# Patient Record
Sex: FEMALE | Race: WHITE | NOT HISPANIC OR LATINO | Employment: OTHER | URBAN - METROPOLITAN AREA
[De-identification: names, ages, dates, MRNs, and addresses within clinical notes are randomized per-mention and may not be internally consistent; named-entity substitution may affect disease eponyms.]

---

## 2017-02-17 RX ORDER — RIVAROXABAN 20 MG/1
TABLET, FILM COATED ORAL
Qty: 30 TABLET | Refills: 5 | Status: SHIPPED | OUTPATIENT
Start: 2017-02-17 | End: 2017-08-28 | Stop reason: SDUPTHER

## 2017-07-12 ENCOUNTER — TELEPHONE (OUTPATIENT)
Dept: CARDIOLOGY | Facility: CLINIC | Age: 57
End: 2017-07-12

## 2017-07-12 NOTE — TELEPHONE ENCOUNTER
"Pt has scheduled f/u appt end of July with Dr. Davidson. She calls to report for the last 2-3 weeks her SBP has been elevated 150-160. HR 80's. Denies symptoms of afib, chest pain, shortness of air or edema. She is currently taking amlodipine 10 mg daily, sotalol 80 mg BID and Xarelto. She reports increase in stress. She states she has been on lisinopril in the past, (unknown dose) and thinks she just \"ran out\". She uses [facility] pharmacy. Please advise. Sara MATTA  "

## 2017-07-28 RX ORDER — SOTALOL HYDROCHLORIDE 80 MG/1
TABLET ORAL
Qty: 60 TABLET | Refills: 3 | Status: SHIPPED | OUTPATIENT
Start: 2017-07-28 | End: 2017-11-28 | Stop reason: SDUPTHER

## 2017-07-31 ENCOUNTER — OFFICE VISIT (OUTPATIENT)
Dept: CARDIOLOGY | Facility: CLINIC | Age: 57
End: 2017-07-31

## 2017-07-31 VITALS
BODY MASS INDEX: 21.67 KG/M2 | WEIGHT: 151.4 LBS | DIASTOLIC BLOOD PRESSURE: 68 MMHG | HEART RATE: 76 BPM | HEIGHT: 70 IN | SYSTOLIC BLOOD PRESSURE: 124 MMHG

## 2017-07-31 DIAGNOSIS — I48.0 PAROXYSMAL ATRIAL FIBRILLATION (HCC): Primary | ICD-10-CM

## 2017-07-31 DIAGNOSIS — I10 ESSENTIAL HYPERTENSION: ICD-10-CM

## 2017-07-31 DIAGNOSIS — I34.0 NON-RHEUMATIC MITRAL REGURGITATION: ICD-10-CM

## 2017-07-31 PROCEDURE — 93000 ELECTROCARDIOGRAM COMPLETE: CPT | Performed by: INTERNAL MEDICINE

## 2017-07-31 PROCEDURE — 99214 OFFICE O/P EST MOD 30 MIN: CPT | Performed by: INTERNAL MEDICINE

## 2017-07-31 NOTE — PROGRESS NOTES
[facility] AT [address], [address]  [place], [address] [phone]  [email or web address]           OUTPATIENT CLINIC FOLLOW UP NOTE    Patient Care Team:  Patient Care Team:  No Known Provider as PCP - General Aston Packer DO as Consulting Physician (Cardiology)    Subjective:   Reason for consultation:   Chief Complaint   Patient presents with   • Hypertension       HPI:    Cristela Stratton is a 57 y.o. female.  History of Present Illness  The patient has a history of mitral valve prolapse, mild mitral regurgitation, paroxysmal atrial fibrillation on sotalol for rhythm control, hypertension.  She presents for follow-up.    The patient denies palpitations to suggest recurrence of her atrial fibrillation while on sotalol.  She denies chest pain, dyspnea with exertion, orthopnea, PND to suggest symptoms from her mitral regurgitation.  She has noted elevated blood pressures in the 150s lately.  She is under significant stress.  She is assuming care of her 3 grandchildren.  Her blood pressure is normal here today.  She denies unusual headaches or vision changes to suggest a hypertensive urgency.    She does have numbness and tingling of her left lower extremity with mild associated swelling.  She also has lower back pain and case no shooting pains down both legs    PFSH:  Patient Active Problem List   Diagnosis   • Tobacco abuse   • Hypertension   • Bronchitis   • Atrial fibrillation   • Non-rheumatic mitral regurgitation         Current Outpatient Prescriptions:   •  acetaminophen (TYLENOL) 500 MG tablet, Take 500 mg by mouth Every 6 (Six) Hours As Needed for mild pain (1-3)., Disp: , Rfl:   •  amLODIPine (NORVASC) 10 MG tablet, Take 1 tablet by mouth Daily., Disp: 30 tablet, Rfl: 11  •  sotalol (BETAPACE) 80 MG tablet, TAKE ONE TABLET BY MOUTH TWICE DAILY, Disp: 60 tablet, Rfl: 3  •  XARELTO 20 MG tablet, TAKE ONE TABLET BY MOUTH ONCE DAILY WITH  DINNER, Disp:  "30 tablet, Rfl: 5    No Known Allergies     reports that she has been smoking Cigarettes.  She has a 22.00 pack-year smoking history. She does not have any smokeless tobacco history on file.    Family History   Problem Relation Age of Onset   • Cancer Mother    • Cancer Maternal Aunt    • Alcohol abuse Maternal Grandfather    • Heart disease Maternal Grandfather        Review of Systems:  Positive for left lower extremity numbness and tingling, lower back pain  Negative for exertional chest pain, dyspnea with exertion, orthopnea, PND, palpitations, lightheadedness, syncope.   Review of Systems  All other systems reviewed and are negative.    Objective:   Blood pressure 124/68, pulse 76, height 70\" (177.8 cm), weight 151 lb 6.4 oz (68.7 kg).  Physical Exam  CONSTITUTIONAL: No acute distress, normal affect  RESPIRATORY: Normal effort. Clear to auscultation bilaterally without wheezing or rales  CARDIOVASCULAR: Jugular venous pressure within normal limits. Carotids with normal upstrokes without bruits.  Regular rate and rhythm with normal S1 and S2. Without murmur, gallop or rub.  PERIPHERAL VASCULAR: Normal radial pulses bilaterally. There is mild left lower extremity edema bilaterally. Normal DP and PT pulses on left    Labs:  No results found for: ALT, AST  Lab Results   Component Value Date    CREATININE 0.8 10/21/2016       Diagnostic Data:      ECG 12 Lead  Date/Time: 7/31/2017 1:59 PM  Performed by: CAROLINE LOPEZ  Authorized by: CAROLINE LOPEZ   Rhythm: sinus rhythm  Comments: Borderline LVH, QRS 94 ms,  ms          TTE 9/2016  · All left ventricular wall segments contract normally.  · Left ventricular function is normal.  · Mild tricuspid valve regurgitation is present.  · Mild pulmonic valve regurgitation is present.  · Mild mitral valve regurgitation is present    Assessment and Plan:   Cristela was seen today for hypertension.    Diagnoses and all orders for this visit:    Paroxysmal atrial " fibrillation  -In normal sinus rhythm today,  ms, continue sotalol and Xarelto  -Follow up with Dr. Packer in 6 months    Non-rheumatic mitral regurgitation  -Asymptomatic, no repeat echo at this time    Essential hypertension  -Blood pressure control today.  Continue current medications.  Recent elevation of blood pressure likely related to ongoing stress    Left lower extremity numbness and tingling  -Normal distal pulses, mild pretibial swelling, recent ankle sprain as well as toe injury, negative recent x-ray; recommend clinical monitoring for now; possible symptoms of sciatica; recommend follow-up with primary care doctor    - Dietary and exercise counseling was provided during this visit  - Return in about 1 year (around 7/31/2018).    Adarsh Davidson MD, MSc, [facility]  Interventional Cardiology  [facility] at [facility]

## 2017-08-29 RX ORDER — RIVAROXABAN 20 MG/1
TABLET, FILM COATED ORAL
Qty: 30 TABLET | Refills: 5 | Status: SHIPPED | OUTPATIENT
Start: 2017-08-29 | End: 2018-02-27 | Stop reason: SDUPTHER

## 2017-11-28 DIAGNOSIS — I10 ESSENTIAL HYPERTENSION: ICD-10-CM

## 2017-11-28 RX ORDER — AMLODIPINE BESYLATE 10 MG/1
TABLET ORAL
Qty: 90 TABLET | Refills: 3 | Status: SHIPPED | OUTPATIENT
Start: 2017-11-28 | End: 2018-11-09 | Stop reason: SDUPTHER

## 2017-11-28 RX ORDER — SOTALOL HYDROCHLORIDE 80 MG/1
TABLET ORAL
Qty: 60 TABLET | Refills: 6 | Status: SHIPPED | OUTPATIENT
Start: 2017-11-28 | End: 2018-07-08 | Stop reason: SDUPTHER

## 2018-02-27 RX ORDER — RIVAROXABAN 20 MG/1
TABLET, FILM COATED ORAL
Qty: 30 TABLET | Refills: 5 | Status: SHIPPED | OUTPATIENT
Start: 2018-02-27 | End: 2018-09-19 | Stop reason: SDUPTHER

## 2018-03-01 ENCOUNTER — OFFICE VISIT (OUTPATIENT)
Dept: CARDIOLOGY | Facility: CLINIC | Age: 58
End: 2018-03-01

## 2018-03-01 VITALS
HEIGHT: 70 IN | SYSTOLIC BLOOD PRESSURE: 140 MMHG | BODY MASS INDEX: 21.56 KG/M2 | WEIGHT: 150.6 LBS | DIASTOLIC BLOOD PRESSURE: 80 MMHG | HEART RATE: 77 BPM

## 2018-03-01 DIAGNOSIS — I48.0 PAROXYSMAL ATRIAL FIBRILLATION (HCC): ICD-10-CM

## 2018-03-01 DIAGNOSIS — I10 ESSENTIAL HYPERTENSION: Primary | ICD-10-CM

## 2018-03-01 DIAGNOSIS — Z72.0 TOBACCO ABUSE: ICD-10-CM

## 2018-03-01 PROCEDURE — 93000 ELECTROCARDIOGRAM COMPLETE: CPT | Performed by: PHYSICIAN ASSISTANT

## 2018-03-01 PROCEDURE — 99214 OFFICE O/P EST MOD 30 MIN: CPT | Performed by: PHYSICIAN ASSISTANT

## 2018-03-01 PROCEDURE — 99406 BEHAV CHNG SMOKING 3-10 MIN: CPT | Performed by: PHYSICIAN ASSISTANT

## 2018-03-01 NOTE — PROGRESS NOTES
[facility] at [facility] - Office Note  Cristela Stratton         [address] [place] [address]  1960   [phone] (home) [phone] (work)     LOCATION:  [facility].  Visit Type: Follow Up.    PCP:  Aston Packer,     03/01/18   Cristela Stratton is a 57 y.o.  female  unemployed.      Chief Complaint:   FU on AFib, HTN.    Problem list/past medical history:  1.  Paroxysmal atrial fibrillation   A.  CHADS2 Vasc = 2.   B.  Currently on Sotalol and Xarelto  2.  Essential hypertension  3.  Ongoing Tobacco Abuse  4.  Valvular heart disease   A.  non-rheumatic   B.  TTE 9/2016  · All left ventricular wall segments contract normally.  · Left ventricular function is normal.  · Mild tricuspid valve regurgitation is present.  · Mild pulmonic valve regurgitation is present.  · Mild mitral valve regurgitation is present       No Known Allergies      Current Outpatient Prescriptions:   •  acetaminophen (TYLENOL) 500 MG tablet, Take 500 mg by mouth Every 6 (Six) Hours As Needed for mild pain (1-3)., Disp: , Rfl:   •  amLODIPine (NORVASC) 10 MG tablet, TAKE ONE TABLET BY MOUTH ONCE DAILY, Disp: 90 tablet, Rfl: 3  •  sotalol (BETAPACE) 80 MG tablet, TAKE ONE TABLET BY MOUTH TWICE DAILY, Disp: 60 tablet, Rfl: 6  •  XARELTO 20 MG tablet, TAKE ONE TABLET BY MOUTH ONCE DAILY WITH  DINNER, Disp: 30 tablet, Rfl: 5  •  albuterol (PROVENTIL HFA;VENTOLIN HFA) 108 (90 Base) MCG/ACT inhaler, Inhale 2 puffs Every 4 (Four) Hours As Needed for Wheezing., Disp: 3.7 g, Rfl: 0    HPI  Cristela is here today for a follow up on paroxysmal atrial fibrillation, controlled HTN, and  VHDz.  She is doing well from a cardiac standpoint.  She generally follows with Dr. Davidson for her valvular heart disease.  She denies any chest pain or exertional angina, denies exertional dyspnea or weakness.  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  She also works third shift so her daily schedules are off.  She states when she is chasing the kids around a lot she gets stressed and gets a pain in the back of her neck.  This only occurs when she is around the kids and does not occur with any other activity or exertion.  She is doing well on her current medications, is compliant, and has no issues of bleeding.  She does continue to smoke.          The following portions of the patient's history were reviewed in the chart and updated as appropriate: allergies, current medications, past family history, past medical history, past social history, past surgical history and problem list.    Review of Systems   Constitution: Positive for malaise/fatigue. Negative for weakness.   Cardiovascular: Negative for chest pain, dyspnea on exertion and leg swelling.   Hematologic/Lymphatic: Negative for bleeding problem. Does not bruise/bleed easily.   Musculoskeletal: Positive for muscle cramps, neck pain and stiffness.   Neurological: Negative for dizziness and light-headedness.   All other systems reviewed and are negative.            height is 177.8 cm (70\") and weight is 68.3 kg (150 lb 9.6 oz). Her blood pressure is 140/80 and her pulse is 77.   Physical Exam   Constitutional: Vital signs are normal. She appears well-developed and well-nourished.   Cardiovascular: Normal rate, regular rhythm, S1 normal, S2 normal, normal heart sounds, intact distal pulses and normal pulses.    Pulmonary/Chest: Effort normal and breath sounds normal. She has no wheezes. She has no rhonchi. She has no rales.   Abdominal: Soft. Normal appearance and bowel sounds are normal. There is no hepatosplenomegaly.   Neurological: She is alert.           ECG 12 Lead  Date/Time: 3/1/2018 12:06 PM  Performed by: RUPINDER ZAVALA  Authorized by: RUPINDER ZAVALA   Comparison: compared with previous ECG from 7/31/2017  Similar to previous ECG  Rhythm: sinus rhythm  Rate: normal  QRS " axis: normal  Clinical impression: non-specific ECG             Assessment/ Plan     Essential hypertension:  Controlled.  Continue to monitor.    Tobacco abuse:  Cessation education provided at least 5 minutes in duration.  Cessation aides such as nicotine patches, nicotine gum and even affordable   Chantix at this time.  She is not interested currently.    Paroxysmal atrial fibrillation: EKG performed and reviewed.  Maintaining normal sinus rhythm.  We'll continue medications which include sotalol and Xarelto.    Keep appt with Dr. Davidson in August 2018.    Adriana Georges PA-C  3/1/2018 12:07 PM      EMR Dragon/Transcription disclaimer:   Much of this encounter note is an electronic transcription/translation of spoken language to printed text. The electronic translation of spoken language may permit erroneous, or at times, nonsensical words or phrases to be inadvertently transcribed; Although I have reviewed the note for such errors, some may still exist.

## 2018-07-09 RX ORDER — SOTALOL HYDROCHLORIDE 80 MG/1
TABLET ORAL
Qty: 60 TABLET | Refills: 6 | Status: SHIPPED | OUTPATIENT
Start: 2018-07-09 | End: 2019-02-10 | Stop reason: SDUPTHER

## 2018-08-02 NOTE — PROGRESS NOTES
[facility] AT [address], [address]  [place], [address] [phone]  [email or web address]           OUTPATIENT CLINIC FOLLOW UP NOTE    Patient Care Team:  Patient Care Team:  Provider, No Known as PCP - General    Subjective:   Reason for consultation:   Chief Complaint   Patient presents with   • Atrial Fibrillation   • Hypertension       HPI:    Cristela Stratton is a 58 y.o. female.  History of Present Illness  The patient has a history of mitral valve prolapse, mild mitral regurgitation, paroxysmal atrial fibrillation on sotalol for rhythm control, hypertension.  She presents for follow-up.    In the last couple months the patient has had palpitations while sleeping that awake her.  The last seconds to minutes.  No associated chest pain or shortness of breath.  They're moderately severe.  During her waking hours she does not feel the symptoms.  She works night shift.  She is taking care of 3 young children at home for her grandkids as well as an ill .    PFSH:  Patient Active Problem List   Diagnosis   • Tobacco abuse   • Essential hypertension   • Bronchitis   • Atrial fibrillation (CMS/HCC)   • Non-rheumatic mitral regurgitation         Current Outpatient Prescriptions:   •  acetaminophen (TYLENOL) 500 MG tablet, Take 500 mg by mouth Every 6 (Six) Hours As Needed for mild pain (1-3)., Disp: , Rfl:   •  amLODIPine (NORVASC) 10 MG tablet, TAKE ONE TABLET BY MOUTH ONCE DAILY, Disp: 90 tablet, Rfl: 3  •  sotalol (BETAPACE) 80 MG tablet, TAKE ONE TABLET BY MOUTH TWICE DAILY, Disp: 60 tablet, Rfl: 6  •  XARELTO 20 MG tablet, TAKE ONE TABLET BY MOUTH ONCE DAILY WITH  DINNER, Disp: 30 tablet, Rfl: 5    No Known Allergies     reports that she has been smoking Cigarettes.  She has a 22.00 pack-year smoking history. She has never used smokeless tobacco.    Family History   Problem Relation Age of Onset   • Cancer Mother    • Cancer Maternal Aunt    • Alcohol  "abuse Maternal Grandfather    • Heart disease Maternal Grandfather        Review of Systems:  Positive for left lower extremity numbness and tingling, lower back pain  Negative for exertional chest pain, dyspnea with exertion, orthopnea, PND, palpitations, lightheadedness, syncope.   Review of Systems  All other systems reviewed and are negative.    Objective:   Blood pressure 130/80, pulse 73, height 177.8 cm (70\"), weight 68.5 kg (151 lb), SpO2 98 %.  Physical Exam  CONSTITUTIONAL: No acute distress, normal affect  RESPIRATORY: Normal effort. Clear to auscultation bilaterally without wheezing or rales  CARDIOVASCULAR: Carotids with normal upstrokes without bruits.  Regular rate and rhythm with normal S1 and S2. Without murmur, gallop or rub.  PERIPHERAL VASCULAR: Normal radial pulses bilaterally.  No significant swelling Normal DP and PT pulses on left    Labs:  No results found for: ALT, AST  Lab Results   Component Value Date    CREATININE 0.8 10/21/2016       Diagnostic Data:    Procedures  TTE 9/2016  · All left ventricular wall segments contract normally.  · Left ventricular function is normal.  · Mild tricuspid valve regurgitation is present.  · Mild pulmonic valve regurgitation is present.  · Mild mitral valve regurgitation is present    Assessment and Plan:   Cristela was seen today for hypertension.    Diagnoses and all orders for this visit:    Palpitations  -Likely stress-induced given her regular work schedule, and caring for multiple other dependence and plan  -ZIO Patch to assess for recurrent atrial fibrillation or other arrhythmias    Paroxysmal atrial fibrillation  -In normal sinus rhythm today, continue sotalol and Xarelto  -Follow up with Dr. Packer in 6 months    Non-rheumatic mitral regurgitation  -Asymptomatic, no repeat echo at this time    Essential hypertension  -Blood pressure controlled     Tobacco dependence  -Cessation advised    - Return in about 1 year (around 8/6/2019).        "

## 2018-08-06 ENCOUNTER — OFFICE VISIT (OUTPATIENT)
Dept: CARDIOLOGY | Facility: CLINIC | Age: 58
End: 2018-08-06

## 2018-08-06 VITALS
HEIGHT: 70 IN | WEIGHT: 151 LBS | OXYGEN SATURATION: 98 % | BODY MASS INDEX: 21.62 KG/M2 | DIASTOLIC BLOOD PRESSURE: 80 MMHG | SYSTOLIC BLOOD PRESSURE: 130 MMHG | HEART RATE: 73 BPM

## 2018-08-06 DIAGNOSIS — I34.0 NON-RHEUMATIC MITRAL REGURGITATION: Primary | ICD-10-CM

## 2018-08-06 DIAGNOSIS — Z72.0 TOBACCO ABUSE: ICD-10-CM

## 2018-08-06 DIAGNOSIS — I48.0 PAROXYSMAL ATRIAL FIBRILLATION (HCC): ICD-10-CM

## 2018-08-06 DIAGNOSIS — R00.2 PALPITATIONS: ICD-10-CM

## 2018-08-06 PROCEDURE — 99214 OFFICE O/P EST MOD 30 MIN: CPT | Performed by: INTERNAL MEDICINE

## 2018-08-07 ENCOUNTER — TELEPHONE (OUTPATIENT)
Dept: CARDIOLOGY | Facility: CLINIC | Age: 58
End: 2018-08-07

## 2018-08-07 NOTE — TELEPHONE ENCOUNTER
The patient called stating that she has a question regarding a medication.  Upon return call her VM is full, and she did not answer.  I will continue to attempt to call her back.

## 2018-09-02 ENCOUNTER — APPOINTMENT (OUTPATIENT)
Dept: GENERAL RADIOLOGY | Facility: HOSPITAL | Age: 58
End: 2018-09-02

## 2018-09-02 ENCOUNTER — HOSPITAL ENCOUNTER (EMERGENCY)
Facility: HOSPITAL | Age: 58
Discharge: HOME | End: 2018-09-02
Attending: EMERGENCY MEDICINE | Admitting: EMERGENCY MEDICINE

## 2018-09-02 VITALS
TEMPERATURE: 98 F | DIASTOLIC BLOOD PRESSURE: 87 MMHG | BODY MASS INDEX: 21.62 KG/M2 | OXYGEN SATURATION: 97 % | WEIGHT: 151 LBS | RESPIRATION RATE: 18 BRPM | SYSTOLIC BLOOD PRESSURE: 132 MMHG | HEART RATE: 63 BPM | HEIGHT: 70 IN

## 2018-09-02 DIAGNOSIS — J45.21 MILD INTERMITTENT REACTIVE AIRWAY DISEASE WITH ACUTE EXACERBATION: Primary | ICD-10-CM

## 2018-09-02 LAB
ALBUMIN SERPL-MCNC: 4.3 G/DL (ref 3.5–5)
ALBUMIN/GLOB SERPL: 1.4 G/DL (ref 1–2)
ALP SERPL-CCNC: 78 U/L (ref 38–126)
ALT SERPL W P-5'-P-CCNC: 16 U/L (ref 13–69)
ANION GAP SERPL CALCULATED.3IONS-SCNC: 13.6 MMOL/L (ref 10–20)
AST SERPL-CCNC: 20 U/L (ref 15–46)
BASOPHILS # BLD AUTO: 0.03 10*3/MM3 (ref 0–0.2)
BASOPHILS NFR BLD AUTO: 0.8 % (ref 0–2.5)
BILIRUB SERPL-MCNC: 0.6 MG/DL (ref 0.2–1.3)
BUN BLD-MCNC: 7 MG/DL (ref 7–20)
BUN/CREAT SERPL: 10 (ref 7.1–23.5)
CALCIUM SPEC-SCNC: 9.1 MG/DL (ref 8.4–10.2)
CHLORIDE SERPL-SCNC: 104 MMOL/L (ref 98–107)
CO2 SERPL-SCNC: 25 MMOL/L (ref 26–30)
CREAT BLD-MCNC: 0.7 MG/DL (ref 0.6–1.3)
DEPRECATED RDW RBC AUTO: 43.1 FL (ref 37–54)
EOSINOPHIL # BLD AUTO: 0.17 10*3/MM3 (ref 0–0.7)
EOSINOPHIL NFR BLD AUTO: 4.6 % (ref 0–7)
ERYTHROCYTE [DISTWIDTH] IN BLOOD BY AUTOMATED COUNT: 12.8 % (ref 11.5–14.5)
GFR SERPL CREATININE-BSD FRML MDRD: 85 ML/MIN/1.73
GLOBULIN UR ELPH-MCNC: 3.1 GM/DL
GLUCOSE BLD-MCNC: 95 MG/DL (ref 74–98)
HCT VFR BLD AUTO: 39.6 % (ref 37–47)
HGB BLD-MCNC: 13.3 G/DL (ref 12–16)
HOLD SPECIMEN: NORMAL
HOLD SPECIMEN: NORMAL
IMM GRANULOCYTES # BLD: 0.01 10*3/MM3 (ref 0–0.06)
IMM GRANULOCYTES NFR BLD: 0.3 % (ref 0–0.6)
LYMPHOCYTES # BLD AUTO: 1.37 10*3/MM3 (ref 0.6–3.4)
LYMPHOCYTES NFR BLD AUTO: 37.2 % (ref 10–50)
MCH RBC QN AUTO: 30.6 PG (ref 27–31)
MCHC RBC AUTO-ENTMCNC: 33.6 G/DL (ref 30–37)
MCV RBC AUTO: 91.2 FL (ref 81–99)
MONOCYTES # BLD AUTO: 0.47 10*3/MM3 (ref 0–0.9)
MONOCYTES NFR BLD AUTO: 12.8 % (ref 0–12)
NEUTROPHILS # BLD AUTO: 1.63 10*3/MM3 (ref 2–6.9)
NEUTROPHILS NFR BLD AUTO: 44.3 % (ref 37–80)
NRBC BLD MANUAL-RTO: 0 /100 WBC (ref 0–0)
NT-PROBNP SERPL-MCNC: 128 PG/ML (ref 0–125)
PLATELET # BLD AUTO: 183 10*3/MM3 (ref 130–400)
PMV BLD AUTO: 9.4 FL (ref 6–12)
POTASSIUM BLD-SCNC: 3.6 MMOL/L (ref 3.5–5.1)
PROT SERPL-MCNC: 7.4 G/DL (ref 6.3–8.2)
RBC # BLD AUTO: 4.34 10*6/MM3 (ref 4.2–5.4)
SODIUM BLD-SCNC: 139 MMOL/L (ref 137–145)
TROPONIN I SERPL-MCNC: <0.012 NG/ML (ref 0–0.03)
WBC NRBC COR # BLD: 3.68 10*3/MM3 (ref 4.8–10.8)
WHOLE BLOOD HOLD SPECIMEN: NORMAL
WHOLE BLOOD HOLD SPECIMEN: NORMAL

## 2018-09-02 PROCEDURE — 84484 ASSAY OF TROPONIN QUANT: CPT | Performed by: EMERGENCY MEDICINE

## 2018-09-02 PROCEDURE — 94640 AIRWAY INHALATION TREATMENT: CPT

## 2018-09-02 PROCEDURE — 99283 EMERGENCY DEPT VISIT LOW MDM: CPT

## 2018-09-02 PROCEDURE — 63710000001 DEXAMETHASONE PER 0.25 MG: Performed by: EMERGENCY MEDICINE

## 2018-09-02 PROCEDURE — 71046 X-RAY EXAM CHEST 2 VIEWS: CPT

## 2018-09-02 PROCEDURE — 94799 UNLISTED PULMONARY SVC/PX: CPT

## 2018-09-02 PROCEDURE — 85025 COMPLETE CBC W/AUTO DIFF WBC: CPT | Performed by: EMERGENCY MEDICINE

## 2018-09-02 PROCEDURE — 93005 ELECTROCARDIOGRAM TRACING: CPT | Performed by: EMERGENCY MEDICINE

## 2018-09-02 PROCEDURE — 80053 COMPREHEN METABOLIC PANEL: CPT | Performed by: EMERGENCY MEDICINE

## 2018-09-02 PROCEDURE — 83880 ASSAY OF NATRIURETIC PEPTIDE: CPT | Performed by: EMERGENCY MEDICINE

## 2018-09-02 RX ORDER — IPRATROPIUM BROMIDE AND ALBUTEROL SULFATE 2.5; .5 MG/3ML; MG/3ML
3 SOLUTION RESPIRATORY (INHALATION) ONCE
Status: COMPLETED | OUTPATIENT
Start: 2018-09-02 | End: 2018-09-02

## 2018-09-02 RX ORDER — ALBUTEROL SULFATE 90 UG/1
2 AEROSOL, METERED RESPIRATORY (INHALATION) ONCE
Status: COMPLETED | OUTPATIENT
Start: 2018-09-02 | End: 2018-09-02

## 2018-09-02 RX ORDER — SODIUM CHLORIDE 0.9 % (FLUSH) 0.9 %
10 SYRINGE (ML) INJECTION AS NEEDED
Status: DISCONTINUED | OUTPATIENT
Start: 2018-09-02 | End: 2018-09-02 | Stop reason: HOSPADM

## 2018-09-02 RX ADMIN — IPRATROPIUM BROMIDE AND ALBUTEROL SULFATE 3 ML: .5; 3 SOLUTION RESPIRATORY (INHALATION) at 03:06

## 2018-09-02 RX ADMIN — DEXAMETHASONE 6 MG: 2 TABLET ORAL at 03:43

## 2018-09-02 RX ADMIN — ALBUTEROL SULFATE 2 PUFF: 90 AEROSOL, METERED RESPIRATORY (INHALATION) at 03:43

## 2018-09-02 NOTE — ED PROVIDER NOTES
Subjective   History of Present Illness   58F w/ hx of afib, HTN, 1ppd tobacco abuse p/w 2d of dry cough and sensation that she cannot get a deep breath.  She does have some orthopnea but this is not new.  Denies fevers, chills, leg swelling, chest pain, or other specific symptoms.    Review of Systems   Respiratory: Positive for cough and shortness of breath.    All other systems reviewed and are negative.      Past Medical History:   Diagnosis Date   • Atrial fibrillation (CMS/HCC)     CHADS-VASC = 2   • Hypertension        No Known Allergies    History reviewed. No pertinent surgical history.    Family History   Problem Relation Age of Onset   • Cancer Mother    • Cancer Maternal Aunt    • Alcohol abuse Maternal Grandfather    • Heart disease Maternal Grandfather        Social History     Social History   • Marital status:      Social History Main Topics   • Smoking status: Current Every Day Smoker     Packs/day: 1.00     Years: 22.00     Types: Cigarettes   • Smokeless tobacco: Never Used   • Alcohol use No   • Drug use: No   • Sexual activity: No     Other Topics Concern   • Not on file           Objective   Physical Exam   Constitutional: She is oriented to person, place, and time. She appears well-developed and well-nourished. No distress.   HENT:   Head: Normocephalic.   Mouth/Throat: Oropharynx is clear and moist.   Eyes: No scleral icterus.   Neck: Neck supple. No tracheal deviation present.   Cardiovascular: Normal rate, regular rhythm, normal heart sounds and intact distal pulses.  Exam reveals no gallop and no friction rub.    No murmur heard.  Pulmonary/Chest: Effort normal. No stridor. No respiratory distress. She has wheezes (expiratory wheezing bilaterally). She has rales (Faint, fine crackles R base). She exhibits no tenderness.   Abdominal: Soft. She exhibits no distension and no mass. There is no tenderness. There is no rebound and no guarding.   Musculoskeletal: She exhibits no edema or  deformity.   Neurological: She is alert and oriented to person, place, and time.   Skin: Skin is warm and dry. She is not diaphoretic. No erythema. No pallor.   Psychiatric: She has a normal mood and affect. Her behavior is normal.   Nursing note and vitals reviewed.      Procedures           ED Course  ED Course as of Sep 02 0339   Sun Sep 02, 2018   0311 NSR w/ nl intervals, no alana/std. Overall, normal ekg  [AI]      ED Course User Index  [AI] Hakan Munroe MD                  [facility]  58-year-old female here with cough and shortness of breath.  Afebrile, vital signs stable.  She does have faint end expiratory wheezing bilaterally as well as some faint crackles in the right base.  Suspect likely reactive airway disease in the setting of her tobacco abuse and possible URI.  Less likely pneumonia, heart failure, other.  No history suggestive of PE and she is artery anticoagulated making this very unlikely.  We'll send labs, chest x-ray, give a DuoNeb and reassess.    3:39 AM lab work, chest x-ray, and EKG are all reassuring.  Patient states she feels much better after the DuoNeb.  We'll give a dose of Decadron, inhaler to take home, strict return to care precautions.    Final diagnoses:   Mild intermittent reactive airway disease with acute exacerbation            Hakan Munroe MD  09/02/18 [id number]

## 2018-09-07 ENCOUNTER — HOSPITAL ENCOUNTER (INPATIENT)
Facility: HOSPITAL | Age: 58
LOS: 2 days | Discharge: HOME | End: 2018-09-09
Attending: EMERGENCY MEDICINE | Admitting: HOSPITALIST

## 2018-09-07 ENCOUNTER — APPOINTMENT (OUTPATIENT)
Dept: GENERAL RADIOLOGY | Facility: HOSPITAL | Age: 58
End: 2018-09-07

## 2018-09-07 DIAGNOSIS — J18.9 PNEUMONIA OF LEFT LOWER LOBE DUE TO INFECTIOUS ORGANISM: Primary | ICD-10-CM

## 2018-09-07 DIAGNOSIS — I48.91 ATRIAL FIBRILLATION WITH RAPID VENTRICULAR RESPONSE (HCC): ICD-10-CM

## 2018-09-07 LAB
ALBUMIN SERPL-MCNC: 4.2 G/DL (ref 3.5–5)
ALBUMIN/GLOB SERPL: 1.3 G/DL (ref 1–2)
ALP SERPL-CCNC: 106 U/L (ref 38–126)
ALT SERPL W P-5'-P-CCNC: 14 U/L (ref 13–69)
ANION GAP SERPL CALCULATED.3IONS-SCNC: 13.4 MMOL/L (ref 10–20)
ARTERIAL PATENCY WRIST A: ABNORMAL
AST SERPL-CCNC: 20 U/L (ref 15–46)
ATMOSPHERIC PRESS: 738 MMHG
BASE EXCESS BLDA CALC-SCNC: 2.8 MMOL/L (ref 0–2)
BASOPHILS # BLD AUTO: 0.03 10*3/MM3 (ref 0–0.2)
BASOPHILS NFR BLD AUTO: 0.3 % (ref 0–2.5)
BDY SITE: ABNORMAL
BILIRUB SERPL-MCNC: 0.7 MG/DL (ref 0.2–1.3)
BUN BLD-MCNC: 6 MG/DL (ref 7–20)
BUN/CREAT SERPL: 8.6 (ref 7.1–23.5)
CALCIUM SPEC-SCNC: 9.2 MG/DL (ref 8.4–10.2)
CHLORIDE SERPL-SCNC: 107 MMOL/L (ref 98–107)
CO2 SERPL-SCNC: 24 MMOL/L (ref 26–30)
COHGB MFR BLD: 0.7 % (ref 0–2)
CREAT BLD-MCNC: 0.7 MG/DL (ref 0.6–1.3)
D-DIMER, QUANTITATIVE (MAD,POW, STR): 358 NG/ML (FEU) (ref 0–500)
DEPRECATED RDW RBC AUTO: 42.5 FL (ref 37–54)
EOSINOPHIL # BLD AUTO: 0.13 10*3/MM3 (ref 0–0.7)
EOSINOPHIL NFR BLD AUTO: 1.3 % (ref 0–7)
ERYTHROCYTE [DISTWIDTH] IN BLOOD BY AUTOMATED COUNT: 12.9 % (ref 11.5–14.5)
GFR SERPL CREATININE-BSD FRML MDRD: 85 ML/MIN/1.73
GLOBULIN UR ELPH-MCNC: 3.3 GM/DL
GLUCOSE BLD-MCNC: 112 MG/DL (ref 74–98)
HCO3 BLDA-SCNC: 24.8 MMOL/L (ref 22–28)
HCT VFR BLD AUTO: 43.9 % (ref 37–47)
HCT VFR BLD CALC: 43 %
HGB BLD-MCNC: 14.7 G/DL (ref 12–16)
HGB BLDA-MCNC: 14 G/DL (ref 12–18)
HOLD SPECIMEN: NORMAL
HOLD SPECIMEN: NORMAL
HOROWITZ INDEX BLD+IHG-RTO: 21 %
IMM GRANULOCYTES # BLD: 0.02 10*3/MM3 (ref 0–0.06)
IMM GRANULOCYTES NFR BLD: 0.2 % (ref 0–0.6)
LYMPHOCYTES # BLD AUTO: 2.29 10*3/MM3 (ref 0.6–3.4)
LYMPHOCYTES NFR BLD AUTO: 23.7 % (ref 10–50)
Lab: ABNORMAL
MCH RBC QN AUTO: 29.9 PG (ref 27–31)
MCHC RBC AUTO-ENTMCNC: 33.5 G/DL (ref 30–37)
MCV RBC AUTO: 89.2 FL (ref 81–99)
METHGB BLD QL: 0.7 % (ref 0–1.5)
MODALITY: ABNORMAL
MONOCYTES # BLD AUTO: 0.93 10*3/MM3 (ref 0–0.9)
MONOCYTES NFR BLD AUTO: 9.6 % (ref 0–12)
NEUTROPHILS # BLD AUTO: 6.27 10*3/MM3 (ref 2–6.9)
NEUTROPHILS NFR BLD AUTO: 64.9 % (ref 37–80)
NRBC BLD MANUAL-RTO: 0 /100 WBC (ref 0–0)
NT-PROBNP SERPL-MCNC: 2110 PG/ML (ref 0–125)
OXYHGB MFR BLDV: 94.8 % (ref 94–99)
PCO2 BLDA: 30.1 MM HG (ref 35–45)
PCO2 TEMP ADJ BLD: ABNORMAL MM HG (ref 35–45)
PH BLDA: 7.53 PH UNITS (ref 7.3–7.5)
PH, TEMP CORRECTED: ABNORMAL PH UNITS
PLATELET # BLD AUTO: 278 10*3/MM3 (ref 130–400)
PMV BLD AUTO: 9.5 FL (ref 6–12)
PO2 BLDA: 71.1 MM HG (ref 75–100)
PO2 TEMP ADJ BLD: ABNORMAL MM HG (ref 83–108)
POTASSIUM BLD-SCNC: 3.4 MMOL/L (ref 3.5–5.1)
PROCALCITONIN SERPL-MCNC: <0.05 NG/ML
PROT SERPL-MCNC: 7.5 G/DL (ref 6.3–8.2)
RBC # BLD AUTO: 4.92 10*6/MM3 (ref 4.2–5.4)
SAO2 % BLDCOA: 96.1 % (ref 94–100)
SODIUM BLD-SCNC: 141 MMOL/L (ref 137–145)
TROPONIN I SERPL-MCNC: <0.012 NG/ML (ref 0–0.03)
VENTILATOR MODE: ABNORMAL
WBC NRBC COR # BLD: 9.67 10*3/MM3 (ref 4.8–10.8)
WHOLE BLOOD HOLD SPECIMEN: NORMAL
WHOLE BLOOD HOLD SPECIMEN: NORMAL

## 2018-09-07 PROCEDURE — 25010000002 LEVOFLOXACIN PER 250 MG: Performed by: EMERGENCY MEDICINE

## 2018-09-07 PROCEDURE — 99222 1ST HOSP IP/OBS MODERATE 55: CPT | Performed by: INTERNAL MEDICINE

## 2018-09-07 PROCEDURE — 84145 PROCALCITONIN (PCT): CPT | Performed by: INTERNAL MEDICINE

## 2018-09-07 PROCEDURE — 83880 ASSAY OF NATRIURETIC PEPTIDE: CPT | Performed by: EMERGENCY MEDICINE

## 2018-09-07 PROCEDURE — 87040 BLOOD CULTURE FOR BACTERIA: CPT | Performed by: EMERGENCY MEDICINE

## 2018-09-07 PROCEDURE — 25010000002 METHYLPREDNISOLONE PER 125 MG: Performed by: EMERGENCY MEDICINE

## 2018-09-07 PROCEDURE — 99284 EMERGENCY DEPT VISIT MOD MDM: CPT

## 2018-09-07 PROCEDURE — 85379 FIBRIN DEGRADATION QUANT: CPT | Performed by: EMERGENCY MEDICINE

## 2018-09-07 PROCEDURE — G0378 HOSPITAL OBSERVATION PER HR: HCPCS

## 2018-09-07 PROCEDURE — 36600 WITHDRAWAL OF ARTERIAL BLOOD: CPT

## 2018-09-07 PROCEDURE — 80053 COMPREHEN METABOLIC PANEL: CPT | Performed by: EMERGENCY MEDICINE

## 2018-09-07 PROCEDURE — 25010000002 METHYLPREDNISOLONE PER 40 MG: Performed by: INTERNAL MEDICINE

## 2018-09-07 PROCEDURE — 94640 AIRWAY INHALATION TREATMENT: CPT

## 2018-09-07 PROCEDURE — 82805 BLOOD GASES W/O2 SATURATION: CPT

## 2018-09-07 PROCEDURE — 85025 COMPLETE CBC W/AUTO DIFF WBC: CPT | Performed by: EMERGENCY MEDICINE

## 2018-09-07 PROCEDURE — 94762 N-INVAS EAR/PLS OXIMTRY CONT: CPT

## 2018-09-07 PROCEDURE — 94799 UNLISTED PULMONARY SVC/PX: CPT

## 2018-09-07 PROCEDURE — 25010000002 CEFTRIAXONE: Performed by: INTERNAL MEDICINE

## 2018-09-07 PROCEDURE — 82375 ASSAY CARBOXYHB QUANT: CPT

## 2018-09-07 PROCEDURE — 25010000002 AZITHROMYCIN: Performed by: INTERNAL MEDICINE

## 2018-09-07 PROCEDURE — 93005 ELECTROCARDIOGRAM TRACING: CPT | Performed by: EMERGENCY MEDICINE

## 2018-09-07 PROCEDURE — 83050 HGB METHEMOGLOBIN QUAN: CPT

## 2018-09-07 PROCEDURE — 84484 ASSAY OF TROPONIN QUANT: CPT | Performed by: EMERGENCY MEDICINE

## 2018-09-07 PROCEDURE — 71045 X-RAY EXAM CHEST 1 VIEW: CPT

## 2018-09-07 RX ORDER — ONDANSETRON 4 MG/1
4 TABLET, ORALLY DISINTEGRATING ORAL EVERY 6 HOURS PRN
Status: DISCONTINUED | OUTPATIENT
Start: 2018-09-07 | End: 2018-09-09 | Stop reason: HOSPADM

## 2018-09-07 RX ORDER — POTASSIUM CHLORIDE 750 MG/1
40 CAPSULE, EXTENDED RELEASE ORAL ONCE
Status: COMPLETED | OUTPATIENT
Start: 2018-09-07 | End: 2018-09-07

## 2018-09-07 RX ORDER — NICOTINE 21 MG/24HR
1 PATCH, TRANSDERMAL 24 HOURS TRANSDERMAL EVERY 24 HOURS
Status: DISCONTINUED | OUTPATIENT
Start: 2018-09-07 | End: 2018-09-09 | Stop reason: HOSPADM

## 2018-09-07 RX ORDER — SOTALOL HYDROCHLORIDE 80 MG/1
80 TABLET ORAL 2 TIMES DAILY
Status: DISCONTINUED | OUTPATIENT
Start: 2018-09-07 | End: 2018-09-09 | Stop reason: HOSPADM

## 2018-09-07 RX ORDER — CEFDINIR 300 MG/1
300 CAPSULE ORAL 2 TIMES DAILY
COMMUNITY
End: 2018-09-11

## 2018-09-07 RX ORDER — LORAZEPAM 0.5 MG/1
1 TABLET ORAL EVERY 6 HOURS PRN
Status: DISCONTINUED | OUTPATIENT
Start: 2018-09-07 | End: 2018-09-09 | Stop reason: HOSPADM

## 2018-09-07 RX ORDER — METHYLPREDNISOLONE SODIUM SUCCINATE 125 MG/2ML
125 INJECTION, POWDER, LYOPHILIZED, FOR SOLUTION INTRAMUSCULAR; INTRAVENOUS ONCE
Status: COMPLETED | OUTPATIENT
Start: 2018-09-07 | End: 2018-09-07

## 2018-09-07 RX ORDER — DILTIAZEM HYDROCHLORIDE 5 MG/ML
20 INJECTION INTRAVENOUS ONCE
Status: COMPLETED | OUTPATIENT
Start: 2018-09-07 | End: 2018-09-07

## 2018-09-07 RX ORDER — SODIUM CHLORIDE 0.9 % (FLUSH) 0.9 %
10 SYRINGE (ML) INJECTION AS NEEDED
Status: DISCONTINUED | OUTPATIENT
Start: 2018-09-07 | End: 2018-09-09 | Stop reason: HOSPADM

## 2018-09-07 RX ORDER — BUDESONIDE 0.25 MG/2ML
0.25 INHALANT ORAL
Status: DISCONTINUED | OUTPATIENT
Start: 2018-09-07 | End: 2018-09-09 | Stop reason: HOSPADM

## 2018-09-07 RX ORDER — ONDANSETRON 4 MG/1
4 TABLET, FILM COATED ORAL EVERY 6 HOURS PRN
Status: DISCONTINUED | OUTPATIENT
Start: 2018-09-07 | End: 2018-09-09 | Stop reason: HOSPADM

## 2018-09-07 RX ORDER — IPRATROPIUM BROMIDE AND ALBUTEROL SULFATE 2.5; .5 MG/3ML; MG/3ML
9 SOLUTION RESPIRATORY (INHALATION) ONCE
Status: COMPLETED | OUTPATIENT
Start: 2018-09-07 | End: 2018-09-07

## 2018-09-07 RX ORDER — ONDANSETRON 2 MG/ML
4 INJECTION INTRAMUSCULAR; INTRAVENOUS EVERY 6 HOURS PRN
Status: DISCONTINUED | OUTPATIENT
Start: 2018-09-07 | End: 2018-09-09 | Stop reason: HOSPADM

## 2018-09-07 RX ORDER — SODIUM CHLORIDE 0.9 % (FLUSH) 0.9 %
1-10 SYRINGE (ML) INJECTION AS NEEDED
Status: DISCONTINUED | OUTPATIENT
Start: 2018-09-07 | End: 2018-09-09 | Stop reason: HOSPADM

## 2018-09-07 RX ORDER — SOTALOL HYDROCHLORIDE 80 MG/1
80 TABLET ORAL ONCE
Status: COMPLETED | OUTPATIENT
Start: 2018-09-07 | End: 2018-09-07

## 2018-09-07 RX ORDER — LEVOFLOXACIN 5 MG/ML
750 INJECTION, SOLUTION INTRAVENOUS ONCE
Status: COMPLETED | OUTPATIENT
Start: 2018-09-07 | End: 2018-09-07

## 2018-09-07 RX ORDER — SODIUM CHLORIDE 9 MG/ML
75 INJECTION, SOLUTION INTRAVENOUS CONTINUOUS
Status: DISCONTINUED | OUTPATIENT
Start: 2018-09-07 | End: 2018-09-09 | Stop reason: HOSPADM

## 2018-09-07 RX ORDER — AMLODIPINE BESYLATE 5 MG/1
10 TABLET ORAL DAILY
Status: DISCONTINUED | OUTPATIENT
Start: 2018-09-07 | End: 2018-09-09 | Stop reason: HOSPADM

## 2018-09-07 RX ORDER — ACETAMINOPHEN 325 MG/1
650 TABLET ORAL EVERY 4 HOURS PRN
Status: DISCONTINUED | OUTPATIENT
Start: 2018-09-07 | End: 2018-09-09 | Stop reason: HOSPADM

## 2018-09-07 RX ORDER — METHYLPREDNISOLONE SODIUM SUCCINATE 40 MG/ML
40 INJECTION, POWDER, LYOPHILIZED, FOR SOLUTION INTRAMUSCULAR; INTRAVENOUS EVERY 8 HOURS
Status: DISCONTINUED | OUTPATIENT
Start: 2018-09-07 | End: 2018-09-08

## 2018-09-07 RX ADMIN — SODIUM CHLORIDE 75 ML/HR: 9 INJECTION, SOLUTION INTRAVENOUS at 20:40

## 2018-09-07 RX ADMIN — DILTIAZEM HYDROCHLORIDE 20 MG: 5 INJECTION INTRAVENOUS at 05:19

## 2018-09-07 RX ADMIN — BUDESONIDE 0.25 MG: 0.25 INHALANT RESPIRATORY (INHALATION) at 19:37

## 2018-09-07 RX ADMIN — SOTALOL HYDROCHLORIDE 80 MG: 80 TABLET ORAL at 10:58

## 2018-09-07 RX ADMIN — AMLODIPINE BESYLATE 10 MG: 5 TABLET ORAL at 21:00

## 2018-09-07 RX ADMIN — LEVOFLOXACIN 750 MG: 5 INJECTION, SOLUTION INTRAVENOUS at 07:39

## 2018-09-07 RX ADMIN — IPRATROPIUM BROMIDE AND ALBUTEROL SULFATE 9 ML: .5; 3 SOLUTION RESPIRATORY (INHALATION) at 05:29

## 2018-09-07 RX ADMIN — DILTIAZEM HYDROCHLORIDE 20 MG: 5 INJECTION INTRAVENOUS at 10:44

## 2018-09-07 RX ADMIN — METHYLPREDNISOLONE SODIUM SUCCINATE 125 MG: 125 INJECTION, POWDER, FOR SOLUTION INTRAMUSCULAR; INTRAVENOUS at 05:17

## 2018-09-07 RX ADMIN — METHYLPREDNISOLONE SODIUM SUCCINATE 40 MG: 40 INJECTION, POWDER, FOR SOLUTION INTRAMUSCULAR; INTRAVENOUS at 21:00

## 2018-09-07 RX ADMIN — NICOTINE 1 PATCH: 21 PATCH TRANSDERMAL at 21:00

## 2018-09-07 RX ADMIN — AZITHROMYCIN 500 MG: 500 INJECTION, POWDER, LYOPHILIZED, FOR SOLUTION INTRAVENOUS at 21:05

## 2018-09-07 RX ADMIN — POTASSIUM CHLORIDE 40 MEQ: 750 CAPSULE, EXTENDED RELEASE ORAL at 07:35

## 2018-09-07 RX ADMIN — RIVAROXABAN 20 MG: 10 TABLET, FILM COATED ORAL at 21:00

## 2018-09-07 RX ADMIN — IPRATROPIUM BROMIDE 0.5 MG: 0.5 SOLUTION RESPIRATORY (INHALATION) at 19:37

## 2018-09-07 RX ADMIN — CEFTRIAXONE 1 G: 1 INJECTION, POWDER, FOR SOLUTION INTRAMUSCULAR; INTRAVENOUS at 20:40

## 2018-09-07 RX ADMIN — SODIUM CHLORIDE 1000 ML: 9 INJECTION, SOLUTION INTRAVENOUS at 05:16

## 2018-09-07 NOTE — ED NOTES
[facility] paging Dr. [place], [facility] hospitalist, per Dr. Frank @ [phone]     Estela Molina  09/07/18 0657

## 2018-09-07 NOTE — ED NOTES
Called [facility] back to check on bed status. Currently still awaiting on a bed.     Bebe Orellanatany  09/07/18 9342

## 2018-09-07 NOTE — ED NOTES
Dinner tray given to patient.  Dr. Santiago accepted patient and will be admitted here.  Waiting on bed assignment.     Milana Bella RN  09/07/18 3319

## 2018-09-07 NOTE — ED NOTES
PT stated that she was getting restless and has been here for almost 14 hours.  Says she would be agreeable to stay here if they had any beds.  HEMAL Alicea RN spoke with Dr. Huizar and will call hospitalist.      Milana Bella, RN  09/07/18 0169

## 2018-09-07 NOTE — ED NOTES
PT given warm blanket and cardiac diet ordered.  Waiting on bed at [facility].     Milana Bella RN  09/07/18 2013

## 2018-09-07 NOTE — H&P
[facility] Medicine Services  HISTORY AND PHYSICAL    Primary Care Physician: Provider, No Known    Subjective     Chief Complaint:    Chief Complaint   Patient presents with   • Shortness of Breath       History of Present Illness:     I have reviewed labs/imaging/records from this hospitalization, including ER staff to establish a comprehensive understanding of this patient's clinical issues, as well as to establish plan of care appropriately.     Patient is a 58 year old female with medical history of paroxysmal atrial fibrillation on anticoagulation therapy, active nicotine abuse, hypertension and suspected COPD, who presented to the ER last night with complaints of shortness of breath and persistent cough. Patient states that she has been having a incessant cough associated with shortness of breath for the last 1 week. Cough has been mostly non-productive. She has had some wheezing but no fevers or chills. She came to the ER on 09/02 and was sent home after a dose of Decadron and was prescribed albuterol inhaler as her symptoms improved with duonebs and all her work up was essentially unremarkable. However, her symptoms persisted, so she went to her primary care doctor on Tuesday where she again received a shot of steroids and was prescribed oral antibiotics- Cefdinir. She took the antibiotics and nebulizer treatments along with multiple doses of inhalers a kathya (albuterol), without any significant relief, so she returned to the ER for evaluation. She has no other complaints of chest pain, palpitations, headache, dizziness, diarrhea, dysuria or abdominal pain. Patient is a current smoker, smokes about 1ppd and has been for many years.     On arrival to the ER last night, she was found to be tachycardic and EKG revealed A. Fib with RVR. Laboratory work up notable for potassium of 3.4, normal troponin and D-dimer. ProBNP elevated 2110. CBC unremarkable. A CXR was obtained which per the ER  physician's evaluation showed a possible left lower lobe infiltrate. She was given multiple doses of Cardizem and her home sotalol dose with improvement in her heart rate. She was also given 125mg of Solumedrol, Levaquin and 40meq of potassium. Due to lack of beds are our facility, she was to be transferred to [facility] where her primary cardiologist is also located. However, patient was on the waiting list for about 13 hours and they did not have a bed and therefore, she is being admitted to the [facility] as we now do have bed available.     Review of Systems   1. Constitutional: Negative for fever. Negative for chills, diaphoresis, fatigue and unexpected weight change.   2. HENT: Negative for congestion and hearing loss.   3. Eyes: Negative for redness and visual disturbance.   4. Respiratory: + shortness of breath. Negative for chest pain . + cough and chest tightness.   5. Cardiovascular: Negative for chest pain and palpitations.   6. Gastrointestinal: Negative for abdominal distention, abdominal pain and blood in stool.   7. Endocrine: Negative for cold intolerance and heat intolerance.   8. Genitourinary: Negative for difficulty urinating, dysuria and frequency.   9. Musculoskeletal: Negative for arthralgias, back pain and myalgias.   10. Skin: Negative for color change, rash and wound.   11. Neurological: Negative for syncope, weakness and headaches.   12. Hematological: Negative for adenopathy. Does not bruise/bleed easily.   13. Psychiatric/Behavioral: Negative for confusion. The patient is not nervous/anxious.     Past Medical History:   Past Medical History:   Diagnosis Date   • Atrial fibrillation (CMS/HCC)     CHADS-VASC = 2   • Hypertension        Past Surgical History:History reviewed. No pertinent surgical history.    Family History: family history includes Alcohol abuse in her maternal grandfather; Cancer in her maternal aunt and mother; Heart disease in her maternal grandfather.    Social  "History:  reports that she has been smoking Cigarettes.  She has a 22.00 pack-year smoking history. She has never used smokeless tobacco. She reports that she does not drink alcohol or use drugs.    Medications:  Prescriptions Prior to Admission   Medication Sig Dispense Refill Last Dose   • albuterol (PROVENTIL) (5 MG/ML) 0.5% nebulizer solution Take 2.5 mg by nebulization Every 6 (Six) Hours As Needed for Wheezing.      • cefdinir (OMNICEF) 300 MG capsule Take 300 mg by mouth 2 (Two) Times a Day.      • acetaminophen (TYLENOL) 500 MG tablet Take 500 mg by mouth Every 6 (Six) Hours As Needed for mild pain (1-3).   Taking   • amLODIPine (NORVASC) 10 MG tablet TAKE ONE TABLET BY MOUTH ONCE DAILY 90 tablet 3 Taking   • sotalol (BETAPACE) 80 MG tablet TAKE ONE TABLET BY MOUTH TWICE DAILY 60 tablet 6 Taking   • XARELTO 20 MG tablet TAKE ONE TABLET BY MOUTH ONCE DAILY WITH  DINNER 30 tablet 5 Taking       Allergies:  No Known Allergies      Objective     Physical Exam:  Vital Signs: /77   Pulse 96   Temp 98 °F (36.7 °C) (Oral)   Resp 16   Ht 177.8 cm (70\")   Wt 68.8 kg (151 lb 9.6 oz)   LMP  (LMP Unknown)   SpO2 95%   BMI 21.75 kg/m²      Physical Exam:     General Appearance:   Alert, cooperative, in no acute distress.     Head:   Normocephalic, without obvious abnormality, atraumatic.     Eyes:       Normal, conjunctivae and sclerae, no icterus, no pallor, corneas clear, PERRLA        Throat:   Oral mucosa dry      Neck:  No adenopathy, supple, trachea midline, no thyromegaly, no carotid bruit, no JVD      Back:   No CVA tenderness on Percussion.     Lungs:   Unlabored breathing, fair air movement bilaterally, minimal expiratory wheezing in posterior lung fields, no rhonchi or crackles       Heart:   Irregular rhythm and normal rate, normal S1 and S2.       Abdomen:   Obese. Normal bowel sounds, no masses, no organomegaly, soft non-tender, non-distended, no guarding, no rebound tenderness      "   Extremities:  Moves all extremities, no edema, no cyanosis, no redness.     Pulses:  Pulses palpable and equal bilaterally but weak.     Skin:  No bleeding, bruising or rash        Neurologic:  Cranial nerves grossly intact, move all extremities         Results Review:  Lab Results (last 7 days)     Procedure Component Value Units Date/Time    Procalcitonin [[id number]]  (Normal) Collected:  09/07/18 0451    Specimen:  Blood Updated:  09/07/18 1854     Procalcitonin <0.05 ng/mL     Narrative:       As a Marker for Sepsis (Non-Neonates):   1. <0.5 ng/mL represents a low risk of severe sepsis and/or septic shock.  2. >2 ng/mL represents a high risk of severe sepsis and/or septic shock.    As a Marker for Lower Respiratory Tract Infections that require antibiotic therapy:    PCT on Admission     Antibiotic Therapy       6-12 Hrs later  > 0.5                Strongly Recommended             >0.25 - <0.5         Recommended  0.1 - 0.25           Discouraged              Remeasure/reassess PCT  <0.1                 Strongly Discouraged     Remeasure/reassess PCT                     PCT values of < 0.5 ng/mL do not exclude an infection, because localized infections (without systemic signs) may be associated with such low concentrations, or a systemic infection in its initial stages (< 6 hours). Furthermore, increased PCT can occur without infection. PCT concentrations between 0.5 and 2.0 ng/mL should be interpreted taking into account the patient's history. It is recommended to retest PCT within 6-24 hours if any concentrations < 2 ng/mL are obtained.    Blood Culture With MIREILLE - Blood, [[id number]] Collected:  09/07/18 0716    Specimen:  Blood from Arm, Left Updated:  09/07/18 0735    Blood Culture With MIREILLE - Blood, [[id number]] Collected:  09/07/18 0724    Specimen:  Blood from Arm, Left Updated:  09/07/18 0734    [place] Draw [[id number]] Collected:  09/07/18 0451    Specimen:  Blood Updated:  09/07/18 0600    Narrative:        The following orders were created for panel order [place] Draw.  Procedure                               Abnormality         Status                     ---------                               -----------         ------                     Light Blue Top[[id number]]                                   Final result               Lavender Top[[id number]]                                     Final result               Gold Top - SST[[id number]]                                   Final result               Green Top (No Gel)[[id number]]                               Final result                 Please view results for these tests on the individual orders.    Lavender Top [[id number]] Collected:  09/07/18 0451    Specimen:  Blood Updated:  09/07/18 0600     Extra Tube hold for add-on     Comment: Auto resulted       Light Blue Top [[id number]] Collected:  09/07/18 0451    Specimen:  Blood Updated:  09/07/18 0600     Extra Tube hold for add-on     Comment: Auto resulted       Gold Top - SST [[id number]] Collected:  09/07/18 0451    Specimen:  Blood Updated:  09/07/18 0600     Extra Tube Hold for add-ons.     Comment: Auto resulted.       Green Top (No Gel) [[id number]] Collected:  09/07/18 0451    Specimen:  Blood Updated:  09/07/18 0600     Extra Tube Hold for add-ons.     Comment: Auto resulted.       D-dimer, Quantitative [[id number]]  (Normal) Collected:  09/07/18 0451    Specimen:  Blood Updated:  09/07/18 0554     D-Dimer, Quantitative 358 ng/mL (FEU)     Blood Gas, Arterial With Co-Ox [[id number]]  (Abnormal) Collected:  09/07/18 0548    Specimen:  Arterial Blood Updated:  09/07/18 0549     Site Right Brachial     Alli's Test N/A     pH, Arterial 7.525 (C) pH units      pCO2, Arterial 30.1 (L) mm Hg      pO2, Arterial 71.1 (L) mm Hg      HCO3, Arterial 24.8 mmol/L      Base Excess, Arterial 2.8 (H) mmol/L      O2 Saturation, Arterial 96.1 %      Hemoglobin, Blood Gas 14.0 g/dL      Hematocrit, Blood Gas 43.0 %       Oxyhemoglobin 94.8 %      Methemoglobin 0.70 %      Carboxyhemoglobin 0.7 %      Barometric Pressure for Blood Gas 738 mmHg      Modality Room Air     FIO2 21 %      Ventilator Mode NA     Collected by salena     pH, Temp Corrected -- pH Units      pCO2, Temperature Corrected -- mm Hg      pO2, Temperature Corrected -- mm Hg     BNP [[id number]]  (Abnormal) Collected:  09/07/18 0451    Specimen:  Blood Updated:  09/07/18 0530     proBNP 2,110.0 (C) pg/mL     Comprehensive Metabolic Panel [[id number]]  (Abnormal) Collected:  09/07/18 0451    Specimen:  Blood Updated:  09/07/18 0527     Glucose 112 (H) mg/dL      BUN 6 (L) mg/dL      Creatinine 0.70 mg/dL      Sodium 141 mmol/L      Potassium 3.4 (L) mmol/L      Chloride 107 mmol/L      CO2 24.0 (L) mmol/L      Calcium 9.2 mg/dL      Total Protein 7.5 g/dL      Albumin 4.20 g/dL      ALT (SGPT) 14 U/L      AST (SGOT) 20 U/L      Alkaline Phosphatase 106 U/L      Total Bilirubin 0.7 mg/dL      eGFR Non African Amer 85 mL/min/1.73      Globulin 3.3 gm/dL      A/G Ratio 1.3 g/dL      BUN/Creatinine Ratio 8.6     Anion Gap 13.4 mmol/L     Troponin [[id number]]  (Normal) Collected:  09/07/18 0451    Specimen:  Blood Updated:  09/07/18 0527     Troponin I <0.012 ng/mL     CBC & Differential [[id number]] Collected:  09/07/18 0451    Specimen:  Blood Updated:  09/07/18 0510    Narrative:       The following orders were created for panel order CBC & Differential.  Procedure                               Abnormality         Status                     ---------                               -----------         ------                     CBC Auto Differential[[id number]]        Abnormal            Final result                 Please view results for these tests on the individual orders.    CBC Auto Differential [[id number]]  (Abnormal) Collected:  09/07/18 0451    Specimen:  Blood Updated:  09/07/18 0510     WBC 9.67 10*3/mm3      RBC 4.92 10*6/mm3      Hemoglobin 14.7 g/dL      Hematocrit  43.9 %      MCV 89.2 fL      MCH 29.9 pg      MCHC 33.5 g/dL      RDW 12.9 %      RDW-SD 42.5 fl      MPV 9.5 fL      Platelets 278 10*3/mm3      Neutrophil % 64.9 %      Lymphocyte % 23.7 %      Monocyte % 9.6 %      Eosinophil % 1.3 %      Basophil % 0.3 %      Immature Grans % 0.2 %      Neutrophils, Absolute 6.27 10*3/mm3      Lymphocytes, Absolute 2.29 10*3/mm3      Monocytes, Absolute 0.93 (H) 10*3/mm3      Eosinophils, Absolute 0.13 10*3/mm3      Basophils, Absolute 0.03 10*3/mm3      Immature Grans, Absolute 0.02 10*3/mm3      nRBC 0.0 /100 WBC         Imaging Results (last 72 hours)     Procedure Component Value Units Date/Time    XR Chest 1 View [[id number]] Collected:  09/07/18 0835     Updated:  09/07/18 0838    Narrative:       PROCEDURE: XR CHEST 1 VW-     HISTORY: sob     COMPARISON: September 2, 2018.     FINDINGS: The heart is normal in size. The mediastinum is unremarkable.  There are chronic interstitial lung changes. No focal opacities were  effusions. There is no pneumothorax.  There are no acute osseous  abnormalities.           Impression:       No acute cardiopulmonary process.     Continued followup is recommended.     This report was finalized on 9/7/2018 8:36 AM by Saroj Corea M.D..        I have personally reviewed and interpreted available lab data, radiology studies and ECG obtained at time of admission.     Assessment / Plan     Assessment/Problem List:   Active Problems:    Pneumonia of left lower lobe due to infectious organism (CMS/[facility])    1. A. Fib with RVR, POA  2. Acute bronchitis vs. Community acquired left lower lobe pneumonia, due to unknown organism, POA  3. Suspect acute COPD exacerbation  4. Active nicotine abuse  5. Hypertension  6. Hypokalemia, replaced    Plan:  Admit to med-surg with telemetry. I have checked a procalcitonin level which is essentially negative, will repeat another one tomorrow morning, if remains negative, consider discontinuation of antibiotics.  I will continue the patient on Solumedrol 40mg q8h along with nebulized steroids. Will avoid albuterol and placed on Atrovent only. Obtain sputum culture if she's able to provide a sample. Smoking cessation encouraged. She is willing to try nicotine patch while hospitalized.   Continue home doses of Sotalol and continue on Xarelto.     Details were discussed with the patient.   Further recommendations will depend on clinical course of the patient during the current hospitalization.    I also discussed the details with the nursing staff.  Rest as ordered.    Anticipated stay is less than 2 midnights.    Sara Santiago MD 09/07/18 7:05 PM    Dictated using Dragon.

## 2018-09-07 NOTE — ED NOTES
MD notified of patient in Afib with RVR with HR of 130's and pt she has not had her Sotalol this am.        Milana Bella RN  09/07/18 1047

## 2018-09-07 NOTE — ED PROVIDER NOTES
Subjective   58-year-old female presenting with shortness of breath.  She states that for the last week she has felt increasingly short of breath.  She has had cough and wheezing.  Initially she was seen here in the ED for her symptoms and was given Decadron by mouth and an inhaler.  This was not helping so she was seen in urgent care a few days later and given Cefdinir.  She states this also isn't helping.  She denies any fevers, chills, nausea, vomiting, chest pain or other complaints.            Review of Systems   Constitutional: Negative.    HENT: Negative.    Eyes: Negative.    Respiratory: Positive for cough, shortness of breath and wheezing.    Cardiovascular: Negative.    Gastrointestinal: Negative.    Genitourinary: Negative.    Musculoskeletal: Negative.    Skin: Negative.    Neurological: Negative.    Psychiatric/Behavioral: Negative.        Past Medical History:   Diagnosis Date   • Atrial fibrillation (CMS/HCC)     CHADS-VASC = 2   • Hypertension        No Known Allergies    History reviewed. No pertinent surgical history.    Family History   Problem Relation Age of Onset   • Cancer Mother    • Cancer Maternal Aunt    • Alcohol abuse Maternal Grandfather    • Heart disease Maternal Grandfather        Social History     Social History   • Marital status:      Social History Main Topics   • Smoking status: Current Every Day Smoker     Packs/day: 1.00     Years: 22.00     Types: Cigarettes   • Smokeless tobacco: Never Used   • Alcohol use No   • Drug use: No   • Sexual activity: No     Other Topics Concern   • Not on file           Objective   Physical Exam   Constitutional: She is oriented to person, place, and time. She appears well-developed and well-nourished. No distress.   HENT:   Head: Normocephalic and atraumatic.   Right Ear: External ear normal.   Left Ear: External ear normal.   Nose: Nose normal.   Mouth/Throat: Oropharynx is clear and moist.   Eyes: Pupils are equal, round, and  reactive to light. Conjunctivae and EOM are normal.   Neck: Normal range of motion. Neck supple.   Cardiovascular: Normal heart sounds and intact distal pulses.    Irregularly irregular, rapid rate   Pulmonary/Chest: Effort normal. No respiratory distress.   Diffuse expiratory wheezing   Abdominal: Soft. Bowel sounds are normal. She exhibits no distension. There is no tenderness. There is no rebound and no guarding.   Musculoskeletal: Normal range of motion. She exhibits no edema, tenderness or deformity.   Neurological: She is alert and oriented to person, place, and time.   Skin: Skin is warm and dry. No rash noted.   Psychiatric: She has a normal mood and affect. Her behavior is normal.   Nursing note and vitals reviewed.      Procedures           ED Course  ED Course as of Sep 07 1921   Fri Sep 07, 2018   1756 After spending all day in our emergency departmentwithout having a bed opens up at [facility] where she was accepted, we do have beds here now and can take care of this patient.  Patient is willing to stay.  I did discuss this with Dr. Santiago who graciously accepted the patient.  [DT]      ED Course User Index  [DT] Jules Huizar MD                  MDM  Number of Diagnoses or Management Options  Atrial fibrillation with rapid ventricular response (CMS/HCC):   Pneumonia of left lower lobe due to infectious organism (CMS/HCC):   Diagnosis management comments: 58-year-old female with shortness of breath.  Well-developed, well-nourished female in no distress with exam as above.  She no way has wheezing throughout and is in atrial fibrillation with a rapid ventricular rate.  She does not know when this started.  We'll check labs to include a d-dimer.  We'll treat symptomatically.  Disposition and workup.    DDX: COPD, pneumonia, bronchitis, viral illness, atrial fibrillation, PE    EKG interpreted by me:  atrial fibrillation, rapid ventricular rate, inferior/lateral ST depression, this is an  abnormal EKG, compared to previous atrial fibrillation has replaced a sinus rhythm    6:50 AM Chest x-ray per my interpretation reveals left lower lobe infiltrate which is new from 6 days ago.  Labwork notable for elevated BNP.  She feels no better after treatment, still wheezing.  She remains in atrial fibrillation.  We have no beds here, patient's cardiologist is in [place] at [facility] [place], operated by [facility].  Given this paged hospitalist at [facility] for transfer.  I have given her Levaquin since she was already on cefdinir. Awaiting call back.    7:22 AM Discussed with Dr Desai, accepted in transfer.       Amount and/or Complexity of Data Reviewed  Clinical lab tests: reviewed  Tests in the radiology section of CPT®: reviewed  Decide to obtain previous medical records or to obtain history from someone other than the patient: yes    Critical Care  Total time providing critical care: 30-74 minutes        Final diagnoses:   Pneumonia of left lower lobe due to infectious organism (CMS/HCC)   Atrial fibrillation with rapid ventricular response (CMS/HCC)            Adarsh Frank MD  09/07/18 0700       Adarsh Frank MD  09/07/18 1580

## 2018-09-07 NOTE — ED NOTES
Called [facility] to check on bed status. No beds available at this time.      Haritha Orellana  09/07/18 4189

## 2018-09-08 LAB
ANION GAP SERPL CALCULATED.3IONS-SCNC: 11.9 MMOL/L (ref 10–20)
BASOPHILS # BLD AUTO: 0 10*3/MM3 (ref 0–0.2)
BASOPHILS NFR BLD AUTO: 0 % (ref 0–2.5)
BUN BLD-MCNC: 10 MG/DL (ref 7–20)
BUN/CREAT SERPL: 16.7 (ref 7.1–23.5)
CALCIUM SPEC-SCNC: 9.2 MG/DL (ref 8.4–10.2)
CHLORIDE SERPL-SCNC: 108 MMOL/L (ref 98–107)
CO2 SERPL-SCNC: 24 MMOL/L (ref 26–30)
CREAT BLD-MCNC: 0.6 MG/DL (ref 0.6–1.3)
DEPRECATED RDW RBC AUTO: 42.5 FL (ref 37–54)
EOSINOPHIL # BLD AUTO: 0 10*3/MM3 (ref 0–0.7)
EOSINOPHIL NFR BLD AUTO: 0 % (ref 0–7)
ERYTHROCYTE [DISTWIDTH] IN BLOOD BY AUTOMATED COUNT: 13 % (ref 11.5–14.5)
GFR SERPL CREATININE-BSD FRML MDRD: 102 ML/MIN/1.73
GLUCOSE BLD-MCNC: 133 MG/DL (ref 74–98)
HCT VFR BLD AUTO: 38.2 % (ref 37–47)
HGB BLD-MCNC: 12.9 G/DL (ref 12–16)
IMM GRANULOCYTES # BLD: 0.04 10*3/MM3 (ref 0–0.06)
IMM GRANULOCYTES NFR BLD: 0.5 % (ref 0–0.6)
LYMPHOCYTES # BLD AUTO: 0.87 10*3/MM3 (ref 0.6–3.4)
LYMPHOCYTES NFR BLD AUTO: 10 % (ref 10–50)
MCH RBC QN AUTO: 30.1 PG (ref 27–31)
MCHC RBC AUTO-ENTMCNC: 33.8 G/DL (ref 30–37)
MCV RBC AUTO: 89.3 FL (ref 81–99)
MONOCYTES # BLD AUTO: 0.21 10*3/MM3 (ref 0–0.9)
MONOCYTES NFR BLD AUTO: 2.4 % (ref 0–12)
NEUTROPHILS # BLD AUTO: 7.54 10*3/MM3 (ref 2–6.9)
NEUTROPHILS NFR BLD AUTO: 87.1 % (ref 37–80)
NRBC BLD MANUAL-RTO: 0 /100 WBC (ref 0–0)
PLATELET # BLD AUTO: 248 10*3/MM3 (ref 130–400)
PMV BLD AUTO: 10 FL (ref 6–12)
POTASSIUM BLD-SCNC: 3.9 MMOL/L (ref 3.5–5.1)
PROCALCITONIN SERPL-MCNC: <0.05 NG/ML
RBC # BLD AUTO: 4.28 10*6/MM3 (ref 4.2–5.4)
SODIUM BLD-SCNC: 140 MMOL/L (ref 137–145)
WBC NRBC COR # BLD: 8.66 10*3/MM3 (ref 4.8–10.8)

## 2018-09-08 PROCEDURE — 99231 SBSQ HOSP IP/OBS SF/LOW 25: CPT | Performed by: HOSPITALIST

## 2018-09-08 PROCEDURE — 25010000002 METHYLPREDNISOLONE PER 40 MG: Performed by: INTERNAL MEDICINE

## 2018-09-08 PROCEDURE — 85025 COMPLETE CBC W/AUTO DIFF WBC: CPT | Performed by: INTERNAL MEDICINE

## 2018-09-08 PROCEDURE — 80048 BASIC METABOLIC PNL TOTAL CA: CPT | Performed by: INTERNAL MEDICINE

## 2018-09-08 PROCEDURE — 94799 UNLISTED PULMONARY SVC/PX: CPT

## 2018-09-08 PROCEDURE — 84145 PROCALCITONIN (PCT): CPT | Performed by: INTERNAL MEDICINE

## 2018-09-08 RX ORDER — METHYLPREDNISOLONE SODIUM SUCCINATE 40 MG/ML
40 INJECTION, POWDER, LYOPHILIZED, FOR SOLUTION INTRAMUSCULAR; INTRAVENOUS EVERY 12 HOURS
Status: DISCONTINUED | OUTPATIENT
Start: 2018-09-08 | End: 2018-09-09 | Stop reason: HOSPADM

## 2018-09-08 RX ADMIN — SOTALOL HYDROCHLORIDE 80 MG: 80 TABLET ORAL at 08:24

## 2018-09-08 RX ADMIN — METHYLPREDNISOLONE SODIUM SUCCINATE 40 MG: 40 INJECTION, POWDER, FOR SOLUTION INTRAMUSCULAR; INTRAVENOUS at 11:34

## 2018-09-08 RX ADMIN — AMLODIPINE BESYLATE 10 MG: 5 TABLET ORAL at 08:24

## 2018-09-08 RX ADMIN — IPRATROPIUM BROMIDE 0.5 MG: 0.5 SOLUTION RESPIRATORY (INHALATION) at 07:26

## 2018-09-08 RX ADMIN — SOTALOL HYDROCHLORIDE 80 MG: 80 TABLET ORAL at 21:21

## 2018-09-08 RX ADMIN — RIVAROXABAN 20 MG: 10 TABLET, FILM COATED ORAL at 17:59

## 2018-09-08 RX ADMIN — BUDESONIDE 0.25 MG: 0.25 INHALANT RESPIRATORY (INHALATION) at 19:23

## 2018-09-08 RX ADMIN — METHYLPREDNISOLONE SODIUM SUCCINATE 40 MG: 40 INJECTION, POWDER, FOR SOLUTION INTRAMUSCULAR; INTRAVENOUS at 03:42

## 2018-09-08 RX ADMIN — NICOTINE 1 PATCH: 21 PATCH TRANSDERMAL at 21:22

## 2018-09-08 RX ADMIN — IPRATROPIUM BROMIDE 0.5 MG: 0.5 SOLUTION RESPIRATORY (INHALATION) at 19:23

## 2018-09-08 RX ADMIN — BUDESONIDE 0.25 MG: 0.25 INHALANT RESPIRATORY (INHALATION) at 07:26

## 2018-09-08 NOTE — PLAN OF CARE
Problem: Patient Care Overview  Goal: Plan of Care Review   09/08/18 1517   Coping/Psychosocial   Plan of Care Reviewed With patient   Plan of Care Review   Progress no change   OTHER   Outcome Summary Patient alert and oriented. Receiving solumedrol and NEB txts. Productive cough. Remains in A-Fib with tachy heart rate. Will continu to monitor.

## 2018-09-08 NOTE — PLAN OF CARE
Problem: Patient Care Overview  Goal: Plan of Care Review  Outcome: Ongoing (interventions implemented as appropriate)   09/07/18 6359   Coping/Psychosocial   Plan of Care Reviewed With patient   Plan of Care Review   Progress no change   OTHER   Outcome Summary NEW ADMISSION     Goal: Individualization and Mutuality  Outcome: Ongoing (interventions implemented as appropriate)    Goal: Discharge Needs Assessment  Outcome: Ongoing (interventions implemented as appropriate)      Problem: Pneumonia (Adult)  Goal: Signs and Symptoms of Listed Potential Problems Will be Absent, Minimized or Managed (Pneumonia)  Outcome: Ongoing (interventions implemented as appropriate)      Problem: Arrhythmia/Dysrhythmia (Symptomatic) (Adult)  Goal: Signs and Symptoms of Listed Potential Problems Will be Absent, Minimized or Managed (Arrhythmia/Dysrhythmia)  Outcome: Ongoing (interventions implemented as appropriate)

## 2018-09-08 NOTE — PAYOR COMM NOTE
"TO:[facility]  FROM:MARIA M EATON, RN PHONE [phone]  INPT CLINICALS/NOTIFICATION    Cristela Huber (58 y.o. Female)     Date of Birth Social Security Number Address Home Phone MRN    1960  [address] [place] [address] [address] [place] [address] [phone] [id number]    Amish Marital Status          Unknown        Admission Date Admission Type Admitting Provider Attending Provider Department, Room/Bed    9/7/18 Emergency Sara Santiago MD Majumder, Mosumi, MD [facility] MED SURG  4, 403/1    Discharge Date Discharge Disposition Discharge Destination                       Attending Provider:  Sara Santiago MD    Allergies:  No Known Allergies    Isolation:  None   Infection:  None   Code Status:  CPR    Ht:  177.8 cm (70\")   Wt:  65.9 kg (145 lb 4 oz)    Admission Cmt:  None   Principal Problem:  None                Active Insurance as of 9/7/2018     Primary Coverage     Payor Plan Insurance Group Employer/Plan Group    [employer] [place] [employer]      Payor Plan Address Payor Plan Phone Number Effective From Effective To    [address] [address] [phone] 9/16/2016     [facility] [address]       Subscriber Name Subscriber Birth Date Member ID       CRISTELA HUBER 1960 [id number]                 Emergency Contacts      (Rel.) Home Phone Work Phone Mobile Phone    Charlie Huber (Son) [phone] -- --               History & Physical      Sara Santiago MD at 9/7/2018  7:05 PM              [facility]  HISTORY AND PHYSICAL    Primary Care Physician: Provider, No Known    Subjective     Chief Complaint:    Chief Complaint   Patient presents with   • Shortness of Breath       History of Present Illness:     I have reviewed labs/imaging/records from this hospitalization, including ER staff to establish a comprehensive understanding of this patient's clinical issues, as well as to establish plan of care " appropriately.     Patient is a 58 year old female with medical history of paroxysmal atrial fibrillation on anticoagulation therapy, active nicotine abuse, hypertension and suspected COPD, who presented to the ER last night with complaints of shortness of breath and persistent cough. Patient states that she has been having a incessant cough associated with shortness of breath for the last 1 week. Cough has been mostly non-productive. She has had some wheezing but no fevers or chills. She came to the ER on 09/02 and was sent home after a dose of Decadron and was prescribed albuterol inhaler as her symptoms improved with duonebs and all her work up was essentially unremarkable. However, her symptoms persisted, so she went to her primary care doctor on Tuesday where she again received a shot of steroids and was prescribed oral antibiotics- Cefdinir. She took the antibiotics and nebulizer treatments along with multiple doses of inhalers a kathya (albuterol), without any significant relief, so she returned to the ER for evaluation. She has no other complaints of chest pain, palpitations, headache, dizziness, diarrhea, dysuria or abdominal pain. Patient is a current smoker, smokes about 1ppd and has been for many years.     On arrival to the ER last night, she was found to be tachycardic and EKG revealed A. Fib with RVR. Laboratory work up notable for potassium of 3.4, normal troponin and D-dimer. ProBNP elevated 2110. CBC unremarkable. A CXR was obtained which per the ER physician's evaluation showed a possible left lower lobe infiltrate. She was given multiple doses of Cardizem and her home sotalol dose with improvement in her heart rate. She was also given 125mg of Solumedrol, Levaquin and 40meq of potassium. Due to lack of beds are our facility, she was to be transferred to [facility] where her primary cardiologist is also located. However, patient was on the waiting list for about 13 hours and they did not have  a bed and therefore, she is being admitted to the [facility] as we now do have bed available.     Review of Systems   1. Constitutional: Negative for fever. Negative for chills, diaphoresis, fatigue and unexpected weight change.   2. HENT: Negative for congestion and hearing loss.   3. Eyes: Negative for redness and visual disturbance.   4. Respiratory: + shortness of breath. Negative for chest pain . + cough and chest tightness.   5. Cardiovascular: Negative for chest pain and palpitations.   6. Gastrointestinal: Negative for abdominal distention, abdominal pain and blood in stool.   7. Endocrine: Negative for cold intolerance and heat intolerance.   8. Genitourinary: Negative for difficulty urinating, dysuria and frequency.   9. Musculoskeletal: Negative for arthralgias, back pain and myalgias.   10. Skin: Negative for color change, rash and wound.   11. Neurological: Negative for syncope, weakness and headaches.   12. Hematological: Negative for adenopathy. Does not bruise/bleed easily.   13. Psychiatric/Behavioral: Negative for confusion. The patient is not nervous/anxious.     Past Medical History:   Past Medical History:   Diagnosis Date   • Atrial fibrillation (CMS/HCC)     CHADS-VASC = 2   • Hypertension        Past Surgical History:History reviewed. No pertinent surgical history.    Family History: family history includes Alcohol abuse in her maternal grandfather; Cancer in her maternal aunt and mother; Heart disease in her maternal grandfather.    Social History:  reports that she has been smoking Cigarettes.  She has a 22.00 pack-year smoking history. She has never used smokeless tobacco. She reports that she does not drink alcohol or use drugs.    Medications:  Prescriptions Prior to Admission   Medication Sig Dispense Refill Last Dose   • albuterol (PROVENTIL) (5 MG/ML) 0.5% nebulizer solution Take 2.5 mg by nebulization Every 6 (Six) Hours As Needed for Wheezing.      • cefdinir (OMNICEF) 300 MG capsule Take  "300 mg by mouth 2 (Two) Times a Day.      • acetaminophen (TYLENOL) 500 MG tablet Take 500 mg by mouth Every 6 (Six) Hours As Needed for mild pain (1-3).   Taking   • amLODIPine (NORVASC) 10 MG tablet TAKE ONE TABLET BY MOUTH ONCE DAILY 90 tablet 3 Taking   • sotalol (BETAPACE) 80 MG tablet TAKE ONE TABLET BY MOUTH TWICE DAILY 60 tablet 6 Taking   • XARELTO 20 MG tablet TAKE ONE TABLET BY MOUTH ONCE DAILY WITH  DINNER 30 tablet 5 Taking       Allergies:  No Known Allergies      Objective     Physical Exam:  Vital Signs: /77   Pulse 96   Temp 98 °F (36.7 °C) (Oral)   Resp 16   Ht 177.8 cm (70\")   Wt 68.8 kg (151 lb 9.6 oz)   LMP  (LMP Unknown)   SpO2 95%   BMI 21.75 kg/m²       Physical Exam:     General Appearance:   Alert, cooperative, in no acute distress.     Head:   Normocephalic, without obvious abnormality, atraumatic.     Eyes:       Normal, conjunctivae and sclerae, no icterus, no pallor, corneas clear, PERRLA        Throat:   Oral mucosa dry      Neck:  No adenopathy, supple, trachea midline, no thyromegaly, no carotid bruit, no JVD      Back:   No CVA tenderness on Percussion.     Lungs:   Unlabored breathing, fair air movement bilaterally, minimal expiratory wheezing in posterior lung fields, no rhonchi or crackles       Heart:   Irregular rhythm and normal rate, normal S1 and S2.       Abdomen:   Obese. Normal bowel sounds, no masses, no organomegaly, soft non-tender, non-distended, no guarding, no rebound tenderness        Extremities:  Moves all extremities, no edema, no cyanosis, no redness.     Pulses:  Pulses palpable and equal bilaterally but weak.     Skin:  No bleeding, bruising or rash        Neurologic:  Cranial nerves grossly intact, move all extremities         Results Review:  Lab Results (last 7 days)     Procedure Component Value Units Date/Time    Procalcitonin [[id number]]  (Normal) Collected:  09/07/18 0451    Specimen:  Blood Updated:  09/07/18 1856     Procalcitonin <0.05 " ng/mL     Narrative:       As a Marker for Sepsis (Non-Neonates):   1. <0.5 ng/mL represents a low risk of severe sepsis and/or septic shock.  2. >2 ng/mL represents a high risk of severe sepsis and/or septic shock.    As a Marker for Lower Respiratory Tract Infections that require antibiotic therapy:    PCT on Admission     Antibiotic Therapy       6-12 Hrs later  > 0.5                Strongly Recommended             >0.25 - <0.5         Recommended  0.1 - 0.25           Discouraged              Remeasure/reassess PCT  <0.1                 Strongly Discouraged     Remeasure/reassess PCT                     PCT values of < 0.5 ng/mL do not exclude an infection, because localized infections (without systemic signs) may be associated with such low concentrations, or a systemic infection in its initial stages (< 6 hours). Furthermore, increased PCT can occur without infection. PCT concentrations between 0.5 and 2.0 ng/mL should be interpreted taking into account the patient's history. It is recommended to retest PCT within 6-24 hours if any concentrations < 2 ng/mL are obtained.    Blood Culture With MIREILLE - Blood, [[id number]] Collected:  09/07/18 0716    Specimen:  Blood from Arm, Left Updated:  09/07/18 0735    Blood Culture With MIREILLE - Blood, [[id number]] Collected:  09/07/18 0724    Specimen:  Blood from Arm, Left Updated:  09/07/18 0734    [place] Draw [[id number]] Collected:  09/07/18 0451    Specimen:  Blood Updated:  09/07/18 0600    Narrative:       The following orders were created for panel order [place] Draw.  Procedure                               Abnormality         Status                     ---------                               -----------         ------                     Light Blue Top[[id number]]                                   Final result               Lavender Top[[id number]]                                     Final result               Gold Top - SST[[id number]]                                    Final result               Green Top (No Gel)[[id number]]                               Final result                 Please view results for these tests on the individual orders.    Lavender Top [[id number]] Collected:  09/07/18 0451    Specimen:  Blood Updated:  09/07/18 0600     Extra Tube hold for add-on     Comment: Auto resulted       Light Blue Top [[id number]] Collected:  09/07/18 0451    Specimen:  Blood Updated:  09/07/18 0600     Extra Tube hold for add-on     Comment: Auto resulted       Gold Top - SST [[id number]] Collected:  09/07/18 0451    Specimen:  Blood Updated:  09/07/18 0600     Extra Tube Hold for add-ons.     Comment: Auto resulted.       Green Top (No Gel) [[id number]] Collected:  09/07/18 0451    Specimen:  Blood Updated:  09/07/18 0600     Extra Tube Hold for add-ons.     Comment: Auto resulted.       D-dimer, Quantitative [[id number]]  (Normal) Collected:  09/07/18 0451    Specimen:  Blood Updated:  09/07/18 0554     D-Dimer, Quantitative 358 ng/mL (FEU)     Blood Gas, Arterial With Co-Ox [[id number]]  (Abnormal) Collected:  09/07/18 0548    Specimen:  Arterial Blood Updated:  09/07/18 0549     Site Right Brachial     Alli's Test N/A     pH, Arterial 7.525 (C) pH units      pCO2, Arterial 30.1 (L) mm Hg      pO2, Arterial 71.1 (L) mm Hg      HCO3, Arterial 24.8 mmol/L      Base Excess, Arterial 2.8 (H) mmol/L      O2 Saturation, Arterial 96.1 %      Hemoglobin, Blood Gas 14.0 g/dL      Hematocrit, Blood Gas 43.0 %      Oxyhemoglobin 94.8 %      Methemoglobin 0.70 %      Carboxyhemoglobin 0.7 %      Barometric Pressure for Blood Gas 738 mmHg      Modality Room Air     FIO2 21 %      Ventilator Mode NA     Collected by salena     pH, Temp Corrected -- pH Units      pCO2, Temperature Corrected -- mm Hg      pO2, Temperature Corrected -- mm Hg     BNP [[id number]]  (Abnormal) Collected:  09/07/18 0451    Specimen:  Blood Updated:  09/07/18 0530     proBNP 2,110.0 (C) pg/mL     Comprehensive Metabolic  Panel [[id number]]  (Abnormal) Collected:  09/07/18 0451    Specimen:  Blood Updated:  09/07/18 0527     Glucose 112 (H) mg/dL      BUN 6 (L) mg/dL      Creatinine 0.70 mg/dL      Sodium 141 mmol/L      Potassium 3.4 (L) mmol/L      Chloride 107 mmol/L      CO2 24.0 (L) mmol/L      Calcium 9.2 mg/dL      Total Protein 7.5 g/dL      Albumin 4.20 g/dL      ALT (SGPT) 14 U/L      AST (SGOT) 20 U/L      Alkaline Phosphatase 106 U/L      Total Bilirubin 0.7 mg/dL      eGFR Non African Amer 85 mL/min/1.73      Globulin 3.3 gm/dL      A/G Ratio 1.3 g/dL      BUN/Creatinine Ratio 8.6     Anion Gap 13.4 mmol/L     Troponin [[id number]]  (Normal) Collected:  09/07/18 0451    Specimen:  Blood Updated:  09/07/18 0527     Troponin I <0.012 ng/mL     CBC & Differential [[id number]] Collected:  09/07/18 0451    Specimen:  Blood Updated:  09/07/18 0510    Narrative:       The following orders were created for panel order CBC & Differential.  Procedure                               Abnormality         Status                     ---------                               -----------         ------                     CBC Auto Differential[[id number]]        Abnormal            Final result                 Please view results for these tests on the individual orders.    CBC Auto Differential [[id number]]  (Abnormal) Collected:  09/07/18 0451    Specimen:  Blood Updated:  09/07/18 0510     WBC 9.67 10*3/mm3      RBC 4.92 10*6/mm3      Hemoglobin 14.7 g/dL      Hematocrit 43.9 %      MCV 89.2 fL      MCH 29.9 pg      MCHC 33.5 g/dL      RDW 12.9 %      RDW-SD 42.5 fl      MPV 9.5 fL      Platelets 278 10*3/mm3      Neutrophil % 64.9 %      Lymphocyte % 23.7 %      Monocyte % 9.6 %      Eosinophil % 1.3 %      Basophil % 0.3 %      Immature Grans % 0.2 %      Neutrophils, Absolute 6.27 10*3/mm3      Lymphocytes, Absolute 2.29 10*3/mm3      Monocytes, Absolute 0.93 (H) 10*3/mm3      Eosinophils, Absolute 0.13 10*3/mm3      Basophils, Absolute  0.03 10*3/mm3      Immature Grans, Absolute 0.02 10*3/mm3      nRBC 0.0 /100 WBC         Imaging Results (last 72 hours)     Procedure Component Value Units Date/Time    XR Chest 1 View [[id number]] Collected:  09/07/18 0835     Updated:  09/07/18 0838    Narrative:       PROCEDURE: XR CHEST 1 VW-     HISTORY: sob     COMPARISON: September 2, 2018.     FINDINGS: The heart is normal in size. The mediastinum is unremarkable.  There are chronic interstitial lung changes. No focal opacities were  effusions. There is no pneumothorax.  There are no acute osseous  abnormalities.           Impression:       No acute cardiopulmonary process.     Continued followup is recommended.     This report was finalized on 9/7/2018 8:36 AM by Saroj Corea M.D..        I have personally reviewed and interpreted available lab data, radiology studies and ECG obtained at time of admission.     Assessment / Plan     Assessment/Problem List:   Active Problems:    Pneumonia of left lower lobe due to infectious organism (CMS/[facility])    1. A. Fib with RVR, POA  2. Acute bronchitis vs. Community acquired left lower lobe pneumonia, due to unknown organism, POA  3. Suspect acute COPD exacerbation  4. Active nicotine abuse  5. Hypertension  6. Hypokalemia, replaced    Plan:  Admit to med-surg with telemetry. I have checked a procalcitonin level which is essentially negative, will repeat another one tomorrow morning, if remains negative, consider discontinuation of antibiotics. I will continue the patient on Solumedrol 40mg q8h along with nebulized steroids. Will avoid albuterol and placed on Atrovent only. Obtain sputum culture if she's able to provide a sample. Smoking cessation encouraged. She is willing to try nicotine patch while hospitalized.   Continue home doses of Sotalol and continue on Xarelto.     Details were discussed with the patient.   Further recommendations will depend on clinical course of the patient during the current  hospitalization.    I also discussed the details with the nursing staff.  Rest as ordered.    Anticipated stay is less than 2 midnights.    Sara Santiago MD 09/07/18 7:05 PM    Dictated using Dragon.    Electronically signed by Sara Santiago MD at 9/7/2018  7:49 PM          Emergency Department Notes      Adarsh Frank MD at 9/7/2018  4:58 AM          Subjective   58-year-old female presenting with shortness of breath.  She states that for the last week she has felt increasingly short of breath.  She has had cough and wheezing.  Initially she was seen here in the ED for her symptoms and was given Decadron by mouth and an inhaler.  This was not helping so she was seen in urgent care a few days later and given Cefdinir.  She states this also isn't helping.  She denies any fevers, chills, nausea, vomiting, chest pain or other complaints.            Review of Systems   Constitutional: Negative.    HENT: Negative.    Eyes: Negative.    Respiratory: Positive for cough, shortness of breath and wheezing.    Cardiovascular: Negative.    Gastrointestinal: Negative.    Genitourinary: Negative.    Musculoskeletal: Negative.    Skin: Negative.    Neurological: Negative.    Psychiatric/Behavioral: Negative.        Past Medical History:   Diagnosis Date   • Atrial fibrillation (CMS/HCC)     CHADS-VASC = 2   • Hypertension        No Known Allergies    History reviewed. No pertinent surgical history.    Family History   Problem Relation Age of Onset   • Cancer Mother    • Cancer Maternal Aunt    • Alcohol abuse Maternal Grandfather    • Heart disease Maternal Grandfather        Social History     Social History   • Marital status:      Social History Main Topics   • Smoking status: Current Every Day Smoker     Packs/day: 1.00     Years: 22.00     Types: Cigarettes   • Smokeless tobacco: Never Used   • Alcohol use No   • Drug use: No   • Sexual activity: No     Other Topics Concern   • Not on file            Objective   Physical Exam   Constitutional: She is oriented to person, place, and time. She appears well-developed and well-nourished. No distress.   HENT:   Head: Normocephalic and atraumatic.   Right Ear: External ear normal.   Left Ear: External ear normal.   Nose: Nose normal.   Mouth/Throat: Oropharynx is clear and moist.   Eyes: Pupils are equal, round, and reactive to light. Conjunctivae and EOM are normal.   Neck: Normal range of motion. Neck supple.   Cardiovascular: Normal heart sounds and intact distal pulses.    Irregularly irregular, rapid rate   Pulmonary/Chest: Effort normal. No respiratory distress.   Diffuse expiratory wheezing   Abdominal: Soft. Bowel sounds are normal. She exhibits no distension. There is no tenderness. There is no rebound and no guarding.   Musculoskeletal: Normal range of motion. She exhibits no edema, tenderness or deformity.   Neurological: She is alert and oriented to person, place, and time.   Skin: Skin is warm and dry. No rash noted.   Psychiatric: She has a normal mood and affect. Her behavior is normal.   Nursing note and vitals reviewed.      Procedures          ED Course  ED Course as of Sep 07 1921   Fri Sep 07, 2018   1756 After spending all day in our emergency departmentwithout having a bed opens up at [facility] where she was accepted, we do have beds here now and can take care of this patient.  Patient is willing to stay.  I did discuss this with Dr. Santiago who graciously accepted the patient.  [DT]      ED Course User Index  [DT] Jules Huizar MD                  MDM  Number of Diagnoses or Management Options  Atrial fibrillation with rapid ventricular response (CMS/HCC):   Pneumonia of left lower lobe due to infectious organism (CMS/HCC):   Diagnosis management comments: 58-year-old female with shortness of breath.  Well-developed, well-nourished female in no distress with exam as above.  She no way has wheezing throughout and is in atrial  fibrillation with a rapid ventricular rate.  She does not know when this started.  We'll check labs to include a d-dimer.  We'll treat symptomatically.  Disposition and workup.    DDX: COPD, pneumonia, bronchitis, viral illness, atrial fibrillation, PE    EKG interpreted by me:  atrial fibrillation, rapid ventricular rate, inferior/lateral ST depression, this is an abnormal EKG, compared to previous atrial fibrillation has replaced a sinus rhythm    6:50 AM Chest x-ray per my interpretation reveals left lower lobe infiltrate which is new from 6 days ago.  Labwork notable for elevated BNP.  She feels no better after treatment, still wheezing.  She remains in atrial fibrillation.  We have no beds here, patient's cardiologist is in [place] at [facility].  Given this paged hospitalist at [facility] for transfer.  I have given her Levaquin since she was already on cefdinir. Awaiting call back.    7:22 AM Discussed with Dr Desai, accepted in transfer.       Amount and/or Complexity of Data Reviewed  Clinical lab tests: reviewed  Tests in the radiology section of CPT®:  reviewed  Decide to obtain previous medical records or to obtain history from someone other than the patient: yes    Critical Care  Total time providing critical care: 30-74 minutes        Final diagnoses:   Pneumonia of left lower lobe due to infectious organism (CMS/HCC)   Atrial fibrillation with rapid ventricular response (CMS/HCC)            Adarsh Frank MD  09/07/18 0723       Adarsh Frank MD  09/07/18 1921      Electronically signed by Adarsh Frank MD at 9/7/2018  7:21 PM     Estela Molina at 9/7/2018  6:56 AM        [facility] paging Dr. Hudson, [facility] hospitalist, per Dr. Frank @ [phone]     Estela Molina  09/07/18 0657      Electronically signed by Estela Molina at 9/7/2018  6:57 AM     Haritha Orellana at 9/7/2018  7:20 AM        [facility] called back, call transferred to Dr. Frank.      Haritha Orellana  09/07/18  0721      Electronically signed by Haritha Orellana at 9/7/2018  7:21 AM     Milana Bella RN at 9/7/2018  8:55 AM        PT given warm blanket and cardiac diet ordered.  Waiting on bed at [facility].     Milana Bella RN  09/07/18 0855      Electronically signed by Milana Bella RN at 9/7/2018  8:55 AM     Ilene Espitia at 9/7/2018 10:33 AM        [facility] called back to inform us that patient is still on waiting list for bed.      Ilene Espitia  09/07/18 1324      Electronically signed by Ilene Espitia at 9/7/2018  1:24 PM     Milana Bella RN at 9/7/2018 10:39 AM        MD notified of patient in Afib with RVR with HR of 130's and pt she has not had her Sotalol this am.        Milana Bella RN  09/07/18 1041      Electronically signed by Milana Bella RN at 9/7/2018 10:41 AM     Milana Bella RN at 9/7/2018 11:28 AM        PT watching TV with daughter.  Lunch      Milana Bella RN  09/07/18 1139      Electronically signed by Milana Bella RN at 9/7/2018 11:39 AM     Haritha Orellana at 9/7/2018  1:45 PM        Called [facility] back to check on bed status. Currently still awaiting on a bed.     Haritha Orellana  09/07/18 1715      Electronically signed by Haritha Orellana at 9/7/2018  5:15 PM     Milana Bella RN at 9/7/2018  4:09 PM        PT given Sprite.  Declined any other needs at this time.      Milana Bella RN  09/07/18 1611      Electronically signed by Milana Bella RN at 9/7/2018  4:11 PM     Haritha Orellana at 9/7/2018  5:19 PM        Called [facility] to check on bed status. No beds available at this time.      Haritha Orellana  09/07/18 1720      Electronically signed by Haritha Orellana at 9/7/2018  5:20 PM     Milana Bella, RN at 9/7/2018  5:50 PM        PT stated that she was getting restless and has been here for almost 14 hours.  Says she would be agreeable to stay here if they had any beds.  HEMAL Alicea RN spoke with Dr. Huizar and will call hospitalist.      Shayne,  Milana DRIVER RN  09/07/18 1808      Electronically signed by Milana Bella RN at 9/7/2018  6:08 PM     Milana Bella RN at 9/7/2018  6:00 PM        Dinner tray given to patient.  Dr. Santiago accepted patient and will be admitted here.  Waiting on bed assignment.     Milana Bella RN  09/07/18 1809      Electronically signed by Milana Bella RN at 9/7/2018  6:09 PM     Milana Bella RN at 9/7/2018  6:33 PM        Report to PAXTON Judd.      Milana Bella RN  09/07/18 1834      Electronically signed by Milana Bella RN at 9/7/2018  6:34 PM     Milana Bella RN at 9/7/2018  6:49 PM        PT taken via wheelchair to room 403 via PCT.     Milana Bella RN  09/07/18 1850      Electronically signed by Milana Bella RN at 9/7/2018  6:50 PM             ICU Vital Signs     Row Name 09/08/18 0739 09/08/18 0726 09/08/18 0507 09/08/18 0400 09/08/18 0326       Height and Weight    Weight  --  -- 65.9 kg (145 lb 4 oz)  --  --    Weight Method  --  -- Bed scale  --  --       Vitals    Temp 98.2 °F (36.8 °C)  --  --  -- 98.3 °F (36.8 °C)    Temp src Oral  --  --  -- Oral    Pulse 119 (!)  124  --  -- 97    Heart Rate Source Monitor Monitor  --  -- Monitor    Resp 20 22  --  -- 18    Resp Rate Source Visual Visual  --  -- Visual    /94  --  --  -- 124/86    BP Location Left arm  --  --  -- Left arm    BP Method Automatic  --  --  -- Automatic    Patient Position Lying  --  --  -- Lying       Oxygen Therapy    SpO2 94 % 93 %  --  -- 93 %    Pulse Oximetry Type  -- Continuous  --  -- Continuous    Device (Oxygen Therapy) room air room air  -- room air room air    Row Name 09/08/18 0007 09/08/18 0000 09/07/18 2139 09/07/18 2020 09/07/18 1937       Vitals    Temp 98.5 °F (36.9 °C)  -- 97.8 °F (36.6 °C)  --  --    Temp src Oral  -- Oral  --  --    Pulse 112  -- 90  -- 106    Heart Rate Source Monitor  -- Monitor  -- Monitor    Resp 18  -- 18  -- 18    Resp Rate Source Visual  -- Visual  -- Visual    BP  140/89  -- 127/82  --  --    BP Location Left arm  -- Left arm  --  --    BP Method Automatic  -- Automatic  --  --    Patient Position Lying  -- Lying  --  --       Oxygen Therapy    SpO2 93 %  -- 97 %  -- 96 %    Pulse Oximetry Type Continuous  -- Intermittent  --  --    Device (Oxygen Therapy) room air room air room air room air room air    Row Name 09/07/18 1919 09/07/18 1831 09/07/18 1702 09/07/18 1302 09/07/18 1232       Vitals    Temp 98 °F (36.7 °C)  --  --  --  --    Temp src Oral  --  --  --  --    Pulse 99 96 86 92 108    Heart Rate Source Monitor  --  --  --  --    Resp 16  --  --  --  --    Resp Rate Source Visual  --  --  --  --    /94  -- 111/77 118/90 128/93    Noninvasive MAP (mmHg)  --  -- 86 97 98    BP Location Right arm  --  --  --  --    BP Method Automatic  --  --  --  --    Patient Position Sitting  --  --  --  --       Oxygen Therapy    SpO2 98 % 95 % 96 % 97 % 95 %    Device (Oxygen Therapy) room air  --  --  --  --    Row Name 09/07/18 1148 09/07/18 1132 09/07/18 1111 09/07/18 1102 09/07/18 1002       Vitals    Pulse 93 90 91 86 94    BP  -- 130/89  -- 121/84 119/85    Noninvasive MAP (mmHg)  -- 113  -- 97 94       Oxygen Therapy    SpO2 96 % 96 % 95 % 95 % 96 %        Hospital Medications (active)       Dose Frequency Start End    acetaminophen (TYLENOL) tablet 650 mg 650 mg Every 4 Hours PRN 9/7/2018     Sig - Route: Take 2 tablets by mouth Every 4 (Four) Hours As Needed for Mild Pain . - Oral    amLODIPine (NORVASC) tablet 10 mg 10 mg Daily 9/7/2018     Sig - Route: Take 2 tablets by mouth Daily. - Oral    AZITHROMYCIN 500 MG/250 ML 0.9% NS IVPB (vial-mate) 500 mg Every 24 Hours 9/7/2018 9/10/2018    Sig - Route: Infuse 500 mg into a venous catheter Daily. - Intravenous    budesonide (PULMICORT) nebulizer solution 0.25 mg 0.25 mg 2 Times Daily - RT 9/7/2018     Sig - Route: Take 2 mL by nebulization 2 (Two) Times a Day. - Nebulization    cefTRIAXone (ROCEPHIN) 1 g/100 mL 0.9% NS  "VTB (mbp) 1 g Every 24 Hours 9/7/2018 9/10/2018    Sig - Route: Infuse 100 mL into a venous catheter Daily. - Intravenous    diltiaZEM (CARDIZEM) injection 20 mg 20 mg Once 9/7/2018 9/7/2018    Sig - Route: Infuse 4 mL into a venous catheter 1 (One) Time. - Intravenous    ipratropium (ATROVENT) nebulizer solution 0.5 mg 0.5 mg 4 Times Daily - RT 9/7/2018     Sig - Route: Take 2.5 mL by nebulization 4 (Four) Times a Day. - Nebulization    LORazepam (ATIVAN) tablet 1 mg 1 mg Every 6 Hours PRN 9/7/2018 9/17/2018    Sig - Route: Take 2 tablets by mouth Every 6 (Six) Hours As Needed for Anxiety. - Oral    methylPREDNISolone sodium succinate (SOLU-Medrol) injection 40 mg 40 mg Every 8 Hours 9/7/2018     Sig - Route: Infuse 1 mL into a venous catheter Every 8 (Eight) Hours. - Intravenous    nicotine (NICODERM CQ) 21 MG/24HR patch 1 patch 1 patch Every 24 Hours 9/7/2018     Sig - Route: Place 1 patch on the skin as directed by provider Daily. - Transdermal    ondansetron (ZOFRAN) injection 4 mg 4 mg Every 6 Hours PRN 9/7/2018     Sig - Route: Infuse 2 mL into a venous catheter Every 6 (Six) Hours As Needed for Nausea or Vomiting. - Intravenous    Linked Group 1:  \"Or\" Linked Group Details        ondansetron (ZOFRAN) tablet 4 mg 4 mg Every 6 Hours PRN 9/7/2018     Sig - Route: Take 1 tablet by mouth Every 6 (Six) Hours As Needed for Nausea or Vomiting. - Oral    Linked Group 1:  \"Or\" Linked Group Details        ondansetron ODT (ZOFRAN-ODT) disintegrating tablet 4 mg 4 mg Every 6 Hours PRN 9/7/2018     Sig - Route: Take 1 tablet by mouth Every 6 (Six) Hours As Needed for Nausea or Vomiting. - Oral    Linked Group 1:  \"Or\" Linked Group Details        rivaroxaban (XARELTO) tablet 20 mg 20 mg Daily With Dinner 9/7/2018     Sig - Route: Take 2 tablets by mouth Daily With Dinner. - Oral    sodium chloride 0.9 % flush 1-10 mL 1-10 mL As Needed 9/7/2018     Sig - Route: Infuse 1-10 mL into a venous catheter As Needed for Line Care. " - Intravenous    sodium chloride 0.9 % flush 10 mL 10 mL As Needed 9/7/2018     Sig - Route: Infuse 10 mL into a venous catheter As Needed for Line Care. - Intravenous    Cosign for Ordering: Accepted by Adarsh Frank MD on 9/7/2018  7:24 AM    sodium chloride 0.9 % infusion 75 mL/hr Continuous 9/7/2018     Sig - Route: Infuse 75 mL/hr into a venous catheter Continuous. - Intravenous    sotalol (BETAPACE) tablet 80 mg 80 mg Once 9/7/2018 9/7/2018    Sig - Route: Take 1 tablet by mouth 1 (One) Time. - Oral    sotalol (BETAPACE) tablet 80 mg 80 mg 2 Times Daily 9/7/2018     Sig - Route: Take 1 tablet by mouth 2 (Two) Times a Day. - Oral            Lab Results (last 24 hours)     Procedure Component Value Units Date/Time    Blood Culture With MIREILLE - Blood, [[id number]]  (Normal) Collected:  09/07/18 0716    Specimen:  Blood from Arm, Left Updated:  09/08/18 0745     Blood Culture No growth at 24 hours    Blood Culture With MIREILLE - Blood, [[id number]]  (Normal) Collected:  09/07/18 0724    Specimen:  Blood from Arm, Left Updated:  09/08/18 0745     Blood Culture No growth at 24 hours    Procalcitonin [[id number]]  (Normal) Collected:  09/08/18 0514    Specimen:  Blood Updated:  09/08/18 0727     Procalcitonin <0.05 ng/mL     Narrative:       As a Marker for Sepsis (Non-Neonates):   1. <0.5 ng/mL represents a low risk of severe sepsis and/or septic shock.  2. >2 ng/mL represents a high risk of severe sepsis and/or septic shock.    As a Marker for Lower Respiratory Tract Infections that require antibiotic therapy:    PCT on Admission     Antibiotic Therapy       6-12 Hrs later  > 0.5                Strongly Recommended             >0.25 - <0.5         Recommended  0.1 - 0.25           Discouraged              Remeasure/reassess PCT  <0.1                 Strongly Discouraged     Remeasure/reassess PCT                     PCT values of < 0.5 ng/mL do not exclude an infection, because localized infections (without  systemic signs) may be associated with such low concentrations, or a systemic infection in its initial stages (< 6 hours). Furthermore, increased PCT can occur without infection. PCT concentrations between 0.5 and 2.0 ng/mL should be interpreted taking into account the patient's history. It is recommended to retest PCT within 6-24 hours if any concentrations < 2 ng/mL are obtained.    Basic Metabolic Panel [[id number]]  (Abnormal) Collected:  09/08/18 0514    Specimen:  Blood Updated:  09/08/18 0700     Glucose 133 (H) mg/dL      BUN 10 mg/dL      Creatinine 0.60 mg/dL      Sodium 140 mmol/L      Potassium 3.9 mmol/L      Chloride 108 (H) mmol/L      CO2 24.0 (L) mmol/L      Calcium 9.2 mg/dL      eGFR Non African Amer 102 mL/min/1.73      BUN/Creatinine Ratio 16.7     Anion Gap 11.9 mmol/L     Narrative:       GFR Normal >60  Chronic Kidney Disease <60  Kidney Failure <15    CBC Auto Differential [[id number]]  (Abnormal) Collected:  09/08/18 0514    Specimen:  Blood Updated:  09/08/18 0646     WBC 8.66 10*3/mm3      RBC 4.28 10*6/mm3      Hemoglobin 12.9 g/dL      Hematocrit 38.2 %      MCV 89.3 fL      MCH 30.1 pg      MCHC 33.8 g/dL      RDW 13.0 %      RDW-SD 42.5 fl      MPV 10.0 fL      Platelets 248 10*3/mm3      Neutrophil % 87.1 (H) %      Lymphocyte % 10.0 %      Monocyte % 2.4 %      Eosinophil % 0.0 %      Basophil % 0.0 %      Immature Grans % 0.5 %      Neutrophils, Absolute 7.54 (H) 10*3/mm3      Lymphocytes, Absolute 0.87 10*3/mm3      Monocytes, Absolute 0.21 10*3/mm3      Eosinophils, Absolute 0.00 10*3/mm3      Basophils, Absolute 0.00 10*3/mm3      Immature Grans, Absolute 0.04 10*3/mm3      nRBC 0.0 /100 WBC     Procalcitonin [[id number]]  (Normal) Collected:  09/07/18 0451    Specimen:  Blood Updated:  09/07/18 1854     Procalcitonin <0.05 ng/mL     Narrative:       As a Marker for Sepsis (Non-Neonates):   1. <0.5 ng/mL represents a low risk of severe sepsis and/or septic shock.  2. >2 ng/mL  represents a high risk of severe sepsis and/or septic shock.    As a Marker for Lower Respiratory Tract Infections that require antibiotic therapy:    PCT on Admission     Antibiotic Therapy       6-12 Hrs later  > 0.5                Strongly Recommended             >0.25 - <0.5         Recommended  0.1 - 0.25           Discouraged              Remeasure/reassess PCT  <0.1                 Strongly Discouraged     Remeasure/reassess PCT                     PCT values of < 0.5 ng/mL do not exclude an infection, because localized infections (without systemic signs) may be associated with such low concentrations, or a systemic infection in its initial stages (< 6 hours). Furthermore, increased PCT can occur without infection. PCT concentrations between 0.5 and 2.0 ng/mL should be interpreted taking into account the patient's history. It is recommended to retest PCT within 6-24 hours if any concentrations < 2 ng/mL are obtained.        Imaging Results (last 24 hours)     ** No results found for the last 24 hours. **        Physician Progress Notes (last 24 hours) (Notes from 9/7/2018  9:57 AM through 9/8/2018  9:57 AM)     No notes of this type exist for this encounter.        Consult Notes (last 24 hours) (Notes from 9/7/2018  9:57 AM through 9/8/2018  9:57 AM)     No notes of this type exist for this encounter.

## 2018-09-08 NOTE — PROGRESS NOTES
[facility] [place]    Name: Cristela Stratton, 58 y.o. female  MRN: [id number], : 1960   Date of Admission: 2018   Date of Service: 18   PCP: Provider, No Known     Hospital Course:   Ms. Stratton is a 59 yo F with h/o paroxysmal atrial fibrillation on AC with Xarelto, current tobacco use, HTN, suspected COPD who was admitted on 18 for 1 week of cough and shortness of breath refractory to outpatient therapies (steroid, albuterol inhaler and antibiotic cefdinir which she took for 3 days prior to admission). Vitals on admission notable for  and /122. Labs on admission notable for: procalcitonin normal, troponin negative x 1, BNP 2100, K 3.4, CO2 24, AB.52/30/71/24 on RA, BCx x 2 obtained. She was admitted for suspected COPD in acute exacerbation. Started on steroids, nebulizer treatments and rocephin/azithromycin for suspected pneumonia vs bronchitis.     Consultants: none   Procedures: none    Antibiotics:   Rocephin d1  Azithromycin d1     Micro:    BCx ngtd   BCx ngtd    -----------------------------------------------------------------------------------------------------------------------------------------------------------------------------------------------------------------------------------------------------  Subjective   Chief Complaint: f/u dyspnea     Subjective:   Patient seen and examined this afternoon.  Tele reviewed: afib. Events from last night noted. Discussed with PAXTON Carson. Patient feeling much better this afternoon but has remained in bed and sitting in chair for the most part. She reports wheezing intermittently.    Review of Systems:   Gen-no fevers, no chills  CV-no chest pain, no palpitations  Resp-no cough, no dyspnea  GI-no N/V/D, no abd pain    Objective   Objective:     Intake/Output Summary (Last 24 hours) at 18 1853  Last data filed at 18 1722   Gross per 24 hour   Intake          2447.75 ml   Output              1350 ml   Net          1097.75 ml      SpO2: 98 %     Physical Examination:   Vitals:    09/08/18 0726 09/08/18 0739 09/08/18 1217 09/08/18 1722   BP:  134/94 122/78 132/71   BP Location:  Left arm Left arm Left arm   Patient Position:  Lying Lying Lying   Pulse: (!) 124 119 113 118   Resp: 22 20 20 20   Temp:  98.2 °F (36.8 °C)  98.3 °F (36.8 °C)   TempSrc:  Oral  Oral   SpO2: 93% 94% 97% 98%   Weight:       Height:          General:  Elderly female, no acute distress  Heart:   IR IR, S1 S2 normal, no m/r/g  Lungs:   CTAB, no wheezes  Abdomen:  soft, NT, ND, +BS  Extremities: no edema/cyanosis/clubbing  Neuro:  A&Ox3, no focal deficits  Psych:  appropriate mood  Skin:  No bleeding, bruising, or rash    Pertinent laboratory and radiology data were reviewed:  Microbiology Results (last 10 days)     Procedure Component Value - Date/Time    Blood Culture With MIREILLE - Blood, [[id number]]  (Normal) Collected:  09/07/18 0724    Lab Status:  Preliminary result Specimen:  Blood from Arm, Left Updated:  09/08/18 0745     Blood Culture No growth at 24 hours    Blood Culture With MIREILLE - Blood, [[id number]]  (Normal) Collected:  09/07/18 0716    Lab Status:  Preliminary result Specimen:  Blood from Arm, Left Updated:  09/08/18 0745     Blood Culture No growth at 24 hours          Medications Reviewed:     amLODIPine 10 mg Oral Daily   budesonide 0.25 mg Nebulization BID - RT   ipratropium 0.5 mg Nebulization 4x Daily - RT   methylPREDNISolone sodium succinate 40 mg Intravenous Q8H   nicotine 1 patch Transdermal Q24H   rivaroxaban 20 mg Oral Daily With Dinner   sotalol 80 mg Oral BID        Assessment /Plan   Assessment/Plan:   1. Atrial fibrillation with rapid ventricular response, POA. Rate overall controlled, flucuates between 100s-110s. Continue sotalol. AC with Xarelto. Suspect rapid rate was due to overuse of rescue inhaler. Check TSH. Monitor on tele.  2. Progressive dyspnea and cough. Suspected COPD with acute  exacerbation. Continue IV steroids, decrease to q12h. Continue pulmicort and atrovent due to #1. Anticipate will need symbicort and tiotroprium on discharge as well as steroid taper.  3. Current tobacco use. nicotine patch while here, hope to continue at home  4. Hypertension. Initially uncontrolled diastolic. Likely to stress from #2. Improved     FEN: cardiac diet  DVT Prophylaxis: xarelto  PUD Prophylaxis: pepcid    Reason for continued hospitalization: COPD exacerbation  Disposition: possible discharge tomorrow  Discussed with: patient and RN Yamileth Ny MD   6:53 PM on 9/8/2018

## 2018-09-09 ENCOUNTER — NURSE TRIAGE (OUTPATIENT)
Dept: CALL CENTER | Facility: HOSPITAL | Age: 58
End: 2018-09-09

## 2018-09-09 ENCOUNTER — APPOINTMENT (OUTPATIENT)
Dept: ULTRASOUND IMAGING | Facility: HOSPITAL | Age: 58
End: 2018-09-09

## 2018-09-09 VITALS
TEMPERATURE: 97.5 F | SYSTOLIC BLOOD PRESSURE: 114 MMHG | DIASTOLIC BLOOD PRESSURE: 75 MMHG | RESPIRATION RATE: 20 BRPM | OXYGEN SATURATION: 93 % | HEART RATE: 93 BPM | HEIGHT: 70 IN | WEIGHT: 149.06 LBS | BODY MASS INDEX: 21.34 KG/M2

## 2018-09-09 PROBLEM — J18.9 PNEUMONIA OF LEFT LOWER LOBE DUE TO INFECTIOUS ORGANISM: Status: RESOLVED | Noted: 2018-09-07 | Resolved: 2018-09-09

## 2018-09-09 PROBLEM — J44.9 COPD (CHRONIC OBSTRUCTIVE PULMONARY DISEASE): Status: ACTIVE | Noted: 2018-09-09

## 2018-09-09 PROBLEM — E05.90 SUBCLINICAL HYPERTHYROIDISM: Status: ACTIVE | Noted: 2018-09-09

## 2018-09-09 LAB
ANION GAP SERPL CALCULATED.3IONS-SCNC: 11.9 MMOL/L (ref 10–20)
BASOPHILS # BLD AUTO: 0.01 10*3/MM3 (ref 0–0.2)
BASOPHILS NFR BLD AUTO: 0.1 % (ref 0–2.5)
BUN BLD-MCNC: 14 MG/DL (ref 7–20)
BUN/CREAT SERPL: 20 (ref 7.1–23.5)
CALCIUM SPEC-SCNC: 9.1 MG/DL (ref 8.4–10.2)
CHLORIDE SERPL-SCNC: 107 MMOL/L (ref 98–107)
CO2 SERPL-SCNC: 24 MMOL/L (ref 26–30)
CREAT BLD-MCNC: 0.7 MG/DL (ref 0.6–1.3)
DEPRECATED RDW RBC AUTO: 42.3 FL (ref 37–54)
EOSINOPHIL # BLD AUTO: 0 10*3/MM3 (ref 0–0.7)
EOSINOPHIL NFR BLD AUTO: 0 % (ref 0–7)
ERYTHROCYTE [DISTWIDTH] IN BLOOD BY AUTOMATED COUNT: 13.1 % (ref 11.5–14.5)
GFR SERPL CREATININE-BSD FRML MDRD: 85 ML/MIN/1.73
GLUCOSE BLD-MCNC: 116 MG/DL (ref 74–98)
HCT VFR BLD AUTO: 37.2 % (ref 37–47)
HGB BLD-MCNC: 12.5 G/DL (ref 12–16)
IMM GRANULOCYTES # BLD: 0.11 10*3/MM3 (ref 0–0.06)
IMM GRANULOCYTES NFR BLD: 1.3 % (ref 0–0.6)
LYMPHOCYTES # BLD AUTO: 1.14 10*3/MM3 (ref 0.6–3.4)
LYMPHOCYTES NFR BLD AUTO: 13 % (ref 10–50)
MCH RBC QN AUTO: 29.8 PG (ref 27–31)
MCHC RBC AUTO-ENTMCNC: 33.6 G/DL (ref 30–37)
MCV RBC AUTO: 88.8 FL (ref 81–99)
MONOCYTES # BLD AUTO: 0.26 10*3/MM3 (ref 0–0.9)
MONOCYTES NFR BLD AUTO: 3 % (ref 0–12)
NEUTROPHILS # BLD AUTO: 7.28 10*3/MM3 (ref 2–6.9)
NEUTROPHILS NFR BLD AUTO: 82.6 % (ref 37–80)
NRBC BLD MANUAL-RTO: 0 /100 WBC (ref 0–0)
PLATELET # BLD AUTO: 270 10*3/MM3 (ref 130–400)
PMV BLD AUTO: 10.1 FL (ref 6–12)
POTASSIUM BLD-SCNC: 3.9 MMOL/L (ref 3.5–5.1)
RBC # BLD AUTO: 4.19 10*6/MM3 (ref 4.2–5.4)
SODIUM BLD-SCNC: 139 MMOL/L (ref 137–145)
T4 FREE SERPL-MCNC: 1.12 NG/DL (ref 0.78–2.19)
TSH SERPL DL<=0.05 MIU/L-ACNC: 0.22 MIU/ML (ref 0.47–4.68)
WBC NRBC COR # BLD: 8.8 10*3/MM3 (ref 4.8–10.8)

## 2018-09-09 PROCEDURE — 94799 UNLISTED PULMONARY SVC/PX: CPT

## 2018-09-09 PROCEDURE — 80048 BASIC METABOLIC PNL TOTAL CA: CPT | Performed by: HOSPITALIST

## 2018-09-09 PROCEDURE — 85025 COMPLETE CBC W/AUTO DIFF WBC: CPT | Performed by: HOSPITALIST

## 2018-09-09 PROCEDURE — 25010000002 METHYLPREDNISOLONE PER 40 MG: Performed by: HOSPITALIST

## 2018-09-09 PROCEDURE — 84443 ASSAY THYROID STIM HORMONE: CPT | Performed by: HOSPITALIST

## 2018-09-09 PROCEDURE — 99239 HOSP IP/OBS DSCHRG MGMT >30: CPT | Performed by: HOSPITALIST

## 2018-09-09 PROCEDURE — 76536 US EXAM OF HEAD AND NECK: CPT

## 2018-09-09 PROCEDURE — 84439 ASSAY OF FREE THYROXINE: CPT | Performed by: HOSPITALIST

## 2018-09-09 RX ORDER — BUDESONIDE AND FORMOTEROL FUMARATE DIHYDRATE 80; 4.5 UG/1; UG/1
2 AEROSOL RESPIRATORY (INHALATION)
Qty: 1 INHALER | Refills: 0 | OUTPATIENT
Start: 2018-09-09 | End: 2018-11-18

## 2018-09-09 RX ORDER — GUAIFENESIN AND DEXTROMETHORPHAN HYDROBROMIDE 600; 30 MG/1; MG/1
1 TABLET, EXTENDED RELEASE ORAL 2 TIMES DAILY PRN
Qty: 10 TABLET | Refills: 0 | Status: SHIPPED | OUTPATIENT
Start: 2018-09-09 | End: 2018-10-03

## 2018-09-09 RX ORDER — PREDNISONE 20 MG/1
TABLET ORAL
Qty: 13 TABLET | Refills: 0 | Status: SHIPPED | OUTPATIENT
Start: 2018-09-09 | End: 2018-10-03

## 2018-09-09 RX ORDER — BENZONATATE 100 MG/1
100 CAPSULE ORAL 3 TIMES DAILY PRN
Qty: 10 CAPSULE | Refills: 0 | Status: SHIPPED | OUTPATIENT
Start: 2018-09-09 | End: 2018-10-03

## 2018-09-09 RX ORDER — BENZONATATE 100 MG/1
100 CAPSULE ORAL 3 TIMES DAILY PRN
Status: DISCONTINUED | OUTPATIENT
Start: 2018-09-09 | End: 2018-09-09 | Stop reason: HOSPADM

## 2018-09-09 RX ORDER — GUAIFENESIN AND DEXTROMETHORPHAN HYDROBROMIDE 600; 30 MG/1; MG/1
1 TABLET, EXTENDED RELEASE ORAL 2 TIMES DAILY PRN
Status: DISCONTINUED | OUTPATIENT
Start: 2018-09-09 | End: 2018-09-09 | Stop reason: HOSPADM

## 2018-09-09 RX ORDER — NICOTINE 21 MG/24HR
1 PATCH, TRANSDERMAL 24 HOURS TRANSDERMAL EVERY 24 HOURS
Qty: 10 PATCH | Refills: 0 | Status: SHIPPED | OUTPATIENT
Start: 2018-09-09 | End: 2018-09-14

## 2018-09-09 RX ADMIN — IPRATROPIUM BROMIDE 0.5 MG: 0.5 SOLUTION RESPIRATORY (INHALATION) at 06:39

## 2018-09-09 RX ADMIN — METHYLPREDNISOLONE SODIUM SUCCINATE 40 MG: 40 INJECTION, POWDER, FOR SOLUTION INTRAMUSCULAR; INTRAVENOUS at 00:03

## 2018-09-09 RX ADMIN — SOTALOL HYDROCHLORIDE 80 MG: 80 TABLET ORAL at 08:47

## 2018-09-09 RX ADMIN — AMLODIPINE BESYLATE 10 MG: 5 TABLET ORAL at 08:47

## 2018-09-09 RX ADMIN — METHYLPREDNISOLONE SODIUM SUCCINATE 40 MG: 40 INJECTION, POWDER, FOR SOLUTION INTRAMUSCULAR; INTRAVENOUS at 11:46

## 2018-09-09 RX ADMIN — SODIUM CHLORIDE 75 ML/HR: 9 INJECTION, SOLUTION INTRAVENOUS at 04:16

## 2018-09-09 RX ADMIN — IPRATROPIUM BROMIDE 0.5 MG: 0.5 SOLUTION RESPIRATORY (INHALATION) at 12:09

## 2018-09-09 NOTE — PROGRESS NOTES
Discharge Planning Assessment  [facility]     Patient Name: Cristela Stratton  MRN: [id number]  Today's Date: 9/9/2018    Admit Date: 9/7/2018          Discharge Needs Assessment     Row Name 09/09/18 0954       Living Environment    Lives With spouse;other relative(s)    Current Living Arrangements home/apartment/condo    Primary Care Provided by self    Provides Primary Care For grandchild(cara)    Family Caregiver if Needed spouse    Able to Return to Prior Arrangements yes       Resource/Environmental Concerns    Resource/Environmental Concerns none    Transportation Concerns car, none       Transition Planning    Patient/Family Anticipates Transition to home    Patient/Family Anticipated Services at Transition none    Transportation Anticipated family or friend will provide       Discharge Needs Assessment    Concerns to be Addressed no discharge needs identified    Equipment Currently Used at Home none    Anticipated Changes Related to Illness none    Equipment Needed After Discharge none            Discharge Plan     Row Name 09/09/18 5277       Plan    Plan Spoke with patient and verified current address and phone number  Patient has no POA but lives with  and 2 grandchildren  C>J> Viral (son) #[phone]  Haritha Stratton (daughter) #[phone]  No PCP so provided her with a list of  physicians  Patient does not have any DME or ever used  services.  Patient stated she plans on going home upon discharge         Destination     No service coordination in this encounter.      Durable Medical Equipment     No service coordination in this encounter.      Dialysis/Infusion     No service coordination in this encounter.      Home Medical Care     No service coordination in this encounter.      Social Care     No service coordination in this encounter.                Demographic Summary     Row Name 09/09/18 5341       General Information    Admission Type observation    Arrived From emergency  department    Referral Source admission list    Reason for Consult discharge planning    Preferred Language English       Contact Information    Permission Granted to Share Info With     Contact Information Obtained for             Functional Status     Row Name 09/09/18 0953       Functional Status    Usual Activity Tolerance excellent    Current Activity Tolerance excellent       Functional Status, IADL    Medications independent    Meal Preparation independent    Housekeeping independent    Laundry independent    Shopping independent       Mental Status    General Appearance WDL WDL       Mental Status Summary    Recent Changes in Mental Status/Cognitive Functioning no changes            Psychosocial    No documentation.           Abuse/Neglect    No documentation.           Legal    No documentation.           Substance Abuse    No documentation.           Patient Forms    No documentation.         Richelle Ring

## 2018-09-09 NOTE — TELEPHONE ENCOUNTER
"Caller was released from [facility] today and 2 of her prescriptions are not at the pharmacy.  Message sent to [facility] case management with contact information.    Reason for Disposition  • [1] Prescription not at pharmacy AND [2] was prescribed today by PCP    Additional Information  • Negative: Drug overdose and nurse unable to answer question  • Negative: Caller requesting information not related to medicine  • Negative: Caller requesting a prescription for Strep throat and has a positive culture result  • Negative: Rash while taking a medication or within 3 days of stopping it  • Negative: Immunization reaction suspected  • Negative: [1] Asthma and [2] having symptoms of asthma (cough, wheezing, etc)  • Negative: MORE THAN A DOUBLE DOSE of a prescription or over-the-counter (OTC) drug  • Negative: [1] DOUBLE DOSE (an extra dose or lesser amount) of over-the-counter (OTC) drug AND [2] any symptoms (e.g., dizziness, nausea, pain, sleepiness)  • Negative: [1] DOUBLE DOSE (an extra dose or lesser amount) of prescription drug AND [2] any symptoms (e.g., dizziness, nausea, pain, sleepiness)  • Negative: Took another person's prescription drug  • Negative: [1] DOUBLE DOSE (an extra dose or lesser amount) of prescription drug AND [2] NO symptoms (Exception: a double dose of antibiotics)  • Negative: Diabetes drug error or overdose (e.g., insulin or extra dose)  • Negative: [1] Request for URGENT new prescription or refill of \"essential\" medication (i.e., likelihood of harm to patient if not taken) AND [2] triager unable to fill per unit policy    Answer Assessment - Initial Assessment Questions  1. SYMPTOMS: \"Do you have any symptoms?\"      no  2. SEVERITY: If symptoms are present, ask \"Are they mild, moderate or severe?\"      no    Protocols used: MEDICATION QUESTION CALL-ADULT-      "

## 2018-09-09 NOTE — DISCHARGE SUMMARY
"[facility] - Hospitalist - Patient Discharge Summary    Patient Name: Cristela Stratton YOB: 1960 MRN: [id number]   Admit Date: 2018   Discharge Date: 2018   Date of Service: 2018   Admitting Physician: Sara Santiago MD   Attending Physician: Heavenly Ny MD   Primary Care Physician: Provider, No Known     Admitting Diagnosis:   Pneumonia of left lower lobe due to infectious organism (CMS/HCC) [J18.1]  Pneumonia of left lower lobe due to infectious organism (CMS/HCC) [J18.1]     Discharge Diagnosis:   Active Problems:    COPD (chronic obstructive pulmonary disease) (CMS/HCC)    Subclinical hyperthyroidism      Consults:   Consults     No orders found from 2018 to 2018.          Procedures/Interventions/Operations:   18 US Thyroid: \"Subcentimeter bilateral thyroid nodules. These are nonspecific. Consider 1 year follow-up.\"    Hospital Course:   Ms. Stratton is a 59 yo F with h/o paroxysmal atrial fibrillation on AC with Xarelto, current tobacco use, HTN, suspected COPD who was admitted on 18 for 1 week of cough and shortness of breath refractory to outpatient therapies (steroid, albuterol inhaler and antibiotic cefdinir which she took for 3 days prior to admission). Vitals on admission notable for  and /122. Labs on admission notable for: procalcitonin normal, troponin negative x 1, BNP 2100, K 3.4, CO2 24, AB.52/30/71/24 on RA, BCx x 2 obtained. She was admitted for suspected COPD in acute exacerbation. Started on steroids, nebulizer treatments and rocephin/azithromycin for suspected pneumonia vs bronchitis with improvement in symptoms. Repeat CXR on day after admission again without acute cardiopulmonary process and procalcitonin remained negative, so antibiotics were not continued. She was started on tessalon perrles in addition for cough. Thyroid function testing done given her history of atrial fibrillation. TSH was low and free T4 " was normal, consistent with subclinical hypothyroidism. US thyroid was also checked and she was noted to have bilateral subcentimeter nodules, repeat ultrasound in 1 year was recommended by radiologist. Patient will be discharged home with prescriptions for steroid taper and inhalers. She was strongly encouraged to call tomorrow (Monday) morning to establish care with PCP as she currently does not have one.     Discharge Follow Up Recommendations for labs/diagnostics:  repeat thyroid ultrasound in 1 year, Recommend repeat thyroid function testing in 6-8 weeks, Re: subclinical hypothyroidism: low TSH (0.219) and normal fT4    Discharge Examination:   General Appearance:  Alert and cooperative, not in any acute distress.   Head:  Atraumatic and normocephalic, without obvious abnormality.   Eyes:          PERRL, conjunctivae and sclerae normal, no Icterus. No pallor. Extra-occular movements are within normal limits.   Ears:  Ears appear intact with no abnormalities noted.   Throat: No oral lesions, no thrush, oral mucosa moist.   Neck: Supple, trachea midline   Lungs:   Chest shape is normal. Breath sounds heard bilaterally equally.  No crackles or wheezing. Prolonged exp phase. No Pleural rub or bronchial breathing.   Heart:  Irregularly irregular, no murmur, no gallop, no rub.   Abdomen:   Normal bowel sounds, no masses. Soft, non-tender, non-distended, no guarding, no rebound tenderness.   Extremities: Moves all extremities well, no edema, no cyanosis, no clubbing.   Pulses: Pulses palpable and equal bilaterally.   Skin: No bleeding, bruising or rash.   Lymph nodes: No palpable adenopathy.   Neurologic: Alert and oriented x 3. Moves all four limbs equally. No tremors. No facial asymetry.     Discharge Condition:   good     Discharge Disposition:   Home or Self Care    Code status during the hospital stay:  Code Status and Medical Interventions:   Ordered at: 09/07/18 9475     Level Of Support Discussed With:     Patient     Code Status:    CPR     Medical Interventions (Level of Support Prior to Arrest):    Full       Discharge Instructions:   Diet:  regular/cardiac  Activity: as tolerated  Patient education performed: discussed new medications, follow-up  Pending labs and studies:    Order Current Status    Blood Culture With MIREILLE - Blood, Preliminary result    Blood Culture With MIREILLE - Blood, Preliminary result        Physician instructions:   1. follow-up with PCP in 2 weeks, patient to call on Monday (tomorrow) to make an appointment  2. please seek prompt medical attention if chest pain, increasing shortness of breath, uncontrolled fevers/chills or any other worrisome concerns  3. Do not drive, swim, operate heavy equipment or perform any activity that may endanger yourself or others while taking pain medication   Followup Appointments:   Follow-up Information     Provider, No Known. Call in 1 day(s).    Contact information:  [facility] [id number]                    Anticipated Problems: none     Discharge Medications:      Discharge Medications      New Medications      Instructions Start Date   benzonatate 100 MG capsule  Commonly known as:  TESSALON   100 mg, Oral, 3 Times Daily PRN      budesonide-formoterol 80-4.5 MCG/ACT inhaler  Commonly known as:  SYMBICORT   2 puffs, Inhalation, 2 Times Daily - RT      guaifenesin-dextromethorphan  MG tablet sustained-release 12 hour tablet   1 tablet, Oral, 2 Times Daily PRN      ipratropium 17 MCG/ACT inhaler  Commonly known as:  ATROVENT HFA   2 puffs, Inhalation, 4 Times Daily - RT      nicotine 21 MG/24HR patch  Commonly known as:  NICODERM CQ   1 patch, Transdermal, Every 24 Hours      predniSONE 20 MG tablet  Commonly known as:  DELTASONE   prednisone 60mg PO daily x 2 days then 40mg PO daily x 2 days then 20mg PO daily x 2 days then 10mg PO daily x 2 days then stop         Continue These Medications      Instructions Start Date   acetaminophen  500 MG tablet  Commonly known as:  TYLENOL   500 mg, Oral, Every 6 Hours PRN      albuterol (5 MG/ML) 0.5% nebulizer solution  Commonly known as:  PROVENTIL   2.5 mg, Nebulization, Every 6 Hours PRN      amLODIPine 10 MG tablet  Commonly known as:  NORVASC   TAKE ONE TABLET BY MOUTH ONCE DAILY      cefdinir 300 MG capsule  Commonly known as:  OMNICEF   300 mg, Oral, 2 Times Daily      sotalol 80 MG tablet  Commonly known as:  BETAPACE   TAKE ONE TABLET BY MOUTH TWICE DAILY      XARELTO 20 MG tablet  Generic drug:  rivaroxaban   TAKE ONE TABLET BY MOUTH ONCE DAILY WITH  DINNER              Pertinent Data/Imaging:   Lab Results   Component Value Date    WBC 8.80 09/09/2018    HGB 12.5 09/09/2018    HCT 37.2 09/09/2018    MCV 88.8 09/09/2018     09/09/2018      Lab Results   Component Value Date    CREATININE 0.70 09/09/2018    BUN 14 09/09/2018     09/09/2018    K 3.9 09/09/2018     09/09/2018    CO2 24.0 (L) 09/09/2018        Time Spent on Discharge: (345pm-420pm) totaling 35 min which included; examination and discussion with patient, primary nurse PAXTON Musa. Review of medical record, review of medications, printing of scripts, ensuring adequate followup, along with documentation.     Heavenly Ny MD   4:29 PM on 9/9/2018

## 2018-09-09 NOTE — PLAN OF CARE
Problem: Patient Care Overview  Goal: Plan of Care Review  Outcome: Ongoing (interventions implemented as appropriate)   09/09/18 0321   Coping/Psychosocial   Plan of Care Reviewed With patient   Plan of Care Review   Progress improving   OTHER   Outcome Summary VSS, NO C/O SOA OR PAIN,        Problem: Pneumonia (Adult)  Goal: Signs and Symptoms of Listed Potential Problems Will be Absent, Minimized or Managed (Pneumonia)  Outcome: Ongoing (interventions implemented as appropriate)      Problem: Arrhythmia/Dysrhythmia (Symptomatic) (Adult)  Goal: Signs and Symptoms of Listed Potential Problems Will be Absent, Minimized or Managed (Arrhythmia/Dysrhythmia)  Outcome: Ongoing (interventions implemented as appropriate)

## 2018-09-09 NOTE — PLAN OF CARE
Problem: Patient Care Overview  Goal: Plan of Care Review  Outcome: Ongoing (interventions implemented as appropriate)    Goal: Individualization and Mutuality  Outcome: Ongoing (interventions implemented as appropriate)    Goal: Discharge Needs Assessment  Outcome: Ongoing (interventions implemented as appropriate)    Goal: Interprofessional Rounds/Family Conf  Outcome: Ongoing (interventions implemented as appropriate)      Problem: Pneumonia (Adult)  Goal: Signs and Symptoms of Listed Potential Problems Will be Absent, Minimized or Managed (Pneumonia)  Outcome: Ongoing (interventions implemented as appropriate)      Problem: Arrhythmia/Dysrhythmia (Symptomatic) (Adult)  Goal: Signs and Symptoms of Listed Potential Problems Will be Absent, Minimized or Managed (Arrhythmia/Dysrhythmia)  Outcome: Ongoing (interventions implemented as appropriate)

## 2018-09-10 ENCOUNTER — READMISSION MANAGEMENT (OUTPATIENT)
Dept: CALL CENTER | Facility: HOSPITAL | Age: 58
End: 2018-09-10

## 2018-09-10 NOTE — OUTREACH NOTE
Prep Survey      Responses   Facility patient discharged from?  [facility]   Is patient eligible?  Yes   Discharge diagnosis  PNA, COPD   Does the patient have one of the following disease processes/diagnoses(primary or secondary)?  COPD/Pneumonia   Does the patient have Home health ordered?  No   Is there a DME ordered?  No   Comments regarding appointments  Doesn't have a PCP   Prep survey completed?  Yes          Lisa Mcnamara RN

## 2018-09-10 NOTE — PROGRESS NOTES
Case Management Discharge Note    Final Note: home    Destination     No service has been selected for the patient.      Durable Medical Equipment     No service has been selected for the patient.      Dialysis/Infusion     No service has been selected for the patient.      Home Medical Care     No service has been selected for the patient.      Social Care     No service has been selected for the patient.        Other: Other    Final Discharge Disposition Code: 01 - home or self-care

## 2018-09-11 ENCOUNTER — OFFICE VISIT (OUTPATIENT)
Dept: INTERNAL MEDICINE | Facility: CLINIC | Age: 58
End: 2018-09-11

## 2018-09-11 VITALS
OXYGEN SATURATION: 96 % | WEIGHT: 157 LBS | HEIGHT: 70 IN | TEMPERATURE: 97.7 F | SYSTOLIC BLOOD PRESSURE: 142 MMHG | DIASTOLIC BLOOD PRESSURE: 94 MMHG | HEART RATE: 118 BPM | BODY MASS INDEX: 22.48 KG/M2

## 2018-09-11 DIAGNOSIS — J44.9 CHRONIC OBSTRUCTIVE PULMONARY DISEASE, UNSPECIFIED COPD TYPE (HCC): ICD-10-CM

## 2018-09-11 DIAGNOSIS — I10 ESSENTIAL HYPERTENSION: ICD-10-CM

## 2018-09-11 DIAGNOSIS — I48.0 PAROXYSMAL ATRIAL FIBRILLATION (HCC): Primary | ICD-10-CM

## 2018-09-11 DIAGNOSIS — E05.90 SUBCLINICAL HYPERTHYROIDISM: ICD-10-CM

## 2018-09-11 DIAGNOSIS — Z72.0 TOBACCO ABUSE: ICD-10-CM

## 2018-09-11 DIAGNOSIS — D23.30 CYST, DERMOID, FACE: ICD-10-CM

## 2018-09-11 PROCEDURE — 99496 TRANSJ CARE MGMT HIGH F2F 7D: CPT | Performed by: INTERNAL MEDICINE

## 2018-09-11 RX ORDER — NICOTINE 21 MG/24HR
1 PATCH, TRANSDERMAL 24 HOURS TRANSDERMAL EVERY 24 HOURS
Qty: 28 EACH | Refills: 0 | Status: SHIPPED | OUTPATIENT
Start: 2018-09-11 | End: 2018-09-14

## 2018-09-11 NOTE — PROGRESS NOTES
Chief Complaint   Patient presents with   • Establish Care   • Atrial Fibrillation   • Pneumonia   • Decreased Visual Acuity       Subjective     History of Present Illness   Cristela Stratton is a 58 y.o. female presenting for follow up after hospitalization.  Hospital records were reviewed and discussed with the patient.    Hospitalization Summary:  Patient was admitted on 9/7/18 and discharged on 9/9/18 at [facility] for left lower lobe pneumonia and COPD exacerbation.  Patient was treated with Rocephin and then azithromycin with steroids and had gradual improvement in symptoms.  She did have significant tachycardia during her hospitalization which is common for her given her history of A. fib.  It appears that this was exacerbated while being given inhalers and steroids.  Since discharge, patient has no new complaints or concerns.  She does note that her heart rate has been on the fast side especially with her inhalers.  She is gaining her strength back but does request to have [facility] paperwork completed.  Patient works at [employer].    She also has a left-sided facial cyst below her ear which she would like to have removed by dermatology.  She also realizes that she needs to quit smoking.  She has been able to hold off of cigarettes for the last 6 days with nicotine patch.      The following portions of the patient's history were reviewed and updated as appropriate: allergies, current medications, past family history, past medical history, past social history, past surgical history and problem list.    Review of Systems   Constitutional: Negative for chills, fatigue and fever.   HENT: Negative for congestion, ear pain, rhinorrhea, sinus pressure and sore throat.    Eyes: Negative for visual disturbance.   Respiratory: Negative for cough, chest tightness, shortness of breath and wheezing.    Cardiovascular: Negative for chest pain, palpitations and leg swelling.   Gastrointestinal: Negative for  abdominal pain, blood in stool, constipation, diarrhea, nausea and vomiting.   Endocrine: Negative for polydipsia and polyuria.   Genitourinary: Negative for dysuria and hematuria.   Musculoskeletal: Negative for back pain.   Skin: Negative for rash.        Facial cyst   Neurological: Negative for dizziness, light-headedness, numbness and headaches.   Psychiatric/Behavioral: Negative for dysphoric mood and sleep disturbance. The patient is not nervous/anxious.        No Known Allergies    Past Medical History:   Diagnosis Date   • Atrial fibrillation (CMS/HCC)     CHADS-VASC = 2   • Hypertension        Social History     Social History   • Marital status:      Spouse name: N/A   • Number of children: N/A   • Years of education: N/A     Occupational History   • Not on file.     Social History Main Topics   • Smoking status: Current Every Day Smoker     Packs/day: 1.00     Years: 22.00     Types: Cigarettes   • Smokeless tobacco: Never Used   • Alcohol use No   • Drug use: No   • Sexual activity: No     Other Topics Concern   • Not on file     Social History Narrative   • No narrative on file       No past surgical history on file.    Family History   Problem Relation Age of Onset   • Cancer Mother    • Cancer Maternal Aunt    • Alcohol abuse Maternal Grandfather    • Heart disease Maternal Grandfather          Current Outpatient Prescriptions:   •  acetaminophen (TYLENOL) 500 MG tablet, Take 500 mg by mouth Every 6 (Six) Hours As Needed for mild pain (1-3)., Disp: , Rfl:   •  albuterol (PROVENTIL) (5 MG/ML) 0.5% nebulizer solution, Take 2.5 mg by nebulization Every 6 (Six) Hours As Needed for Wheezing., Disp: , Rfl:   •  amLODIPine (NORVASC) 10 MG tablet, TAKE ONE TABLET BY MOUTH ONCE DAILY, Disp: 90 tablet, Rfl: 3  •  benzonatate (TESSALON) 100 MG capsule, Take 1 capsule by mouth 3 (Three) Times a Day As Needed for Cough., Disp: 10 capsule, Rfl: 0  •  budesonide-formoterol (SYMBICORT) 80-4.5 MCG/ACT inhaler,  "Inhale 2 puffs 2 (Two) Times a Day., Disp: 1 inhaler, Rfl: 0  •  guaifenesin-dextromethorphan (MUCINEX DM)  MG tablet sustained-release 12 hour tablet, Take 1 tablet by mouth 2 (Two) Times a Day As Needed (cough)., Disp: 10 tablet, Rfl: 0  •  ipratropium (ATROVENT HFA) 17 MCG/ACT inhaler, Inhale 2 puffs 4 (Four) Times a Day., Disp: 1 inhaler, Rfl: 0  •  nicotine (NICODERM CQ) 21 MG/24HR patch, Place 1 patch on the skin as directed by provider Daily., Disp: 10 patch, Rfl: 0  •  predniSONE (DELTASONE) 20 MG tablet, prednisone 60mg PO daily x 2 days then 40mg PO daily x 2 days then 20mg PO daily x 2 days then 10mg PO daily x 2 days then stop, Disp: 13 tablet, Rfl: 0  •  sotalol (BETAPACE) 80 MG tablet, TAKE ONE TABLET BY MOUTH TWICE DAILY, Disp: 60 tablet, Rfl: 6  •  XARELTO 20 MG tablet, TAKE ONE TABLET BY MOUTH ONCE DAILY WITH  DINNER, Disp: 30 tablet, Rfl: 5  •  nicotine (NICOTINE STEP 1) 21 MG/24HR patch, Place 1 patch on the skin as directed by provider Daily., Disp: 28 each, Rfl: 0    Objective   /94 (BP Location: Left arm, Patient Position: Sitting)   Pulse 118   Temp 97.7 °F (36.5 °C)   Ht 177.8 cm (70\")   Wt 71.2 kg (157 lb)   LMP  (LMP Unknown)   SpO2 96%   BMI 22.53 kg/m²     Physical Exam   Constitutional: She is oriented to person, place, and time. She appears well-nourished. No distress.   HENT:   Head: Atraumatic.   Right Ear: External ear normal.   Left Ear: External ear normal.   Eyes: Conjunctivae are normal. Right eye exhibits no discharge. Left eye exhibits no discharge.   Neck: Normal range of motion. Neck supple.   Cardiovascular: Normal rate and regular rhythm.    No murmur heard.  Pulmonary/Chest: Effort normal and breath sounds normal. She has no wheezes. She has no rales.   Abdominal: Soft. Bowel sounds are normal. She exhibits no distension. There is no tenderness.   Neurological: She is alert and oriented to person, place, and time.   Skin: No rash noted. She is not " diaphoretic.   Psychiatric: She has a normal mood and affect.   Nursing note and vitals reviewed.      Assessment/Plan   Cristela was seen today for establish care, atrial fibrillation, pneumonia and decreased visual acuity.    Diagnoses and all orders for this visit:    Paroxysmal atrial fibrillation (CMS/HCC)    Essential hypertension    Chronic obstructive pulmonary disease, unspecified COPD type (CMS/HCC)    Tobacco abuse  -     nicotine (NICOTINE STEP 1) 21 MG/24HR patch; Place 1 patch on the skin as directed by provider Daily.    Subclinical hyperthyroidism    Cyst, dermoid, face  -     Ambulatory Referral to Dermatology          Discussion Summary:    58-year-old white female presenting to establish care following hospitalization for left lower lobe pneumonia and COPD exacerbation.  Respiratory function has improved.  She is having some side effects associated with steroids and ipratropium.  She was advised to try holding ipratropium and decreasing her steroid taper faster to see if this helps control her symptoms.  Should her tachycardia persist through Thursday or Friday, patient may need to be reevaluated.  Respiratory function has improved significantly.    Patient may return to work on Monday of next week.  Paperwork will be completed    Annual visit to be reviewed and discussed at next appointment.  TSH and T4 will need to be checked for subclinical hyperthyroidism at that time.      Transitional Care Management Certification  I certify that the following are true:  1. Communication was made within 2 business days of discharge.  2. Complexity of Medical Decision Making is high.  3. Face to face visit occurred within 3 days.    *Note: 28351 is for high complexity patients with a face to face visit within 7 days of discharge.  49236 is for high complexity patients with a face to face on days 8-14 post discharge or medium complexity with face to face visit within 14 days post discharge.    Follow up:  No  Follow-up on file.     Patient Instructions:  Patient instructions were provided.

## 2018-09-11 NOTE — PROGRESS NOTES
Chief Complaint   Patient presents with   • Establish Care   • Atrial Fibrillation   • Pneumonia   • Decreased Visual Acuity       Subjective     History of Present Illness   Cristela Stratton is a 58 y.o. female presenting for annual physical.  Preventive health maintenance was reviewed and discussed today.     The following portions of the patient's history were reviewed and updated as appropriate: allergies, current medications, past family history, past medical history, past social history, past surgical history and problem list.    Review of Systems    No Known Allergies    Past Medical History:   Diagnosis Date   • Atrial fibrillation (CMS/HCC)     CHADS-VASC = 2   • Hypertension        Social History     Social History   • Marital status:      Spouse name: N/A   • Number of children: N/A   • Years of education: N/A     Occupational History   • Not on file.     Social History Main Topics   • Smoking status: Current Every Day Smoker     Packs/day: 1.00     Years: 22.00     Types: Cigarettes   • Smokeless tobacco: Never Used   • Alcohol use No   • Drug use: No   • Sexual activity: No     Other Topics Concern   • Not on file     Social History Narrative   • No narrative on file        No past surgical history on file.    Family History   Problem Relation Age of Onset   • Cancer Mother    • Cancer Maternal Aunt    • Alcohol abuse Maternal Grandfather    • Heart disease Maternal Grandfather          Current Outpatient Prescriptions:   •  acetaminophen (TYLENOL) 500 MG tablet, Take 500 mg by mouth Every 6 (Six) Hours As Needed for mild pain (1-3)., Disp: , Rfl:   •  albuterol (PROVENTIL) (5 MG/ML) 0.5% nebulizer solution, Take 2.5 mg by nebulization Every 6 (Six) Hours As Needed for Wheezing., Disp: , Rfl:   •  amLODIPine (NORVASC) 10 MG tablet, TAKE ONE TABLET BY MOUTH ONCE DAILY, Disp: 90 tablet, Rfl: 3  •  benzonatate (TESSALON) 100 MG capsule, Take 1 capsule by mouth 3 (Three) Times a Day As Needed for  "Cough., Disp: 10 capsule, Rfl: 0  •  budesonide-formoterol (SYMBICORT) 80-4.5 MCG/ACT inhaler, Inhale 2 puffs 2 (Two) Times a Day., Disp: 1 inhaler, Rfl: 0  •  cefdinir (OMNICEF) 300 MG capsule, Take 300 mg by mouth 2 (Two) Times a Day., Disp: , Rfl:   •  guaifenesin-dextromethorphan (MUCINEX DM)  MG tablet sustained-release 12 hour tablet, Take 1 tablet by mouth 2 (Two) Times a Day As Needed (cough)., Disp: 10 tablet, Rfl: 0  •  ipratropium (ATROVENT HFA) 17 MCG/ACT inhaler, Inhale 2 puffs 4 (Four) Times a Day., Disp: 1 inhaler, Rfl: 0  •  nicotine (NICODERM CQ) 21 MG/24HR patch, Place 1 patch on the skin as directed by provider Daily., Disp: 10 patch, Rfl: 0  •  predniSONE (DELTASONE) 20 MG tablet, prednisone 60mg PO daily x 2 days then 40mg PO daily x 2 days then 20mg PO daily x 2 days then 10mg PO daily x 2 days then stop, Disp: 13 tablet, Rfl: 0  •  sotalol (BETAPACE) 80 MG tablet, TAKE ONE TABLET BY MOUTH TWICE DAILY, Disp: 60 tablet, Rfl: 6  •  XARELTO 20 MG tablet, TAKE ONE TABLET BY MOUTH ONCE DAILY WITH  DINNER, Disp: 30 tablet, Rfl: 5    Objective   /94 (BP Location: Left arm, Patient Position: Sitting)   Pulse 118   Temp 97.7 °F (36.5 °C)   Ht 177.8 cm (70\")   Wt 71.2 kg (157 lb)   LMP  (LMP Unknown)   SpO2 96%   BMI 22.53 kg/m²     Physical Exam    Assessment/Plan   There are no diagnoses linked to this encounter.      Discussion Summary:    1. Preventive Health Maintenance  - Baseline labs ordered per above.  - Vaccines reviewed and updated  - Preventive health measures were discussed including: healthy diet with increase in fruits and vegetables, regular exercise at least 3 times a week, safe sex practices, avoidance of drugs, tobacco, and alcohol, and regular seatbelt use.    2.       Follow up:  No Follow-up on file.     Patient Instructions:  Patient instructions were provided.  "

## 2018-09-12 ENCOUNTER — TELEPHONE (OUTPATIENT)
Dept: CARDIOLOGY | Facility: CLINIC | Age: 58
End: 2018-09-12

## 2018-09-12 LAB
BACTERIA SPEC AEROBE CULT: NORMAL
BACTERIA SPEC AEROBE CULT: NORMAL

## 2018-09-12 NOTE — TELEPHONE ENCOUNTER
The patient called stating that she was recently discharged from inpatient admission for pneumonia and has been in atrial fibrillation since.  I advised that as her appt is nearly a year away we can get her appt moved up with EP, as that is who she sees regarding this.  EP RN aware, and is working on moving up her appt.  Patient is amenable to this plan and verbalized understanding at this time.

## 2018-09-13 ENCOUNTER — TELEPHONE (OUTPATIENT)
Dept: INTERNAL MEDICINE | Facility: CLINIC | Age: 58
End: 2018-09-13

## 2018-09-14 ENCOUNTER — READMISSION MANAGEMENT (OUTPATIENT)
Dept: CALL CENTER | Facility: HOSPITAL | Age: 58
End: 2018-09-14

## 2018-09-14 ENCOUNTER — OFFICE VISIT (OUTPATIENT)
Dept: INTERNAL MEDICINE | Facility: CLINIC | Age: 58
End: 2018-09-14

## 2018-09-14 VITALS
OXYGEN SATURATION: 100 % | DIASTOLIC BLOOD PRESSURE: 108 MMHG | BODY MASS INDEX: 23.19 KG/M2 | TEMPERATURE: 97.4 F | HEART RATE: 97 BPM | SYSTOLIC BLOOD PRESSURE: 148 MMHG | WEIGHT: 162 LBS | HEIGHT: 70 IN

## 2018-09-14 DIAGNOSIS — J44.9 CHRONIC OBSTRUCTIVE PULMONARY DISEASE, UNSPECIFIED COPD TYPE (HCC): ICD-10-CM

## 2018-09-14 DIAGNOSIS — Z72.0 TOBACCO ABUSE: ICD-10-CM

## 2018-09-14 DIAGNOSIS — I48.0 PAROXYSMAL ATRIAL FIBRILLATION (HCC): Primary | ICD-10-CM

## 2018-09-14 PROCEDURE — 93000 ELECTROCARDIOGRAM COMPLETE: CPT | Performed by: INTERNAL MEDICINE

## 2018-09-14 PROCEDURE — 99214 OFFICE O/P EST MOD 30 MIN: CPT | Performed by: INTERNAL MEDICINE

## 2018-09-14 RX ORDER — NICOTINE 21 MG/24HR
1 PATCH, TRANSDERMAL 24 HOURS TRANSDERMAL EVERY 24 HOURS
Qty: 28 EACH | Refills: 0 | Status: CANCELLED | OUTPATIENT
Start: 2018-09-14

## 2018-09-14 RX ORDER — NICOTINE 21 MG/24HR
1 PATCH, TRANSDERMAL 24 HOURS TRANSDERMAL EVERY 24 HOURS
Qty: 21 EACH | Refills: 2 | Status: SHIPPED | OUTPATIENT
Start: 2018-09-14 | End: 2018-11-24 | Stop reason: SDUPTHER

## 2018-09-14 NOTE — OUTREACH NOTE
COPD/PN Week 1 Survey      Responses   Facility patient discharged from?  [facility]   Does the patient have one of the following disease processes/diagnoses(primary or secondary)?  COPD/Pneumonia   Is there a successful TCM telephone encounter documented?  No   Was the primary reason for admission:  Pneumonia   Week 1 attempt successful?  Yes   Call start time  1421   Call end time  1425   Discharge diagnosis  PNA, COPD   Meds reviewed with patient/caregiver?  Yes   Is the patient having any side effects they believe may be caused by any medication additions or changes?  Yes   Side effects comments   taken off steriods today   Does the patient have all medications ordered at discharge?  Yes   Is the patient taking all medications as directed (includes completed medication regime)?  Yes   Does the patient have a primary care provider?   Yes   Has the patient kept scheduled appointments due by today?  Yes   Has home health visited the patient within 72 hours of discharge?  N/A   Psychosocial issues?  No   Did the patient receive a copy of their discharge instructions?  Yes   Nursing interventions  Reviewed instructions with patient   What is the patient's perception of their health status since discharge?  Improving   Are the patient's immunizations up to date?   No   Nursing interventions  Educated on importance of maintaining up to date immunizations as advised by provider   If the patient is a current smoker, are they able to teach back resources for cessation?  Smoking cessation medications, Smoking cessation support groups, Smoking cessation classes, [id number]   Is the patient/caregiver able to teach back the hierarchy of who to call/visit for symptoms/problems? PCP, Specialist, Home health nurse, Urgent Care, ED, 911  Yes   Additional teach back comments  patient is using nicotine patches and has not smoked for 8 days.   Is the patient/caregiver able to teach back signs and symptoms of worsening condition:   Fever/chills, Shortness of breath, Chest pain   Is the patient/caregiver able to teach back importance of completing antibiotic course of treatment?  Yes   Week 1 call completed?  Yes          Jaleesa Brock RN

## 2018-09-14 NOTE — PROGRESS NOTES
Chief Complaint   Patient presents with   • Atrial Fibrillation   • Hypertension   • Insomnia       Subjective     History of Present Illness   Cristela Stratton is a 58 y.o. female presenting for follow-up on medical problems.  Patient shares that she continues to feel swollen, fatigue, and week.  She has reduced her dose of steroids however she notices increased fluid retention and appetite.  Her blood pressure also remains high along with increased tachycardia.  She also notices intermittent blurry vision.  Symptoms may be associated with her use of nicotine patch.  She has not been using ipratropium.  He requests additional time off of work as she is not yet feeling well.  She also does not sleep well, likely a result of her steroid use.    The following portions of the patient's history were reviewed and updated as appropriate: allergies, current medications, past family history, past medical history, past social history, past surgical history and problem list.    Review of Systems   Constitutional: Negative for chills, fatigue and fever.   HENT: Negative for congestion, ear pain, rhinorrhea, sinus pressure and sore throat.    Eyes: Negative for visual disturbance.   Respiratory: Negative for cough, chest tightness, shortness of breath and wheezing.    Cardiovascular: Negative for chest pain, palpitations and leg swelling.   Gastrointestinal: Negative for abdominal pain, blood in stool, constipation, diarrhea, nausea and vomiting.   Endocrine: Negative for polydipsia and polyuria.   Genitourinary: Negative for dysuria and hematuria.   Musculoskeletal: Negative for back pain.   Skin: Negative for rash.   Neurological: Negative for dizziness, light-headedness, numbness and headaches.   Psychiatric/Behavioral: Negative for dysphoric mood and sleep disturbance. The patient is not nervous/anxious.        No Known Allergies    Past Medical History:   Diagnosis Date   • Atrial fibrillation (CMS/[facility])     CHADS-VASC =  2   • Hypertension        Social History     Social History   • Marital status:      Spouse name: N/A   • Number of children: N/A   • Years of education: N/A     Occupational History   • Not on file.     Social History Main Topics   • Smoking status: Current Every Day Smoker     Packs/day: 1.00     Years: 22.00     Types: Cigarettes   • Smokeless tobacco: Never Used   • Alcohol use No   • Drug use: No   • Sexual activity: No     Other Topics Concern   • Not on file     Social History Narrative   • No narrative on file        No past surgical history on file.    Family History   Problem Relation Age of Onset   • Cancer Mother    • Cancer Maternal Aunt    • Alcohol abuse Maternal Grandfather    • Heart disease Maternal Grandfather          Current Outpatient Prescriptions:   •  acetaminophen (TYLENOL) 500 MG tablet, Take 500 mg by mouth Every 6 (Six) Hours As Needed for mild pain (1-3)., Disp: , Rfl:   •  albuterol (PROVENTIL) (5 MG/ML) 0.5% nebulizer solution, Take 2.5 mg by nebulization Every 6 (Six) Hours As Needed for Wheezing., Disp: , Rfl:   •  amLODIPine (NORVASC) 10 MG tablet, TAKE ONE TABLET BY MOUTH ONCE DAILY, Disp: 90 tablet, Rfl: 3  •  benzonatate (TESSALON) 100 MG capsule, Take 1 capsule by mouth 3 (Three) Times a Day As Needed for Cough., Disp: 10 capsule, Rfl: 0  •  budesonide-formoterol (SYMBICORT) 80-4.5 MCG/ACT inhaler, Inhale 2 puffs 2 (Two) Times a Day., Disp: 1 inhaler, Rfl: 0  •  guaifenesin-dextromethorphan (MUCINEX DM)  MG tablet sustained-release 12 hour tablet, Take 1 tablet by mouth 2 (Two) Times a Day As Needed (cough)., Disp: 10 tablet, Rfl: 0  •  ipratropium (ATROVENT HFA) 17 MCG/ACT inhaler, Inhale 2 puffs 4 (Four) Times a Day., Disp: 1 inhaler, Rfl: 0  •  predniSONE (DELTASONE) 20 MG tablet, prednisone 60mg PO daily x 2 days then 40mg PO daily x 2 days then 20mg PO daily x 2 days then 10mg PO daily x 2 days then stop, Disp: 13 tablet, Rfl: 0  •  sotalol (BETAPACE) 80 MG  "tablet, TAKE ONE TABLET BY MOUTH TWICE DAILY, Disp: 60 tablet, Rfl: 6  •  XARELTO 20 MG tablet, TAKE ONE TABLET BY MOUTH ONCE DAILY WITH  DINNER, Disp: 30 tablet, Rfl: 5  •  nicotine (NICODERM CQ) 14 MG/24HR patch, Place 1 patch on the skin as directed by provider Daily., Disp: 21 each, Rfl: 2    Objective   BP (!) 148/108 (BP Location: Left arm, Patient Position: Sitting)   Pulse 97   Temp 97.4 °F (36.3 °C)   Ht 177.8 cm (70\")   Wt 73.5 kg (162 lb)   LMP  (LMP Unknown)   SpO2 100%   BMI 23.24 kg/m²     Physical Exam   Constitutional: She is oriented to person, place, and time. She appears well-developed and well-nourished.   HENT:   Head: Normocephalic and atraumatic.   Eyes: Conjunctivae are normal.   Pulmonary/Chest: Effort normal.   Musculoskeletal: Normal range of motion.   Neurological: She is alert and oriented to person, place, and time.   Psychiatric: She has a normal mood and affect. Her behavior is normal.   Nursing note and vitals reviewed.      ECG 12 Lead  Date/Time: 9/14/2018 5:50 PM  Performed by: DAYNA RODRIGUEZ  Authorized by: DAYNA RODRIGUEZ   Comparison: compared with previous ECG from 9/7/2018  Comparison to previous ECG: No change.  Rhythm: atrial fibrillation  Rate: normal  Conduction: conduction normal  ST Segments: ST segments normal  T Waves: T waves normal  QRS axis: normal  Other: no other findings  Clinical impression: abnormal ECG  Comments: Stable afib with slightly elevated rate.              Assessment/Plan   Cristela was seen today for atrial fibrillation, hypertension and insomnia.    Diagnoses and all orders for this visit:    Paroxysmal atrial fibrillation (CMS/HCC)    Tobacco abuse  -     nicotine (NICODERM CQ) 14 MG/24HR patch; Place 1 patch on the skin as directed by provider Daily.    Chronic obstructive pulmonary disease, unspecified COPD type (CMS/HCC)    Other orders  -     Cancel: nicotine (NICOTINE STEP 1) 21 MG/24HR patch; Place 1 patch on the skin as " directed by provider Daily.          Discussion Summary:    58-year-old white female recently treated in the hospital for pneumonia and A. fib with RVR.  Patient has some weight gain associated with IV fluids and prednisone use.  Patient was advised to stop prednisone.  Her breathing has improved significantly and further use of and is on is likely to cause further fluid retention.  Some of her symptoms may also be related to nicotine toxicity.  Nicotine patch was reduced to 14 mg dose.  I expect that her symptoms should improve as the steroids, out of her system.  She was reassured but if her symptoms persist, was advised to return to the clinic next week.    For the swelling in her legs, compression socks and elevation were advised.    Follow up:  No Follow-up on file.     Patient Instructions:  Patient instructions were provided.

## 2018-09-17 ENCOUNTER — TELEPHONE (OUTPATIENT)
Dept: INTERNAL MEDICINE | Facility: CLINIC | Age: 58
End: 2018-09-17

## 2018-09-17 NOTE — TELEPHONE ENCOUNTER
Latonya,  I received a  message stating that she was seen by Amber and that she was advised to call if she still had fluid, pt states that she is no better

## 2018-09-20 RX ORDER — RIVAROXABAN 20 MG/1
TABLET, FILM COATED ORAL
Qty: 90 TABLET | Refills: 1 | Status: SHIPPED | OUTPATIENT
Start: 2018-09-20 | End: 2019-03-25 | Stop reason: SDUPTHER

## 2018-09-25 ENCOUNTER — READMISSION MANAGEMENT (OUTPATIENT)
Dept: CALL CENTER | Facility: HOSPITAL | Age: 58
End: 2018-09-25

## 2018-09-25 NOTE — OUTREACH NOTE
COPD/PN Week 2 Survey      Responses   Facility patient discharged from?  [facility]   Does the patient have one of the following disease processes/diagnoses(primary or secondary)?  COPD/Pneumonia   Was the primary reason for admission:  Pneumonia   Week 2 attempt successful?  Yes   Call start time  0939   Call end time  0943   Discharge diagnosis  PNA, COPD   Is patient permission given to speak with other caregiver?  Yes   List who call center can speak with  Charlie Strattonarmida   Person spoke with today (if not patient) and relationship  Charlie Titaarmida morales   Meds reviewed with patient/caregiver?  Yes   Is the patient having any side effects they believe may be caused by any medication additions or changes?  No   Does the patient have all medications ordered at discharge?  Yes   Is the patient taking all medications as directed (includes completed medication regime)?  Yes   Does the patient have a primary care provider?   Yes   Does the patient have an appointment with their PCP or pulmonologist within 7 days of discharge?  Yes   Comments regarding PCP  Jluis Licea DO, appt 9/14/18   Has the patient kept scheduled appointments due by today?  Yes   Has home health visited the patient within 72 hours of discharge?  N/A   Psychosocial issues?  No   Did the patient receive a copy of their discharge instructions?  Yes   Nursing interventions  Reviewed instructions with patient [Reviewed with son]   What is the patient's perception of their health status since discharge?  Improving   Is the patient/caregiver able to teach back the hierarchy of who to call/visit for symptoms/problems? PCP, Specialist, Home health nurse, Urgent Care, ED, 911  Yes   Is the patient able to teach back COPD zones?  -- [Spoke to son today. No zone teaching with son.]   Is the patient/caregiver able to teach back signs and symptoms of worsening condition:  Fever/chills, Shortness of breath, Chest pain   Is the patient/caregiver able to teach  back importance of completing antibiotic course of treatment?  Yes   Week 2 call completed?  Yes          Raisa Alvarez RN

## 2018-10-03 ENCOUNTER — OFFICE VISIT (OUTPATIENT)
Dept: INTERNAL MEDICINE | Facility: CLINIC | Age: 58
End: 2018-10-03

## 2018-10-03 ENCOUNTER — RESULTS ENCOUNTER (OUTPATIENT)
Dept: INTERNAL MEDICINE | Facility: CLINIC | Age: 58
End: 2018-10-03

## 2018-10-03 ENCOUNTER — READMISSION MANAGEMENT (OUTPATIENT)
Dept: CALL CENTER | Facility: HOSPITAL | Age: 58
End: 2018-10-03

## 2018-10-03 VITALS
BODY MASS INDEX: 22.48 KG/M2 | TEMPERATURE: 98 F | OXYGEN SATURATION: 100 % | HEART RATE: 64 BPM | DIASTOLIC BLOOD PRESSURE: 78 MMHG | HEIGHT: 70 IN | SYSTOLIC BLOOD PRESSURE: 150 MMHG | WEIGHT: 157 LBS

## 2018-10-03 DIAGNOSIS — I10 ESSENTIAL HYPERTENSION: Primary | ICD-10-CM

## 2018-10-03 DIAGNOSIS — Z12.11 ENCOUNTER FOR SCREENING COLONOSCOPY: ICD-10-CM

## 2018-10-03 DIAGNOSIS — R79.89 LOW TSH LEVEL: ICD-10-CM

## 2018-10-03 PROCEDURE — 99214 OFFICE O/P EST MOD 30 MIN: CPT | Performed by: INTERNAL MEDICINE

## 2018-10-03 RX ORDER — HYDROCHLOROTHIAZIDE 12.5 MG/1
12.5 TABLET ORAL DAILY
Qty: 30 TABLET | Refills: 5 | Status: SHIPPED | OUTPATIENT
Start: 2018-10-03 | End: 2019-04-21 | Stop reason: SDUPTHER

## 2018-10-03 NOTE — PATIENT INSTRUCTIONS

## 2018-10-03 NOTE — PROGRESS NOTES
Chief Complaint   Patient presents with   • Edema     bilateral ankle swelling; denies any shortness of breath       Subjective     History of Present Illness   Cristela Stratton is a 58 y.o. female presents for follow-up.  Patient shares that her lower extremity edema has improved however she does have some bilateral ankle swelling.  She denies any shortness of breath or chest pains.  Patient is to follow up with cardiology next week.  Her blood pressures have been running high in the 150s range at home.  She states that she has been on amlodipine for 2 years    The following portions of the patient's history were reviewed and updated as appropriate: allergies, current medications, past family history, past medical history, past social history, past surgical history and problem list.    Review of Systems   Constitutional: Negative for chills, fatigue and fever.   HENT: Negative for congestion, ear pain, rhinorrhea, sinus pressure and sore throat.    Eyes: Negative for visual disturbance.   Respiratory: Negative for cough, chest tightness, shortness of breath and wheezing.    Cardiovascular: Positive for leg swelling. Negative for chest pain and palpitations.   Gastrointestinal: Negative for abdominal pain, blood in stool, constipation, diarrhea, nausea and vomiting.   Endocrine: Negative for polydipsia and polyuria.   Genitourinary: Negative for dysuria and hematuria.   Musculoskeletal: Negative for back pain.   Skin: Negative for rash.   Neurological: Negative for dizziness, light-headedness, numbness and headaches.   Psychiatric/Behavioral: Negative for dysphoric mood and sleep disturbance. The patient is not nervous/anxious.        No Known Allergies    Past Medical History:   Diagnosis Date   • Atrial fibrillation (CMS/HCC)     CHADS-VASC = 2   • Hypertension        Social History     Social History   • Marital status:      Spouse name: N/A   • Number of children: N/A   • Years of education: N/A  "    Occupational History   • Not on file.     Social History Main Topics   • Smoking status: Former Smoker     Packs/day: 1.00     Years: 22.00     Types: Cigarettes   • Smokeless tobacco: Never Used   • Alcohol use No   • Drug use: No   • Sexual activity: No     Other Topics Concern   • Not on file     Social History Narrative   • No narrative on file        No past surgical history on file.    Family History   Problem Relation Age of Onset   • Cancer Mother    • Cancer Maternal Aunt    • Alcohol abuse Maternal Grandfather    • Heart disease Maternal Grandfather          Current Outpatient Prescriptions:   •  acetaminophen (TYLENOL) 500 MG tablet, Take 500 mg by mouth Every 6 (Six) Hours As Needed for mild pain (1-3)., Disp: , Rfl:   •  amLODIPine (NORVASC) 10 MG tablet, TAKE ONE TABLET BY MOUTH ONCE DAILY, Disp: 90 tablet, Rfl: 3  •  budesonide-formoterol (SYMBICORT) 80-4.5 MCG/ACT inhaler, Inhale 2 puffs 2 (Two) Times a Day., Disp: 1 inhaler, Rfl: 0  •  nicotine (NICODERM CQ) 14 MG/24HR patch, Place 1 patch on the skin as directed by provider Daily., Disp: 21 each, Rfl: 2  •  sotalol (BETAPACE) 80 MG tablet, TAKE ONE TABLET BY MOUTH TWICE DAILY, Disp: 60 tablet, Rfl: 6  •  XARELTO 20 MG tablet, TAKE 1 TABLET BY MOUTH ONCE DAILY WITH  DINNER, Disp: 90 tablet, Rfl: 1  •  hydrochlorothiazide (HYDRODIURIL) 12.5 MG tablet, Take 1 tablet by mouth Daily., Disp: 30 tablet, Rfl: 5    Objective   /78 (BP Location: Right arm, Patient Position: Sitting)   Pulse 64   Temp 98 °F (36.7 °C)   Ht 177.8 cm (70\")   Wt 71.2 kg (157 lb)   LMP  (LMP Unknown)   SpO2 100%   BMI 22.53 kg/m²     Physical Exam   Constitutional: She is oriented to person, place, and time. She appears well-developed and well-nourished.   HENT:   Head: Normocephalic and atraumatic.   Eyes: Conjunctivae are normal.   Cardiovascular: Normal rate, regular rhythm and normal heart sounds.    Pulmonary/Chest: Effort normal.   Musculoskeletal: Normal " range of motion. She exhibits edema (mild bilateral ankle).   Neurological: She is alert and oriented to person, place, and time.   Psychiatric: She has a normal mood and affect. Her behavior is normal.   Nursing note and vitals reviewed.      Assessment/Plan   Cristela was seen today for edema.    Diagnoses and all orders for this visit:    Essential hypertension  -     hydrochlorothiazide (HYDRODIURIL) 12.5 MG tablet; Take 1 tablet by mouth Daily.    Encounter for screening colonoscopy  -     Cologuard - Stool, Per Rectum; Future    Low TSH level  -     TSH+Free T4          Discussion Summary:    58-year-old white female presenting for follow-up on medical problems.    1.  Essential hypertension  -Blood pressure remains high even after coming off of steroids.  Start HCTZ which may help with some ankle edema area her ankle edema may be related to amlodipine use.  Changing blood pressure medications further can be considered with cardiology.    2.  Screening colonoscopy  -Patient defers on colonoscopy.  Will do Cologuard testing.    3.  Low TSH-noted in hospitalization last month  -Check levels to ensure normalization.    Follow up:  Return in about 3 months (around 1/3/2019) for Annual physical.     Patient Instructions:  Patient instructions were provided.

## 2018-10-03 NOTE — OUTREACH NOTE
COPD/PN Week 3 Survey      Responses   Facility patient discharged from?  [facility]   Does the patient have one of the following disease processes/diagnoses(primary or secondary)?  COPD/Pneumonia   Was the primary reason for admission:  Pneumonia   Week 3 attempt successful?  No   Unsuccessful attempts  Attempt 1          Ousmane Garay RN

## 2018-10-05 ENCOUNTER — READMISSION MANAGEMENT (OUTPATIENT)
Dept: CALL CENTER | Facility: HOSPITAL | Age: 58
End: 2018-10-05

## 2018-10-05 NOTE — OUTREACH NOTE
COPD/PN Week 3 Survey      Responses   Facility patient discharged from?  [facility]   Does the patient have one of the following disease processes/diagnoses(primary or secondary)?  COPD/Pneumonia   Was the primary reason for admission:  Pneumonia   Week 3 attempt successful?  Yes   Call start time  1459   Call end time  1504   Discharge diagnosis  PNA, COPD   Meds reviewed with patient/caregiver?  Yes   Is the patient having any side effects they believe may be caused by any medication additions or changes?  No   Does the patient have all medications ordered at discharge?  Yes   Is the patient taking all medications as directed (includes completed medication regime)?  Yes   Medication comments  New BP Medication   Does the patient have a primary care provider?   Yes   Does the patient have an appointment with their PCP or pulmonologist within 7 days of discharge?  Yes   Has the patient kept scheduled appointments due by today?  Yes   Has home health visited the patient within 72 hours of discharge?  N/A   Psychosocial issues?  No   Did the patient receive a copy of their discharge instructions?  Yes   Nursing interventions  Reviewed instructions with patient   What is the patient's perception of their health status since discharge?  Improving   Nursing Interventions  Nurse provided patient education   Are the patient's immunizations up to date?   No   Nursing interventions  Advised patient to discuss with provider at next visit   Is the patient/caregiver able to teach back the hierarchy of who to call/visit for symptoms/problems? PCP, Specialist, Home health nurse, Urgent Care, ED, 911  Yes   Additional teach back comments  Hasn't smoked in over a month.   Is the patient/caregiver able to teach back signs and symptoms of worsening condition:  Fever/chills, Shortness of breath, Chest pain   Is the patient/caregiver able to teach back importance of completing antibiotic course of treatment?  Yes   Week 3 call  completed?  Yes          Kenzie Murillo RN

## 2018-10-08 ENCOUNTER — OFFICE VISIT (OUTPATIENT)
Dept: CARDIOLOGY | Facility: CLINIC | Age: 58
End: 2018-10-08

## 2018-10-08 VITALS
HEIGHT: 70 IN | WEIGHT: 160 LBS | BODY MASS INDEX: 22.9 KG/M2 | SYSTOLIC BLOOD PRESSURE: 140 MMHG | HEART RATE: 61 BPM | DIASTOLIC BLOOD PRESSURE: 80 MMHG

## 2018-10-08 DIAGNOSIS — I48.0 PAROXYSMAL ATRIAL FIBRILLATION (HCC): Primary | ICD-10-CM

## 2018-10-08 PROCEDURE — 99213 OFFICE O/P EST LOW 20 MIN: CPT | Performed by: INTERNAL MEDICINE

## 2018-10-08 PROCEDURE — 93000 ELECTROCARDIOGRAM COMPLETE: CPT | Performed by: INTERNAL MEDICINE

## 2018-10-08 NOTE — PROGRESS NOTES
Subjective:   Cristela Stratton  1960  [phone]  [phone]    10/24/2016    [facility] CARDIOLOGY    Jluis Licea, DO  [address] [facility] [address]  [facility] [address]    REFERRING DOCTOR: DR Davidson      Patient ID: Cristela Stratton is a 58 y.o. female.    Chief Complaint: Paroxysmal Afib, HTN    PROBLEM list  1.  Paroxysmal atrial fibrillation              A.  CHADS2 Vasc = 2.              B.  Currently on Sotalol and Xarelto              C. Last Afib in Sept 2018 when she was in the hospital for LLL pneumonia.   2.  Essential hypertension  3.  Ongoing Tobacco Abuse now stopped 4 weeks ago  4. COPD on inhaler  5.  Valvular heart disease              A.  non-rheumatic              B.  TTE 9/2016  · All left ventricular wall segments contract normally.  · Left ventricular function is normal.  · Mild tricuspid valve regurgitation is present.  · Mild pulmonic valve regurgitation is present.  · Mild mitral valve regurgitation is present         No Known Allergies    Current Outpatient Prescriptions:   •  acetaminophen (TYLENOL) 500 MG tablet, Take 500 mg by mouth Every 6 (Six) Hours As Needed for mild pain (1-3)., Disp: , Rfl:   •  amLODIPine (NORVASC) 10 MG tablet, TAKE ONE TABLET BY MOUTH ONCE DAILY, Disp: 90 tablet, Rfl: 3  •  budesonide-formoterol (SYMBICORT) 80-4.5 MCG/ACT inhaler, Inhale 2 puffs 2 (Two) Times a Day. (Patient taking differently: Inhale 2 puffs As Needed.), Disp: 1 inhaler, Rfl: 0  •  nicotine (NICODERM CQ) 14 MG/24HR patch, Place 1 patch on the skin as directed by provider Daily., Disp: 21 each, Rfl: 2  •  sotalol (BETAPACE) 80 MG tablet, TAKE ONE TABLET BY MOUTH TWICE DAILY, Disp: 60 tablet, Rfl: 6  •  XARELTO 20 MG tablet, TAKE 1 TABLET BY MOUTH ONCE DAILY WITH  DINNER, Disp: 90 tablet, Rfl: 1  •  hydrochlorothiazide (HYDRODIURIL) 12.5 MG tablet, Take 1 tablet by mouth Daily., Disp: 30 tablet, Rfl: 5    History of Present Illness   Patient is a 58-year-old  "female with a past medical history of hypertension, tobacco abuse but stopped about 4 weeks ago, history of previous bronchitis and mitral valve prolapse that presents to [facility] in 9/2016 for new diagnosis of Afib with RVR and bronchitis. She self converted to sinus and was started on sotalol and NOAC.  Was doing well overall until early Sept 2018 when she was diagnosed with LLL pneumonia and found to be in Afib with RVR at that time but was given steroids, Abx, etc. Feeling a lot better since that time with no real compliants other then some LE swelling noted. Last Echo in 2016 with normal EF, mild TR, MR and ID noted at that time.     The following portions of the patient's history were reviewed and updated as appropriate: allergies, current medications, past family history, past medical history, past social history, past surgical history and problem list.    ROS   14 point ROS negative except as outlined in problem list, HPI and other parts of the note.      ECG 12 Lead  Date/Time: 10/8/2018 10:13 AM  Performed by: MEGHAN COREA  Authorized by: MEGHAN COREA   Rhythm: sinus rhythm  Conduction: 1st degree  Comments: QTc ok.   HR 61 bpm                 Objective:       Vitals:    10/08/18 0946   BP: 140/80   BP Location: Left arm   Patient Position: Sitting   Pulse: 61   Weight: 72.6 kg (160 lb)   Height: 177.8 cm (70\")       GENERAL: Well-developed, well-nourished patient in no acute distress.  HEENT: Normocephalic, atraumatic, PERRLA. Moist mucous membranes.  NECK: No JVD present at 30°. No carotid bruits auscultated.  LUNGS: Clear to auscultation.  CARDIOVASCULAR: Heart has a regular rate and rhythm. No murmurs, gallops or rubs noted.   ABDOMEN: Soft, nontender. Positive bowel sounds.  MUSCULOSKELETAL: No gross deformities. No clubbing, cyanosis, or lower extremity edema.  SKIN: Pink, warm  Neuro: Nonfocal exam. Gait intact  Ext: No edema or bruising    The patient's old records including ambulatory rhythm " recordings (ECGs, Holter/event monitor) were reviewed and discussed.      Lab Review:   Results for orders placed or performed during the hospital encounter of 09/07/18   Blood Culture With MIREILLE - Blood,   Result Value Ref Range    Blood Culture No growth at 5 days    Blood Culture With MIREILLE - Blood,   Result Value Ref Range    Blood Culture No growth at 5 days    Comprehensive Metabolic Panel   Result Value Ref Range    Glucose 112 (H) 74 - 98 mg/dL    BUN 6 (L) 7 - 20 mg/dL    Creatinine 0.70 0.60 - 1.30 mg/dL    Sodium 141 137 - 145 mmol/L    Potassium 3.4 (L) 3.5 - 5.1 mmol/L    Chloride 107 98 - 107 mmol/L    CO2 24.0 (L) 26.0 - 30.0 mmol/L    Calcium 9.2 8.4 - 10.2 mg/dL    Total Protein 7.5 6.3 - 8.2 g/dL    Albumin 4.20 3.50 - 5.00 g/dL    ALT (SGPT) 14 13 - 69 U/L    AST (SGOT) 20 15 - 46 U/L    Alkaline Phosphatase 106 38 - 126 U/L    Total Bilirubin 0.7 0.2 - 1.3 mg/dL    eGFR Non African Amer 85 >60 mL/min/1.73    Globulin 3.3 gm/dL    A/G Ratio 1.3 1.0 - 2.0 g/dL    BUN/Creatinine Ratio 8.6 7.1 - 23.5    Anion Gap 13.4 10.0 - 20.0 mmol/L   BNP   Result Value Ref Range    proBNP 2,110.0 (C) 0.0 - 125.0 pg/mL   Troponin   Result Value Ref Range    Troponin I <0.012 0.000 - 0.034 ng/mL   D-dimer, Quantitative   Result Value Ref Range    D-Dimer, Quantitative 358 0 - 500 ng/mL (FEU)   CBC Auto Differential   Result Value Ref Range    WBC 9.67 4.80 - 10.80 10*3/mm3    RBC 4.92 4.20 - 5.40 10*6/mm3    Hemoglobin 14.7 12.0 - 16.0 g/dL    Hematocrit 43.9 37.0 - 47.0 %    MCV 89.2 81.0 - 99.0 fL    MCH 29.9 27.0 - 31.0 pg    MCHC 33.5 30.0 - 37.0 g/dL    RDW 12.9 11.5 - 14.5 %    RDW-SD 42.5 37.0 - 54.0 fl    MPV 9.5 6.0 - 12.0 fL    Platelets 278 130 - 400 10*3/mm3    Neutrophil % 64.9 37.0 - 80.0 %    Lymphocyte % 23.7 10.0 - 50.0 %    Monocyte % 9.6 0.0 - 12.0 %    Eosinophil % 1.3 0.0 - 7.0 %    Basophil % 0.3 0.0 - 2.5 %    Immature Grans % 0.2 0.0 - 0.6 %    Neutrophils, Absolute 6.27 2.00 - 6.90 10*3/mm3     Lymphocytes, Absolute 2.29 0.60 - 3.40 10*3/mm3    Monocytes, Absolute 0.93 (H) 0.00 - 0.90 10*3/mm3    Eosinophils, Absolute 0.13 0.00 - 0.70 10*3/mm3    Basophils, Absolute 0.03 0.00 - 0.20 10*3/mm3    Immature Grans, Absolute 0.02 0.00 - 0.06 10*3/mm3    nRBC 0.0 0.0 - 0.0 /100 WBC   Blood Gas, Arterial With Co-Ox   Result Value Ref Range    Site Right Brachial     Alli's Test N/A     pH, Arterial 7.525 (C) 7.300 - 7.500 pH units    pCO2, Arterial 30.1 (L) 35.0 - 45.0 mm Hg    pO2, Arterial 71.1 (L) 75.0 - 100.0 mm Hg    HCO3, Arterial 24.8 22.0 - 28.0 mmol/L    Base Excess, Arterial 2.8 (H) 0.0 - 2.0 mmol/L    O2 Saturation, Arterial 96.1 94.0 - 100.0 %    Hemoglobin, Blood Gas 14.0 12 - 18 g/dL    Hematocrit, Blood Gas 43.0 %    Oxyhemoglobin 94.8 94 - 99 %    Methemoglobin 0.70 0.00 - 1.50 %    Carboxyhemoglobin 0.7 0 - 2 %    Barometric Pressure for Blood Gas 738 mmHg    Modality Room Air     FIO2 21 %    Ventilator Mode NA     Collected by salena     pH, Temp Corrected  pH Units    pCO2, Temperature Corrected  35 - 45 mm Hg    pO2, Temperature Corrected  83 - 108 mm Hg   Procalcitonin   Result Value Ref Range    Procalcitonin <0.05 <=0.25 ng/mL   Basic Metabolic Panel   Result Value Ref Range    Glucose 133 (H) 74 - 98 mg/dL    BUN 10 7 - 20 mg/dL    Creatinine 0.60 0.60 - 1.30 mg/dL    Sodium 140 137 - 145 mmol/L    Potassium 3.9 3.5 - 5.1 mmol/L    Chloride 108 (H) 98 - 107 mmol/L    CO2 24.0 (L) 26.0 - 30.0 mmol/L    Calcium 9.2 8.4 - 10.2 mg/dL    eGFR Non African Amer 102 >60 mL/min/1.73    BUN/Creatinine Ratio 16.7 7.1 - 23.5    Anion Gap 11.9 10.0 - 20.0 mmol/L   CBC Auto Differential   Result Value Ref Range    WBC 8.66 4.80 - 10.80 10*3/mm3    RBC 4.28 4.20 - 5.40 10*6/mm3    Hemoglobin 12.9 12.0 - 16.0 g/dL    Hematocrit 38.2 37.0 - 47.0 %    MCV 89.3 81.0 - 99.0 fL    MCH 30.1 27.0 - 31.0 pg    MCHC 33.8 30.0 - 37.0 g/dL    RDW 13.0 11.5 - 14.5 %    RDW-SD 42.5 37.0 - 54.0 fl    MPV 10.0 6.0 - 12.0  fL    Platelets 248 130 - 400 10*3/mm3    Neutrophil % 87.1 (H) 37.0 - 80.0 %    Lymphocyte % 10.0 10.0 - 50.0 %    Monocyte % 2.4 0.0 - 12.0 %    Eosinophil % 0.0 0.0 - 7.0 %    Basophil % 0.0 0.0 - 2.5 %    Immature Grans % 0.5 0.0 - 0.6 %    Neutrophils, Absolute 7.54 (H) 2.00 - 6.90 10*3/mm3    Lymphocytes, Absolute 0.87 0.60 - 3.40 10*3/mm3    Monocytes, Absolute 0.21 0.00 - 0.90 10*3/mm3    Eosinophils, Absolute 0.00 0.00 - 0.70 10*3/mm3    Basophils, Absolute 0.00 0.00 - 0.20 10*3/mm3    Immature Grans, Absolute 0.04 0.00 - 0.06 10*3/mm3    nRBC 0.0 0.0 - 0.0 /100 WBC   Procalcitonin   Result Value Ref Range    Procalcitonin <0.05 <=0.25 ng/mL   TSH   Result Value Ref Range    TSH 0.219 (L) 0.470 - 4.680 mIU/mL   Basic Metabolic Panel   Result Value Ref Range    Glucose 116 (H) 74 - 98 mg/dL    BUN 14 7 - 20 mg/dL    Creatinine 0.70 0.60 - 1.30 mg/dL    Sodium 139 137 - 145 mmol/L    Potassium 3.9 3.5 - 5.1 mmol/L    Chloride 107 98 - 107 mmol/L    CO2 24.0 (L) 26.0 - 30.0 mmol/L    Calcium 9.1 8.4 - 10.2 mg/dL    eGFR Non African Amer 85 >60 mL/min/1.73    BUN/Creatinine Ratio 20.0 7.1 - 23.5    Anion Gap 11.9 10.0 - 20.0 mmol/L   CBC Auto Differential   Result Value Ref Range    WBC 8.80 4.80 - 10.80 10*3/mm3    RBC 4.19 (L) 4.20 - 5.40 10*6/mm3    Hemoglobin 12.5 12.0 - 16.0 g/dL    Hematocrit 37.2 37.0 - 47.0 %    MCV 88.8 81.0 - 99.0 fL    MCH 29.8 27.0 - 31.0 pg    MCHC 33.6 30.0 - 37.0 g/dL    RDW 13.1 11.5 - 14.5 %    RDW-SD 42.3 37.0 - 54.0 fl    MPV 10.1 6.0 - 12.0 fL    Platelets 270 130 - 400 10*3/mm3    Neutrophil % 82.6 (H) 37.0 - 80.0 %    Lymphocyte % 13.0 10.0 - 50.0 %    Monocyte % 3.0 0.0 - 12.0 %    Eosinophil % 0.0 0.0 - 7.0 %    Basophil % 0.1 0.0 - 2.5 %    Immature Grans % 1.3 (H) 0.0 - 0.6 %    Neutrophils, Absolute 7.28 (H) 2.00 - 6.90 10*3/mm3    Lymphocytes, Absolute 1.14 0.60 - 3.40 10*3/mm3    Monocytes, Absolute 0.26 0.00 - 0.90 10*3/mm3    Eosinophils, Absolute 0.00 0.00 - 0.70  10*3/mm3    Basophils, Absolute 0.01 0.00 - 0.20 10*3/mm3    Immature Grans, Absolute 0.11 (H) 0.00 - 0.06 10*3/mm3    nRBC 0.0 0.0 - 0.0 /100 WBC   T4, Free   Result Value Ref Range    Free T4 1.12 0.78 - 2.19 ng/dL   Light Blue Top   Result Value Ref Range    Extra Tube hold for add-on    Lavender Top   Result Value Ref Range    Extra Tube hold for add-on    Gold Top - SST   Result Value Ref Range    Extra Tube Hold for add-ons.    Green Top (No Gel)   Result Value Ref Range    Extra Tube Hold for add-ons.      ECHO: 9/2016  · All left ventricular wall segments contract normally.  · Left ventricular function is normal.  · Mild tricuspid valve regurgitation is present.  · Mild pulmonic valve regurgitation is present.  · Mild mitral valve regurgitation is present      Diagnosis:   1. Paroxysmal atrial fibrillation  2. Hypertension Uncontrolled    Assessment & Plan:   1. Paroxymal Atrial fibrillation:  Sotalol 80 mg BID and Xarelto 20 mg daily.  CHADS VASC 2. Last Afib in setting again of PNA with previous episode in 2016 in setting of bronchitis. No recurrences since Sept 2018 after DC from hospital for pneumonia.  QTc ok. Will recheck echo to monitor valvular disease and with new LE edema worsening. Mild MR, TR and AZ on last echo in 2016   2. HTN -- On Sotalol , and recently started on HCTZ.  Continue current Rx, BP better today  3. PNA:  improved.   4. Tobacco use but stopped 4 weeks ago  5. Follow up with me in 6 mths and Dr Lety Packer DO  10/08/18  10:04 AM

## 2018-10-16 ENCOUNTER — READMISSION MANAGEMENT (OUTPATIENT)
Dept: CALL CENTER | Facility: HOSPITAL | Age: 58
End: 2018-10-16

## 2018-10-16 NOTE — OUTREACH NOTE
Medical Week 4 Survey      Responses   Facility patient discharged from?  [facility]   Does the patient have one of the following disease processes/diagnoses(primary or secondary)?  COPD/Pneumonia          Elida Booker RN

## 2018-10-17 NOTE — OUTREACH NOTE
COPD/PN Week 4 Survey      Responses   Was the primary reason for admission:  Pneumonia   Week 4 attempt successful?  No          Elida Booker RN

## 2018-10-19 ENCOUNTER — OFFICE VISIT (OUTPATIENT)
Dept: INTERNAL MEDICINE | Facility: CLINIC | Age: 58
End: 2018-10-19

## 2018-10-19 VITALS
BODY MASS INDEX: 22.19 KG/M2 | WEIGHT: 155 LBS | OXYGEN SATURATION: 100 % | SYSTOLIC BLOOD PRESSURE: 148 MMHG | HEIGHT: 70 IN | DIASTOLIC BLOOD PRESSURE: 92 MMHG | TEMPERATURE: 98.4 F | HEART RATE: 85 BPM

## 2018-10-19 DIAGNOSIS — K04.7 DENTAL INFECTION: Primary | ICD-10-CM

## 2018-10-19 PROCEDURE — 99213 OFFICE O/P EST LOW 20 MIN: CPT | Performed by: INTERNAL MEDICINE

## 2018-10-19 RX ORDER — AMOXICILLIN AND CLAVULANATE POTASSIUM 875; 125 MG/1; MG/1
1 TABLET, FILM COATED ORAL EVERY 12 HOURS SCHEDULED
Qty: 14 TABLET | Refills: 0 | OUTPATIENT
Start: 2018-10-19 | End: 2018-11-18

## 2018-10-21 NOTE — PROGRESS NOTES
Chief Complaint   Patient presents with   • Dental Pain     patient has been unable to get in with a dentist until next wednesday; patient has been taking left over cefdinir        Subjective     History of Present Illness   Cristela Stratton is a 58 y.o. female presenting with 1 week of progressively worsening right lower jaw pain which is now developing swelling and bad taste in her mouth.  Patient is trying to see a dentist however she has not been able to get in until next Wednesday.    The following portions of the patient's history were reviewed and updated as appropriate: allergies, current medications, past family history, past medical history, past social history, past surgical history and problem list.    Review of Systems   Constitutional: Negative for chills, fatigue and fever.   HENT: Positive for dental problem. Negative for congestion, ear pain, rhinorrhea, sinus pressure and sore throat.    Eyes: Negative for visual disturbance.   Respiratory: Negative for cough, chest tightness, shortness of breath and wheezing.    Cardiovascular: Negative for chest pain, palpitations and leg swelling.   Gastrointestinal: Negative for abdominal pain, blood in stool, constipation, diarrhea, nausea and vomiting.   Endocrine: Negative for polydipsia and polyuria.   Genitourinary: Negative for dysuria and hematuria.   Musculoskeletal: Negative for back pain.   Skin: Negative for rash.   Neurological: Negative for dizziness, light-headedness, numbness and headaches.   Psychiatric/Behavioral: Negative for dysphoric mood and sleep disturbance. The patient is not nervous/anxious.        No Known Allergies    Past Medical History:   Diagnosis Date   • Atrial fibrillation (CMS/[facility])     CHADS-VASC = 2   • Hypertension        Social History     Social History   • Marital status:      Spouse name: N/A   • Number of children: N/A   • Years of education: N/A     Occupational History   • Not on file.     Social History Main  "Topics   • Smoking status: Former Smoker     Packs/day: 1.00     Years: 22.00     Types: Cigarettes     Quit date: 9/10/2018   • Smokeless tobacco: Never Used   • Alcohol use No   • Drug use: No   • Sexual activity: No     Other Topics Concern   • Not on file     Social History Narrative   • No narrative on file        No past surgical history on file.    Family History   Problem Relation Age of Onset   • Cancer Mother    • Cancer Maternal Aunt    • Alcohol abuse Maternal Grandfather    • Heart disease Maternal Grandfather          Current Outpatient Prescriptions:   •  acetaminophen (TYLENOL) 500 MG tablet, Take 500 mg by mouth Every 6 (Six) Hours As Needed for mild pain (1-3)., Disp: , Rfl:   •  amLODIPine (NORVASC) 10 MG tablet, TAKE ONE TABLET BY MOUTH ONCE DAILY, Disp: 90 tablet, Rfl: 3  •  budesonide-formoterol (SYMBICORT) 80-4.5 MCG/ACT inhaler, Inhale 2 puffs 2 (Two) Times a Day. (Patient taking differently: Inhale 2 puffs As Needed.), Disp: 1 inhaler, Rfl: 0  •  hydrochlorothiazide (HYDRODIURIL) 12.5 MG tablet, Take 1 tablet by mouth Daily., Disp: 30 tablet, Rfl: 5  •  nicotine (NICODERM CQ) 14 MG/24HR patch, Place 1 patch on the skin as directed by provider Daily., Disp: 21 each, Rfl: 2  •  sotalol (BETAPACE) 80 MG tablet, TAKE ONE TABLET BY MOUTH TWICE DAILY, Disp: 60 tablet, Rfl: 6  •  XARELTO 20 MG tablet, TAKE 1 TABLET BY MOUTH ONCE DAILY WITH  DINNER, Disp: 90 tablet, Rfl: 1  •  amoxicillin-clavulanate (AUGMENTIN) 875-125 MG per tablet, Take 1 tablet by mouth Every 12 (Twelve) Hours., Disp: 14 tablet, Rfl: 0    Objective   /92 (BP Location: Left arm, Patient Position: Sitting)   Pulse 85   Temp 98.4 °F (36.9 °C)   Ht 177.8 cm (70\")   Wt 70.3 kg (155 lb)   LMP  (LMP Unknown)   SpO2 100%   BMI 22.24 kg/m²     Physical Exam   Constitutional: She is oriented to person, place, and time. She appears well-developed and well-nourished.   HENT:   Head: Normocephalic and atraumatic.   Poor " dentition with malodorous breath.  Swelling in right lower jaw notable   Eyes: Conjunctivae are normal.   Pulmonary/Chest: Effort normal.   Musculoskeletal: Normal range of motion.   Neurological: She is alert and oriented to person, place, and time.   Psychiatric: She has a normal mood and affect. Her behavior is normal.   Nursing note and vitals reviewed.      Assessment/Plan   Cristela was seen today for dental pain.    Diagnoses and all orders for this visit:    Dental infection  -     amoxicillin-clavulanate (AUGMENTIN) 875-125 MG per tablet; Take 1 tablet by mouth Every 12 (Twelve) Hours.          Discussion Summary:    58-year-old white female presenting with dental infection.  Patient will be started on Augmentin.  She was advised to follow-up with a dentist for appropriate procedures as her infection likely would not improve without intervention.    Follow up:  No Follow-up on file.     Patient Instructions:  Patient instructions were provided.

## 2018-10-24 ENCOUNTER — APPOINTMENT (OUTPATIENT)
Dept: CARDIOLOGY | Facility: HOSPITAL | Age: 58
End: 2018-10-24
Attending: INTERNAL MEDICINE

## 2018-11-09 DIAGNOSIS — I10 ESSENTIAL HYPERTENSION: ICD-10-CM

## 2018-11-09 RX ORDER — AMLODIPINE BESYLATE 10 MG/1
10 TABLET ORAL DAILY
Qty: 90 TABLET | Refills: 3 | Status: SHIPPED | OUTPATIENT
Start: 2018-11-09 | End: 2020-01-13

## 2018-11-19 DIAGNOSIS — I10 ESSENTIAL HYPERTENSION: ICD-10-CM

## 2018-11-19 RX ORDER — AMLODIPINE BESYLATE 10 MG/1
TABLET ORAL
Qty: 90 TABLET | Refills: 3 | Status: SHIPPED | OUTPATIENT
Start: 2018-11-19 | End: 2019-04-29

## 2018-11-24 DIAGNOSIS — Z72.0 TOBACCO ABUSE: ICD-10-CM

## 2018-11-27 RX ORDER — NICOTINE 21 MG/24HR
1 PATCH, TRANSDERMAL 24 HOURS TRANSDERMAL EVERY 24 HOURS
Qty: 21 PATCH | Refills: 1 | Status: SHIPPED | OUTPATIENT
Start: 2018-11-27 | End: 2019-04-29

## 2018-12-11 ENCOUNTER — TELEPHONE (OUTPATIENT)
Dept: INTERNAL MEDICINE | Facility: CLINIC | Age: 58
End: 2018-12-11

## 2019-01-21 ENCOUNTER — TELEPHONE (OUTPATIENT)
Dept: CARDIOLOGY | Facility: CLINIC | Age: 59
End: 2019-01-21

## 2019-02-11 RX ORDER — SOTALOL HYDROCHLORIDE 80 MG/1
TABLET ORAL
Qty: 60 TABLET | Refills: 11 | Status: SHIPPED | OUTPATIENT
Start: 2019-02-11 | End: 2020-02-13 | Stop reason: SDUPTHER

## 2019-02-21 ENCOUNTER — TELEPHONE (OUTPATIENT)
Dept: INTERNAL MEDICINE | Facility: CLINIC | Age: 59
End: 2019-02-21

## 2019-02-21 DIAGNOSIS — Z20.828 EXPOSURE TO MEASLES VIRUS: Primary | ICD-10-CM

## 2019-02-21 NOTE — TELEPHONE ENCOUNTER
Pt states that she was in the office the other day with her daughter and states that a nurse had told them that they had been exposed to measles because someone in the office had been in that same day and they had sat in the same chairs. Pt is requesting a call back from doctor or nurse to discuss whether the patient would need to come in for a measles shot. She states that due to her age this has worried her and she is hoping to hear back regarding this. Please advise.

## 2019-02-22 DIAGNOSIS — Z20.828 EXPOSURE TO MEASLES VIRUS: ICD-10-CM

## 2019-04-21 DIAGNOSIS — I10 ESSENTIAL HYPERTENSION: ICD-10-CM

## 2019-04-22 RX ORDER — HYDROCHLOROTHIAZIDE 12.5 MG/1
TABLET ORAL
Qty: 30 TABLET | Refills: 4 | Status: SHIPPED | OUTPATIENT
Start: 2019-04-22 | End: 2019-10-10 | Stop reason: SDUPTHER

## 2019-04-29 ENCOUNTER — OFFICE VISIT (OUTPATIENT)
Dept: CARDIOLOGY | Facility: CLINIC | Age: 59
End: 2019-04-29

## 2019-04-29 VITALS
DIASTOLIC BLOOD PRESSURE: 82 MMHG | HEART RATE: 69 BPM | SYSTOLIC BLOOD PRESSURE: 118 MMHG | HEIGHT: 70 IN | WEIGHT: 173 LBS | BODY MASS INDEX: 24.77 KG/M2

## 2019-04-29 DIAGNOSIS — I48.0 PAF (PAROXYSMAL ATRIAL FIBRILLATION) (HCC): ICD-10-CM

## 2019-04-29 DIAGNOSIS — I34.1 MITRAL VALVE PROLAPSE: Primary | ICD-10-CM

## 2019-04-29 DIAGNOSIS — I10 ESSENTIAL HYPERTENSION: ICD-10-CM

## 2019-04-29 DIAGNOSIS — I48.0 PAROXYSMAL ATRIAL FIBRILLATION (HCC): ICD-10-CM

## 2019-04-29 PROBLEM — R60.0 LOWER EXTREMITY EDEMA: Status: ACTIVE | Noted: 2019-04-29

## 2019-04-29 PROCEDURE — 99213 OFFICE O/P EST LOW 20 MIN: CPT | Performed by: NURSE PRACTITIONER

## 2019-04-29 PROCEDURE — 93000 ELECTROCARDIOGRAM COMPLETE: CPT | Performed by: NURSE PRACTITIONER

## 2019-04-29 NOTE — PROGRESS NOTES
Subjective:   Cristela Stratton  1960  [phone]  [phone]    [facility] CARDIOLOGY    Jluis Licea, DO  [address] [id number]    REFERRING DOCTOR: DR Davidson      Patient ID: Cristela Stratton is a 59 y.o. female.    Chief Complaint: Paroxysmal Afib, HTN    PROBLEM list  1.  Paroxysmal atrial fibrillation              A.  CHADS2 Vasc = 2.              B.  Currently on Sotalol and Xarelto              C. Last Afib in Sept 2018 when she was in the hospital for LLL pneumonia.   2.  Essential hypertension  3.  Ongoing Tobacco Abuse now stopped 4 weeks ago  4. COPD on inhaler  5.  Valvular heart disease              A.  non-rheumatic              B.  TTE 9/2016  · All left ventricular wall segments contract normally.  · Left ventricular function is normal.  · Mild tricuspid valve regurgitation is present.  · Mild pulmonic valve regurgitation is present.  · Mild mitral valve regurgitation is present       No Known Allergies    Current Outpatient Medications:   •  acetaminophen (TYLENOL) 500 MG tablet, Take 500 mg by mouth Every 6 (Six) Hours As Needed for mild pain (1-3)., Disp: , Rfl:   •  amLODIPine (NORVASC) 10 MG tablet, Take 1 tablet by mouth Daily., Disp: 90 tablet, Rfl: 3  •  hydrochlorothiazide (HYDRODIURIL) 12.5 MG tablet, TAKE ONE TABLET BY MOUTH DAILY, Disp: 30 tablet, Rfl: 4  •  rivaroxaban (XARELTO) 20 MG tablet, Take 1 tablet by mouth Daily With Dinner., Disp: 90 tablet, Rfl: 2  •  sotalol (BETAPACE) 80 MG tablet, TAKE 1 TABLET BY MOUTH TWICE DAILY, Disp: 60 tablet, Rfl: 11    Atrial Fibrillation   Past medical history includes atrial fibrillation.      Patient is a 59-year-old female who presents today for follow up on hypertension,  mitral valve prolapse and atrial fibrillation. She quit smoking September 2018. She is followed by Dr. Davidson with f/u in September. She has not had recurrence of Afib since last office visit. Maintained on  "Sotalol and Xarelto. Needs upcoming full mouth extraction. Also reports lower extremity which she had at last visit too. No claudication but does not feet pain when at work as a  at [employer]. She was scheduled for echo but did not have this done s/t losing insurance. She denies palpitations, chest pain, sob, lh, dizziness. No syncope or near syncope. No recent hospital stays or ED visits.       The following portions of the patient's history were reviewed and updated as appropriate: allergies, current medications, past family history, past medical history, past social history, past surgical history and problem list.    ROS   14 point ROS negative except as outlined in problem list, HPI and other parts of the note.      ECG 12 Lead  Date/Time: 4/29/2019 11:19 AM  Performed by: Radha Apodaca APRN  Authorized by: Radha Apodaca APRN   Comparison: compared with previous ECG from 10/8/2018  Rhythm: sinus rhythm  BPM: 67                 Objective:       Vitals:    04/29/19 1025   BP: 118/82   BP Location: Left arm   Patient Position: Sitting   Pulse: 69   Weight: 78.5 kg (173 lb)   Height: 177.8 cm (70\")       GENERAL: Well-developed, well-nourished patient in no acute distress.  HEENT: Normocephalic, atraumatic, PERRLA. Moist mucous membranes.  NECK: No JVD present at 30°. No carotid bruits auscultated.  LUNGS: Clear to auscultation. Non labored  CARDIOVASCULAR: Heart has a regular rate and rhythm. No M/R/G. Pedal pulses 2+ bilaterally. Cap refill <2 secs  ABDOMEN: Soft, nontender. Positive bowel sounds.  MUSCULOSKELETAL: No gross deformities. No clubbing, cyanosis, or lower extremity edema.  SKIN: Pink, warm  Neuro: Nonfocal exam. Gait intact  Ext: trace edema.    The patient's old records including ambulatory rhythm recordings (ECGs, Holter/event monitor) were reviewed and discussed.      Lab Review:   Results for orders placed or performed during the hospital encounter of 09/07/18   Blood " Culture With MIREILLE - Blood,   Result Value Ref Range    Blood Culture No growth at 5 days    Blood Culture With MIREILLE - Blood,   Result Value Ref Range    Blood Culture No growth at 5 days    Comprehensive Metabolic Panel   Result Value Ref Range    Glucose 112 (H) 74 - 98 mg/dL    BUN 6 (L) 7 - 20 mg/dL    Creatinine 0.70 0.60 - 1.30 mg/dL    Sodium 141 137 - 145 mmol/L    Potassium 3.4 (L) 3.5 - 5.1 mmol/L    Chloride 107 98 - 107 mmol/L    CO2 24.0 (L) 26.0 - 30.0 mmol/L    Calcium 9.2 8.4 - 10.2 mg/dL    Total Protein 7.5 6.3 - 8.2 g/dL    Albumin 4.20 3.50 - 5.00 g/dL    ALT (SGPT) 14 13 - 69 U/L    AST (SGOT) 20 15 - 46 U/L    Alkaline Phosphatase 106 38 - 126 U/L    Total Bilirubin 0.7 0.2 - 1.3 mg/dL    eGFR Non African Amer 85 >60 mL/min/1.73    Globulin 3.3 gm/dL    A/G Ratio 1.3 1.0 - 2.0 g/dL    BUN/Creatinine Ratio 8.6 7.1 - 23.5    Anion Gap 13.4 10.0 - 20.0 mmol/L   BNP   Result Value Ref Range    proBNP 2,110.0 (C) 0.0 - 125.0 pg/mL   Troponin   Result Value Ref Range    Troponin I <0.012 0.000 - 0.034 ng/mL   D-dimer, Quantitative   Result Value Ref Range    D-Dimer, Quantitative 358 0 - 500 ng/mL (FEU)   CBC Auto Differential   Result Value Ref Range    WBC 9.67 4.80 - 10.80 10*3/mm3    RBC 4.92 4.20 - 5.40 10*6/mm3    Hemoglobin 14.7 12.0 - 16.0 g/dL    Hematocrit 43.9 37.0 - 47.0 %    MCV 89.2 81.0 - 99.0 fL    MCH 29.9 27.0 - 31.0 pg    MCHC 33.5 30.0 - 37.0 g/dL    RDW 12.9 11.5 - 14.5 %    RDW-SD 42.5 37.0 - 54.0 fl    MPV 9.5 6.0 - 12.0 fL    Platelets 278 130 - 400 10*3/mm3    Neutrophil % 64.9 37.0 - 80.0 %    Lymphocyte % 23.7 10.0 - 50.0 %    Monocyte % 9.6 0.0 - 12.0 %    Eosinophil % 1.3 0.0 - 7.0 %    Basophil % 0.3 0.0 - 2.5 %    Immature Grans % 0.2 0.0 - 0.6 %    Neutrophils, Absolute 6.27 2.00 - 6.90 10*3/mm3    Lymphocytes, Absolute 2.29 0.60 - 3.40 10*3/mm3    Monocytes, Absolute 0.93 (H) 0.00 - 0.90 10*3/mm3    Eosinophils, Absolute 0.13 0.00 - 0.70 10*3/mm3    Basophils, Absolute  0.03 0.00 - 0.20 10*3/mm3    Immature Grans, Absolute 0.02 0.00 - 0.06 10*3/mm3    nRBC 0.0 0.0 - 0.0 /100 WBC   Blood Gas, Arterial With Co-Ox   Result Value Ref Range    Site Right Brachial     Alli's Test N/A     pH, Arterial 7.525 (C) 7.300 - 7.500 pH units    pCO2, Arterial 30.1 (L) 35.0 - 45.0 mm Hg    pO2, Arterial 71.1 (L) 75.0 - 100.0 mm Hg    HCO3, Arterial 24.8 22.0 - 28.0 mmol/L    Base Excess, Arterial 2.8 (H) 0.0 - 2.0 mmol/L    O2 Saturation, Arterial 96.1 94.0 - 100.0 %    Hemoglobin, Blood Gas 14.0 12 - 18 g/dL    Hematocrit, Blood Gas 43.0 %    Oxyhemoglobin 94.8 94 - 99 %    Methemoglobin 0.70 0.00 - 1.50 %    Carboxyhemoglobin 0.7 0 - 2 %    Barometric Pressure for Blood Gas 738 mmHg    Modality Room Air     FIO2 21 %    Ventilator Mode NA     Collected by salena     pH, Temp Corrected  pH Units    pCO2, Temperature Corrected  35 - 45 mm Hg    pO2, Temperature Corrected  83 - 108 mm Hg   Procalcitonin   Result Value Ref Range    Procalcitonin <0.05 <=0.25 ng/mL   Basic Metabolic Panel   Result Value Ref Range    Glucose 133 (H) 74 - 98 mg/dL    BUN 10 7 - 20 mg/dL    Creatinine 0.60 0.60 - 1.30 mg/dL    Sodium 140 137 - 145 mmol/L    Potassium 3.9 3.5 - 5.1 mmol/L    Chloride 108 (H) 98 - 107 mmol/L    CO2 24.0 (L) 26.0 - 30.0 mmol/L    Calcium 9.2 8.4 - 10.2 mg/dL    eGFR Non African Amer 102 >60 mL/min/1.73    BUN/Creatinine Ratio 16.7 7.1 - 23.5    Anion Gap 11.9 10.0 - 20.0 mmol/L   CBC Auto Differential   Result Value Ref Range    WBC 8.66 4.80 - 10.80 10*3/mm3    RBC 4.28 4.20 - 5.40 10*6/mm3    Hemoglobin 12.9 12.0 - 16.0 g/dL    Hematocrit 38.2 37.0 - 47.0 %    MCV 89.3 81.0 - 99.0 fL    MCH 30.1 27.0 - 31.0 pg    MCHC 33.8 30.0 - 37.0 g/dL    RDW 13.0 11.5 - 14.5 %    RDW-SD 42.5 37.0 - 54.0 fl    MPV 10.0 6.0 - 12.0 fL    Platelets 248 130 - 400 10*3/mm3    Neutrophil % 87.1 (H) 37.0 - 80.0 %    Lymphocyte % 10.0 10.0 - 50.0 %    Monocyte % 2.4 0.0 - 12.0 %    Eosinophil % 0.0 0.0 - 7.0 %     Basophil % 0.0 0.0 - 2.5 %    Immature Grans % 0.5 0.0 - 0.6 %    Neutrophils, Absolute 7.54 (H) 2.00 - 6.90 10*3/mm3    Lymphocytes, Absolute 0.87 0.60 - 3.40 10*3/mm3    Monocytes, Absolute 0.21 0.00 - 0.90 10*3/mm3    Eosinophils, Absolute 0.00 0.00 - 0.70 10*3/mm3    Basophils, Absolute 0.00 0.00 - 0.20 10*3/mm3    Immature Grans, Absolute 0.04 0.00 - 0.06 10*3/mm3    nRBC 0.0 0.0 - 0.0 /100 WBC   Procalcitonin   Result Value Ref Range    Procalcitonin <0.05 <=0.25 ng/mL   TSH   Result Value Ref Range    TSH 0.219 (L) 0.470 - 4.680 mIU/mL   Basic Metabolic Panel   Result Value Ref Range    Glucose 116 (H) 74 - 98 mg/dL    BUN 14 7 - 20 mg/dL    Creatinine 0.70 0.60 - 1.30 mg/dL    Sodium 139 137 - 145 mmol/L    Potassium 3.9 3.5 - 5.1 mmol/L    Chloride 107 98 - 107 mmol/L    CO2 24.0 (L) 26.0 - 30.0 mmol/L    Calcium 9.1 8.4 - 10.2 mg/dL    eGFR Non African Amer 85 >60 mL/min/1.73    BUN/Creatinine Ratio 20.0 7.1 - 23.5    Anion Gap 11.9 10.0 - 20.0 mmol/L   CBC Auto Differential   Result Value Ref Range    WBC 8.80 4.80 - 10.80 10*3/mm3    RBC 4.19 (L) 4.20 - 5.40 10*6/mm3    Hemoglobin 12.5 12.0 - 16.0 g/dL    Hematocrit 37.2 37.0 - 47.0 %    MCV 88.8 81.0 - 99.0 fL    MCH 29.8 27.0 - 31.0 pg    MCHC 33.6 30.0 - 37.0 g/dL    RDW 13.1 11.5 - 14.5 %    RDW-SD 42.3 37.0 - 54.0 fl    MPV 10.1 6.0 - 12.0 fL    Platelets 270 130 - 400 10*3/mm3    Neutrophil % 82.6 (H) 37.0 - 80.0 %    Lymphocyte % 13.0 10.0 - 50.0 %    Monocyte % 3.0 0.0 - 12.0 %    Eosinophil % 0.0 0.0 - 7.0 %    Basophil % 0.1 0.0 - 2.5 %    Immature Grans % 1.3 (H) 0.0 - 0.6 %    Neutrophils, Absolute 7.28 (H) 2.00 - 6.90 10*3/mm3    Lymphocytes, Absolute 1.14 0.60 - 3.40 10*3/mm3    Monocytes, Absolute 0.26 0.00 - 0.90 10*3/mm3    Eosinophils, Absolute 0.00 0.00 - 0.70 10*3/mm3    Basophils, Absolute 0.01 0.00 - 0.20 10*3/mm3    Immature Grans, Absolute 0.11 (H) 0.00 - 0.06 10*3/mm3    nRBC 0.0 0.0 - 0.0 /100 WBC   T4, Free   Result Value  Ref Range    Free T4 1.12 0.78 - 2.19 ng/dL   Light Blue Top   Result Value Ref Range    Extra Tube hold for add-on    Lavender Top   Result Value Ref Range    Extra Tube hold for add-on    Gold Top - SST   Result Value Ref Range    Extra Tube Hold for add-ons.    Green Top (No Gel)   Result Value Ref Range    Extra Tube Hold for add-ons.      ECHO: 9/2016  · All left ventricular wall segments contract normally.  · Left ventricular function is normal.  · Mild tricuspid valve regurgitation is present.  · Mild pulmonic valve regurgitation is present.  · Mild mitral valve regurgitation is present      Diagnosis:   1. Paroxysmal atrial fibrillation  2. Hypertension Uncontrolled  Assessment & Plan:   1. Paroxymal Atrial fibrillation:  Maintaining NSR on Sotalol. Anticoagulated with Xarelto.   Okay to hold Xarelto 2-3 days prior to full mouth extraction  QTc okay. Continue Sotalol.     2. HTN -- BP stable.   Continue current meds    3. MVP, follows with Dr. Davidson  Will order echo  5. NEIL, appears to be chronic venous stasis. Will get echo.    compression stockings.       Electronically signed by ABIMBOLA Avalos, 04/29/19, 9:11 AM.

## 2019-05-13 ENCOUNTER — HOSPITAL ENCOUNTER (OUTPATIENT)
Dept: CARDIOLOGY | Facility: HOSPITAL | Age: 59
Discharge: HOME | End: 2019-05-13
Admitting: NURSE PRACTITIONER

## 2019-05-13 VITALS
BODY MASS INDEX: 24.62 KG/M2 | DIASTOLIC BLOOD PRESSURE: 80 MMHG | WEIGHT: 171.96 LBS | HEIGHT: 70 IN | SYSTOLIC BLOOD PRESSURE: 122 MMHG

## 2019-05-13 DIAGNOSIS — I34.1 MITRAL VALVE PROLAPSE: ICD-10-CM

## 2019-05-13 LAB
BH CV ECHO MEAS - AO ROOT AREA (BSA CORRECTED): 1.9
BH CV ECHO MEAS - AO ROOT AREA: 10.8 CM^2
BH CV ECHO MEAS - AO ROOT DIAM: 3.7 CM
BH CV ECHO MEAS - BSA(HAYCOCK): 2 M^2
BH CV ECHO MEAS - BSA: 2 M^2
BH CV ECHO MEAS - BZI_BMI: 24.8 KILOGRAMS/M^2
BH CV ECHO MEAS - BZI_METRIC_HEIGHT: 177.8 CM
BH CV ECHO MEAS - BZI_METRIC_WEIGHT: 78.5 KG
BH CV ECHO MEAS - EDV(CUBED): 79.5 ML
BH CV ECHO MEAS - EDV(MOD-SP2): 64 ML
BH CV ECHO MEAS - EDV(MOD-SP4): 71 ML
BH CV ECHO MEAS - EDV(TEICH): 83.1 ML
BH CV ECHO MEAS - EF(CUBED): 74.4 %
BH CV ECHO MEAS - EF(MOD-BP): 60 %
BH CV ECHO MEAS - EF(MOD-SP2): 56.3 %
BH CV ECHO MEAS - EF(MOD-SP4): 63.4 %
BH CV ECHO MEAS - EF(TEICH): 66.6 %
BH CV ECHO MEAS - ESV(CUBED): 20.3 ML
BH CV ECHO MEAS - ESV(MOD-SP2): 28 ML
BH CV ECHO MEAS - ESV(MOD-SP4): 26 ML
BH CV ECHO MEAS - ESV(TEICH): 27.8 ML
BH CV ECHO MEAS - FS: 36.5 %
BH CV ECHO MEAS - IVS/LVPW: 1.1
BH CV ECHO MEAS - IVSD: 0.81 CM
BH CV ECHO MEAS - LA DIMENSION: 3.1 CM
BH CV ECHO MEAS - LA/AO: 0.84
BH CV ECHO MEAS - LAD MAJOR: 4.1 CM
BH CV ECHO MEAS - LAT PEAK E' VEL: 10.4 CM/SEC
BH CV ECHO MEAS - LATERAL E/E' RATIO: 7
BH CV ECHO MEAS - LV DIASTOLIC VOL/BSA (35-75): 36.2 ML/M^2
BH CV ECHO MEAS - LV MASS(C)D: 101.5 GRAMS
BH CV ECHO MEAS - LV MASS(C)DI: 51.7 GRAMS/M^2
BH CV ECHO MEAS - LV MAX PG: 3.1 MMHG
BH CV ECHO MEAS - LV MEAN PG: 1 MMHG
BH CV ECHO MEAS - LV SYSTOLIC VOL/BSA (12-30): 13.2 ML/M^2
BH CV ECHO MEAS - LV V1 MAX: 88.7 CM/SEC
BH CV ECHO MEAS - LV V1 MEAN: 54.3 CM/SEC
BH CV ECHO MEAS - LV V1 VTI: 19.1 CM
BH CV ECHO MEAS - LVIDD: 4.3 CM
BH CV ECHO MEAS - LVIDS: 2.7 CM
BH CV ECHO MEAS - LVLD AP2: 7.3 CM
BH CV ECHO MEAS - LVLD AP4: 7.6 CM
BH CV ECHO MEAS - LVLS AP2: 5.9 CM
BH CV ECHO MEAS - LVLS AP4: 6.1 CM
BH CV ECHO MEAS - LVOT AREA (M): 3.5 CM^2
BH CV ECHO MEAS - LVOT AREA: 3.5 CM^2
BH CV ECHO MEAS - LVOT DIAM: 2.1 CM
BH CV ECHO MEAS - LVPWD: 0.75 CM
BH CV ECHO MEAS - MED PEAK E' VEL: 8.6 CM/SEC
BH CV ECHO MEAS - MEDIAL E/E' RATIO: 8.4
BH CV ECHO MEAS - MV A MAX VEL: 69.1 CM/SEC
BH CV ECHO MEAS - MV DEC SLOPE: 376 CM/SEC^2
BH CV ECHO MEAS - MV DEC TIME: 0.27 SEC
BH CV ECHO MEAS - MV E MAX VEL: 72.6 CM/SEC
BH CV ECHO MEAS - MV E/A: 1.1
BH CV ECHO MEAS - MV P1/2T MAX VEL: 83.6 CM/SEC
BH CV ECHO MEAS - MV P1/2T: 65.1 MSEC
BH CV ECHO MEAS - MVA P1/2T LCG: 2.6 CM^2
BH CV ECHO MEAS - MVA(P1/2T): 3.4 CM^2
BH CV ECHO MEAS - PA ACC SLOPE: 491 CM/SEC^2
BH CV ECHO MEAS - PA ACC TIME: 0.14 SEC
BH CV ECHO MEAS - PA PR(ACCEL): 18.3 MMHG
BH CV ECHO MEAS - RAP SYSTOLE: 3 MMHG
BH CV ECHO MEAS - RVSP: 18 MMHG
BH CV ECHO MEAS - SI(CUBED): 30.1 ML/M^2
BH CV ECHO MEAS - SI(LVOT): 33.7 ML/M^2
BH CV ECHO MEAS - SI(MOD-SP2): 18.3 ML/M^2
BH CV ECHO MEAS - SI(MOD-SP4): 22.9 ML/M^2
BH CV ECHO MEAS - SI(TEICH): 28.2 ML/M^2
BH CV ECHO MEAS - SV(CUBED): 59.2 ML
BH CV ECHO MEAS - SV(LVOT): 66.2 ML
BH CV ECHO MEAS - SV(MOD-SP2): 36 ML
BH CV ECHO MEAS - SV(MOD-SP4): 45 ML
BH CV ECHO MEAS - SV(TEICH): 55.3 ML
BH CV ECHO MEAS - TR MAX PG: 15 MMHG
BH CV ECHO MEAS - TR MAX VEL: 194 CM/SEC
BH CV ECHO MEASUREMENTS AVERAGE E/E' RATIO: 7.64
BH CV VAS BP LEFT ARM: NORMAL MMHG
BH CV XLRA - RV BASE: 3.6 CM
BH CV XLRA - RV LENGTH: 6.1 CM
BH CV XLRA - RV MID: 2.4 CM
LEFT ATRIUM VOLUME INDEX: 15.8 ML/M^2
LEFT ATRIUM VOLUME: 31 ML
LV EF 2D ECHO EST: 60 %

## 2019-05-13 PROCEDURE — 93306 TTE W/DOPPLER COMPLETE: CPT | Performed by: INTERNAL MEDICINE

## 2019-05-13 PROCEDURE — 93306 TTE W/DOPPLER COMPLETE: CPT

## 2019-05-14 ENCOUNTER — TELEPHONE (OUTPATIENT)
Dept: CARDIOLOGY | Facility: CLINIC | Age: 59
End: 2019-05-14

## 2019-05-14 NOTE — TELEPHONE ENCOUNTER
Radha Apodaca, Silvia Gunn, RN             Please let patient know echo looks good            I tried to call the patient. No answer. I left a message.

## 2019-08-12 ENCOUNTER — OFFICE VISIT (OUTPATIENT)
Dept: CARDIOLOGY | Facility: CLINIC | Age: 59
End: 2019-08-12

## 2019-08-12 VITALS
HEART RATE: 69 BPM | HEIGHT: 70 IN | BODY MASS INDEX: 25.97 KG/M2 | SYSTOLIC BLOOD PRESSURE: 122 MMHG | WEIGHT: 181.4 LBS | OXYGEN SATURATION: 99 % | DIASTOLIC BLOOD PRESSURE: 74 MMHG

## 2019-08-12 DIAGNOSIS — I48.0 PAROXYSMAL ATRIAL FIBRILLATION (HCC): ICD-10-CM

## 2019-08-12 DIAGNOSIS — I34.0 NON-RHEUMATIC MITRAL REGURGITATION: Primary | ICD-10-CM

## 2019-08-12 DIAGNOSIS — I10 ESSENTIAL HYPERTENSION: ICD-10-CM

## 2019-08-12 PROCEDURE — 99213 OFFICE O/P EST LOW 20 MIN: CPT | Performed by: INTERNAL MEDICINE

## 2019-08-12 PROCEDURE — 93000 ELECTROCARDIOGRAM COMPLETE: CPT | Performed by: INTERNAL MEDICINE

## 2019-08-12 NOTE — PROGRESS NOTES
[facility] AT [address], [address]  [place], [place], [address] [phone]  WWW.[facility]           OUTPATIENT CLINIC FOLLOW UP NOTE    Patient Care Team:  Patient Care Team:  Jluis Licea DO as PCP - General (Internal Medicine)    Subjective:   Reason for consultation:   Chief Complaint   Patient presents with   • PAF       HPI:    Cristela Stratton is a 59 y.o. female.  The patient has a history of mitral valve prolapse, mild mitral regurgitation, paroxysmal atrial fibrillation on sotalol for rhythm control, hypertension.  She presents for follow-up.    Since patient was last seen she reports that she has been doing well from a cardiac standpoint.  She denies any chest pain, shortness of breath, palpitations, lightheadedness, syncope, orthopnea, or PND.  She denies any known recurrence of atrial fibrillation.  Reports that she stopped smoking 11 months ago.    Review of Systems:  Negative for exertional chest pain, dyspnea with exertion, orthopnea, PND, palpitations, lightheadedness, syncope.     PFSH:  Patient Active Problem List   Diagnosis   • Tobacco abuse   • Essential hypertension   • Bronchitis   • Atrial fibrillation (CMS/HCC)   • Non-rheumatic mitral regurgitation   • COPD (chronic obstructive pulmonary disease) (CMS/HCC)   • Subclinical hyperthyroidism   • Lower extremity edema         Current Outpatient Medications:   •  acetaminophen (TYLENOL) 500 MG tablet, Take 500 mg by mouth Every 6 (Six) Hours As Needed for mild pain (1-3)., Disp: , Rfl:   •  amLODIPine (NORVASC) 10 MG tablet, Take 1 tablet by mouth Daily., Disp: 90 tablet, Rfl: 3  •  hydrochlorothiazide (HYDRODIURIL) 12.5 MG tablet, TAKE ONE TABLET BY MOUTH DAILY, Disp: 30 tablet, Rfl: 4  •  rivaroxaban (XARELTO) 20 MG tablet, Take 1 tablet by mouth Daily With Dinner., Disp: 90 tablet, Rfl: 2  •  sotalol (BETAPACE) 80 MG tablet, TAKE 1 TABLET BY MOUTH TWICE DAILY, Disp: 60 tablet, Rfl:  "11    No Known Allergies     reports that she quit smoking about 11 months ago. Her smoking use included cigarettes. She has a 22.00 pack-year smoking history. She has never used smokeless tobacco.    Family History   Problem Relation Age of Onset   • Cancer Mother    • Cancer Maternal Aunt    • Alcohol abuse Maternal Grandfather    • Heart disease Maternal Grandfather        Objective:   Blood pressure 122/74, pulse 69, height 177.8 cm (70\"), weight 82.3 kg (181 lb 6.4 oz), SpO2 99 %.  CONSTITUTIONAL: No acute distress, normal affect  RESPIRATORY: Normal effort. Clear to auscultation bilaterally without wheezing or rales  CARDIOVASCULAR: Carotids with normal upstrokes without bruits.  Regular rate and rhythm with normal S1 and S2. Without murmur, gallop or rub.  PERIPHERAL VASCULAR: Normal radial pulses bilaterally.  There is no peripheral edema bilaterally.     Labs:  Lab Results   Component Value Date    ALT 14 09/07/2018    AST 20 09/07/2018     Lab Results   Component Value Date    CREATININE 0.70 09/09/2018       Diagnostic Data:      ECG 12 Lead  Date/Time: 8/12/2019 1:36 PM  Performed by: Adarsh Davidson MD  Authorized by: Adarsh Davidson MD   Comparison: compared with previous ECG from 4/29/2019  Similar to previous ECG  Rhythm: sinus rhythm  Rate: normal  BPM: 71  Comments:  ms  QTc 465 ms          14-day Zio Patch 9/2018  -No A. fib, longest SVT 11 beats.  -Patient triggered events were all normal sinus rhythm    TTE 5/2019  · Left ventricular systolic function is normal. Estimated EF = 60%.  · Left ventricular diastolic function is normal.  · There is no evidence of mitral valve prolapse. Only trace mitral regurgitation is present.    TTE 9/2016  · All left ventricular wall segments contract normally.  · Left ventricular function is normal.  · Mild tricuspid valve regurgitation is present.  · Mild pulmonic valve regurgitation is present.  · Mild mitral valve regurgitation is present    Assessment " and Plan:   Cristela was seen today for hypertension.    Diagnoses and all orders for this visit:    Paroxysmal atrial fibrillation  -14-day Zio Patch 9/2018 without evidence of recurrent atrial fibrillation, SVT noted with longest being 11 beats.  -In normal sinus rhythm today QRS/QTc stable  -Continue sotalol and Xarelto  -Patient to follow-up with Dr. Rivas in 11/2019.    Non-rheumatic mitral regurgitation  -Trace mitral regurgitation noted on 5/2019 per TTE, no evidence of MVP.  -Asymptomatic    Essential hypertension  -At goal, continue current related medications.    Tobacco dependence  -Patient reports smoking cessation approximately 11 months ago.    - Return if symptoms worsen or fail to improve.    Scribed for Adarsh Davidson MD by Fadumo Andrade, ABIMBOLA   8/12/2019  1:57 PM    I, Adarsh Davidson MD, personally performed the services as scribed by the above named individual. I have made any necessary edits and it is both accurate and complete.     Adarsh Davidson MD, MSc, FACC  Interventional Cardiology  [facility] at [facility]

## 2019-09-18 ENCOUNTER — TELEPHONE (OUTPATIENT)
Dept: CARDIOLOGY | Facility: CLINIC | Age: 59
End: 2019-09-18

## 2019-09-18 NOTE — TELEPHONE ENCOUNTER
Patient reports having indigestion and acid reflux. Would like medication recommendations for this. Pt advised to discuss with PCP. Pt agreeable to plan.

## 2019-10-10 DIAGNOSIS — I10 ESSENTIAL HYPERTENSION: ICD-10-CM

## 2019-10-10 RX ORDER — HYDROCHLOROTHIAZIDE 12.5 MG/1
TABLET ORAL
Qty: 30 TABLET | Refills: 3 | Status: SHIPPED | OUTPATIENT
Start: 2019-10-10 | End: 2020-02-20

## 2019-11-01 ENCOUNTER — OFFICE VISIT (OUTPATIENT)
Dept: INTERNAL MEDICINE | Facility: CLINIC | Age: 59
End: 2019-11-01

## 2019-11-01 VITALS
HEART RATE: 76 BPM | WEIGHT: 177 LBS | TEMPERATURE: 97.7 F | SYSTOLIC BLOOD PRESSURE: 140 MMHG | DIASTOLIC BLOOD PRESSURE: 84 MMHG | OXYGEN SATURATION: 100 % | BODY MASS INDEX: 25.34 KG/M2 | HEIGHT: 70 IN

## 2019-11-01 DIAGNOSIS — F51.02 ADJUSTMENT INSOMNIA: Primary | ICD-10-CM

## 2019-11-01 DIAGNOSIS — R09.81 SINUS CONGESTION: ICD-10-CM

## 2019-11-01 DIAGNOSIS — L98.9 SKIN LESION OF CHEST WALL: ICD-10-CM

## 2019-11-01 DIAGNOSIS — J30.2 SEASONAL ALLERGIES: ICD-10-CM

## 2019-11-01 DIAGNOSIS — L30.0 NUMMULAR ECZEMA: ICD-10-CM

## 2019-11-01 PROCEDURE — 99214 OFFICE O/P EST MOD 30 MIN: CPT | Performed by: INTERNAL MEDICINE

## 2019-11-01 RX ORDER — BETAMETHASONE DIPROPIONATE 0.5 MG/G
CREAM TOPICAL 2 TIMES DAILY
Qty: 15 G | Refills: 0 | Status: SHIPPED | OUTPATIENT
Start: 2019-11-01 | End: 2020-04-28

## 2019-11-01 RX ORDER — FLUTICASONE PROPIONATE 50 MCG
2 SPRAY, SUSPENSION (ML) NASAL DAILY
Qty: 18.2 G | Refills: 2 | Status: SHIPPED | OUTPATIENT
Start: 2019-11-01 | End: 2020-01-02

## 2019-11-01 RX ORDER — CHOLECALCIFEROL (VITAMIN D3) 125 MCG
5 CAPSULE ORAL NIGHTLY PRN
Qty: 30 EACH | Refills: 2 | Status: SHIPPED | OUTPATIENT
Start: 2019-11-01 | End: 2020-01-02

## 2019-11-01 RX ORDER — CETIRIZINE HYDROCHLORIDE 10 MG/1
10 TABLET ORAL DAILY
Qty: 30 TABLET | Refills: 2 | Status: SHIPPED | OUTPATIENT
Start: 2019-11-01 | End: 2020-01-08

## 2019-11-01 NOTE — PATIENT INSTRUCTIONS
Dizziness  Dizziness is a common problem. It is a feeling of unsteadiness or light-headedness. You may feel like you are about to faint. Dizziness can lead to injury if you stumble or fall. Anyone can become dizzy, but dizziness is more common in older adults. This condition can be caused by a number of things, including medicines, dehydration, or illness.  Follow these instructions at home:  Eating and drinking  · Drink enough fluid to keep your urine clear or pale yellow. This helps to keep you from becoming dehydrated. Try to drink more clear fluids, such as water.  · Do not drink alcohol.  · Limit your caffeine intake if told to do so by your health care provider. Check ingredients and nutrition facts to see if a food or beverage contains caffeine.  · Limit your salt (sodium) intake if told to do so by your health care provider. Check ingredients and nutrition facts to see if a food or beverage contains sodium.  Activity  · Avoid making quick movements.  ? Rise slowly from chairs and steady yourself until you feel okay.  ? In the morning, first sit up on the side of the bed. When you feel okay, stand slowly while you hold onto something until you know that your balance is fine.  · If you need to  one place for a long time, move your legs often. Tighten and relax the muscles in your legs while you are standing.  · Do not drive or use heavy machinery if you feel dizzy.  · Avoid bending down if you feel dizzy. Place items in your home so that they are easy for you to reach without leaning over.  Lifestyle  · Do not use any products that contain nicotine or tobacco, such as cigarettes and e-cigarettes. If you need help quitting, ask your health care provider.  · Try to reduce your stress level by using methods such as yoga or meditation. Talk with your health care provider if you need help to manage your stress.  General instructions  · Watch your dizziness for any changes.  · Take over-the-counter and  prescription medicines only as told by your health care provider. Talk with your health care provider if you think that your dizziness is caused by a medicine that you are taking.  · Tell a friend or a family member that you are feeling dizzy. If he or she notices any changes in your behavior, have this person call your health care provider.  · Keep all follow-up visits as told by your health care provider. This is important.  Contact a health care provider if:  · Your dizziness does not go away.  · Your dizziness or light-headedness gets worse.  · You feel nauseous.  · You have reduced hearing.  · You have new symptoms.  · You are unsteady on your feet or you feel like the room is spinning.  Get help right away if:  · You vomit or have diarrhea and are unable to eat or drink anything.  · You have problems talking, walking, swallowing, or using your arms, hands, or legs.  · You feel generally weak.  · You are not thinking clearly or you have trouble forming sentences. It may take a friend or family member to notice this.  · You have chest pain, abdominal pain, shortness of breath, or sweating.  · Your vision changes.  · You have any bleeding.  · You have a severe headache.  · You have neck pain or a stiff neck.  · You have a fever.  These symptoms may represent a serious problem that is an emergency. Do not wait to see if the symptoms will go away. Get medical help right away. Call your local emergency services (911 in the [place] Do not drive yourself to the hospital.  Summary  · Dizziness is a feeling of unsteadiness or light-headedness. This condition can be caused by a number of things, including medicines, dehydration, or illness.  · Anyone can become dizzy, but dizziness is more common in older adults.  · Drink enough fluid to keep your urine clear or pale yellow. Do not drink alcohol.  · Avoid making quick movements if you feel dizzy. Monitor your dizziness for any changes.  This information is not intended to  replace advice given to you by your health care provider. Make sure you discuss any questions you have with your health care provider.  Document Released: 06/13/2002 Document Revised: 01/20/2018 Document Reviewed: 01/20/2018  [employer] Patient Education © 2019 [employer]

## 2019-11-01 NOTE — PROGRESS NOTES
Chief Complaint   Patient presents with   • Skin Lesion     right upper chest with itching   • Earache     left ear pain, light headed and nausea       Subjective     History of Present Illness   Cristela Stratton is a 59 y.o. female presenting for follow up. Patient shares concerns about a pruritic small nodular patch on the right anterior chest.  Has been present for several months, doesn't seem to be growing.     Has also been suffering with lightheadedness. She has a poor schedule with working 3rd shift, drinks about 2L or more of fountain sodas per day and a coffee or so per day.  She has been doing this for years.      The following portions of the patient's history were reviewed and updated as appropriate: allergies, current medications, past family history, past medical history, past social history, past surgical history and problem list.    Review of Systems   Constitutional: Negative for chills, fatigue and fever.   HENT: Negative for congestion, ear pain, rhinorrhea, sinus pressure and sore throat.    Eyes: Negative for visual disturbance.   Respiratory: Negative for cough, chest tightness, shortness of breath and wheezing.    Cardiovascular: Negative for chest pain, palpitations and leg swelling.   Gastrointestinal: Negative for abdominal pain, blood in stool, constipation, diarrhea, nausea and vomiting.   Endocrine: Negative for polydipsia and polyuria.   Genitourinary: Negative for dysuria and hematuria.   Musculoskeletal: Negative for arthralgias and back pain.   Skin: Negative for rash.   Neurological: Negative for dizziness, light-headedness, numbness and headaches.   Psychiatric/Behavioral: Negative for dysphoric mood and sleep disturbance. The patient is not nervous/anxious.        No Known Allergies    Past Medical History:   Diagnosis Date   • Atrial fibrillation (CMS/[facility])     CHADS-VASC = 2   • Hypertension        Social History     Socioeconomic History   • Marital status:      Spouse  "name: Not on file   • Number of children: Not on file   • Years of education: Not on file   • Highest education level: Not on file   Tobacco Use   • Smoking status: Former Smoker     Packs/day: 1.00     Years: 22.00     Pack years: 22.00     Types: Cigarettes     Last attempt to quit: 9/10/2018     Years since quittin.1   • Smokeless tobacco: Never Used   Substance and Sexual Activity   • Alcohol use: No   • Drug use: No   • Sexual activity: No        History reviewed. No pertinent surgical history.    Family History   Problem Relation Age of Onset   • Cancer Mother    • Cancer Maternal Aunt    • Alcohol abuse Maternal Grandfather    • Heart disease Maternal Grandfather          Current Outpatient Medications:   •  acetaminophen (TYLENOL) 500 MG tablet, Take 500 mg by mouth Every 6 (Six) Hours As Needed for mild pain (1-3)., Disp: , Rfl:   •  amLODIPine (NORVASC) 10 MG tablet, Take 1 tablet by mouth Daily., Disp: 90 tablet, Rfl: 3  •  hydroCHLOROthiazide (HYDRODIURIL) 12.5 MG tablet, TAKE ONE TABLET BY MOUTH DAILY, Disp: 30 tablet, Rfl: 3  •  rivaroxaban (XARELTO) 20 MG tablet, Take 1 tablet by mouth Daily With Dinner., Disp: 90 tablet, Rfl: 2  •  sotalol (BETAPACE) 80 MG tablet, TAKE 1 TABLET BY MOUTH TWICE DAILY, Disp: 60 tablet, Rfl: 11  •  betamethasone dipropionate (DIPROLENE) 0.05 % cream, Apply  topically to the appropriate area as directed 2 (Two) Times a Day., Disp: 15 g, Rfl: 0  •  cetirizine (zyrTEC) 10 MG tablet, Take 1 tablet by mouth Daily., Disp: 30 tablet, Rfl: 2  •  fluticasone (FLONASE) 50 MCG/ACT nasal spray, 2 sprays into the nostril(s) as directed by provider Daily., Disp: 18.2 g, Rfl: 2  •  melatonin 5 MG tablet tablet, Take 1 tablet by mouth At Night As Needed (sleep)., Disp: 30 each, Rfl: 2    Objective   /84   Pulse 76   Temp 97.7 °F (36.5 °C)   Ht 177.8 cm (70\")   Wt 80.3 kg (177 lb)   LMP  (LMP Unknown)   SpO2 100%   BMI 25.40 kg/m²     Physical Exam   Constitutional: She " is oriented to person, place, and time. She appears well-developed and well-nourished.   HENT:   Head: Normocephalic and atraumatic.   Eyes: Conjunctivae are normal.   Neck: Normal range of motion. Neck supple.   Pulmonary/Chest: Effort normal.   Musculoskeletal: Normal range of motion.   Neurological: She is alert and oriented to person, place, and time.   Skin: Rash (1cm oval erythematous patch raised, irregular appearing) noted.   Psychiatric: She has a normal mood and affect. Her behavior is normal.   Nursing note and vitals reviewed.      Assessment/Plan   Cristela was seen today for skin lesion and earache.    Diagnoses and all orders for this visit:    Adjustment insomnia  -     melatonin 5 MG tablet tablet; Take 1 tablet by mouth At Night As Needed (sleep).    Nummular eczema  -     betamethasone dipropionate (DIPROLENE) 0.05 % cream; Apply  topically to the appropriate area as directed 2 (Two) Times a Day.    Skin lesion of chest wall  -     Ambulatory Referral to Dermatology    Seasonal allergies  -     fluticasone (FLONASE) 50 MCG/ACT nasal spray; 2 sprays into the nostril(s) as directed by provider Daily.  -     cetirizine (zyrTEC) 10 MG tablet; Take 1 tablet by mouth Daily.    Sinus congestion  -     fluticasone (FLONASE) 50 MCG/ACT nasal spray; 2 sprays into the nostril(s) as directed by provider Daily.  -     cetirizine (zyrTEC) 10 MG tablet; Take 1 tablet by mouth Daily.          Discussion Summary:  Patient is a 59 y.o. female presenting for follow up.    Rash  - Appears to be nummular eczema versus possible basal cell carcinoma.  Patient may benefit from excisional biopsy with dermatology.  Referral was placed.  Patient was also started on steroid cream to see if this would help with itching symptoms.    Sinus congestion/allergic rhinitis  - Start Flonase and Zyrtec.    Adjustment insomnia  -Patient works third shift and has irregular schedule and as a result her sleep quality has been poor.  - Try  melatonin prior to bedtime    Follow up:  Return in about 4 months (around 3/1/2020) for Next scheduled follow up.

## 2019-12-02 DIAGNOSIS — I10 ESSENTIAL HYPERTENSION: ICD-10-CM

## 2019-12-04 RX ORDER — AMLODIPINE BESYLATE 10 MG/1
TABLET ORAL
Qty: 30 TABLET | Refills: 0 | Status: SHIPPED | OUTPATIENT
Start: 2019-12-04 | End: 2020-01-02

## 2019-12-05 ENCOUNTER — TELEPHONE (OUTPATIENT)
Dept: INTERNAL MEDICINE | Facility: CLINIC | Age: 59
End: 2019-12-05

## 2019-12-05 NOTE — TELEPHONE ENCOUNTER
Patient has questions about insurance paperwork that was to be faxed, she'd like an update when possible.

## 2019-12-19 ENCOUNTER — TELEPHONE (OUTPATIENT)
Dept: INTERNAL MEDICINE | Facility: CLINIC | Age: 59
End: 2019-12-19

## 2019-12-19 NOTE — TELEPHONE ENCOUNTER
Pt called asking if she can get a prescription/ refill  for Symbicort he called in for her when she got out of the hospital when she had pneumonia    Confirmed Corine

## 2019-12-19 NOTE — TELEPHONE ENCOUNTER
Spoke with pt and she says she is having some wheezing and that is why she would like the Symbicort.  She states that she has finally used up all the inhalers that she had gotten from amanda she was in the hospital last year.  Symbicort seemed to help her the most.

## 2019-12-20 RX ORDER — BUDESONIDE AND FORMOTEROL FUMARATE DIHYDRATE 80; 4.5 UG/1; UG/1
2 AEROSOL RESPIRATORY (INHALATION) 2 TIMES DAILY PRN
Qty: 1 INHALER | Refills: 12 | Status: SHIPPED | OUTPATIENT
Start: 2019-12-20 | End: 2020-01-02

## 2019-12-23 ENCOUNTER — PRIOR AUTHORIZATION (OUTPATIENT)
Dept: INTERNAL MEDICINE | Facility: CLINIC | Age: 59
End: 2019-12-23

## 2019-12-28 ENCOUNTER — APPOINTMENT (OUTPATIENT)
Dept: GENERAL RADIOLOGY | Facility: HOSPITAL | Age: 59
End: 2019-12-28

## 2019-12-28 ENCOUNTER — HOSPITAL ENCOUNTER (EMERGENCY)
Facility: HOSPITAL | Age: 59
Discharge: HOME | End: 2019-12-28
Attending: EMERGENCY MEDICINE | Admitting: EMERGENCY MEDICINE

## 2019-12-28 VITALS
TEMPERATURE: 98.7 F | BODY MASS INDEX: 26.59 KG/M2 | OXYGEN SATURATION: 95 % | DIASTOLIC BLOOD PRESSURE: 77 MMHG | HEIGHT: 67 IN | HEART RATE: 71 BPM | SYSTOLIC BLOOD PRESSURE: 128 MMHG | RESPIRATION RATE: 16 BRPM | WEIGHT: 169.4 LBS

## 2019-12-28 DIAGNOSIS — E87.6 HYPOKALEMIA: ICD-10-CM

## 2019-12-28 DIAGNOSIS — J20.9 ACUTE BRONCHITIS, UNSPECIFIED ORGANISM: Primary | ICD-10-CM

## 2019-12-28 LAB
ALBUMIN SERPL-MCNC: 3.7 G/DL (ref 3.5–5.2)
ALBUMIN/GLOB SERPL: 0.9 G/DL
ALP SERPL-CCNC: 76 U/L (ref 39–117)
ALT SERPL W P-5'-P-CCNC: 9 U/L (ref 1–33)
ANION GAP SERPL CALCULATED.3IONS-SCNC: 14.2 MMOL/L (ref 5–15)
AST SERPL-CCNC: 17 U/L (ref 1–32)
BILIRUB SERPL-MCNC: 0.6 MG/DL (ref 0.2–1.2)
BUN BLD-MCNC: 6 MG/DL (ref 8–23)
BUN/CREAT SERPL: 7.6 (ref 7–25)
CALCIUM SPEC-SCNC: 8.8 MG/DL (ref 8.6–10.5)
CHLORIDE SERPL-SCNC: 88 MMOL/L (ref 98–107)
CO2 SERPL-SCNC: 28.8 MMOL/L (ref 22–29)
CREAT BLD-MCNC: 0.79 MG/DL (ref 0.57–1)
D DIMER PPP FEU-MCNC: 0.26 MCGFEU/ML (ref 0–0.57)
DEPRECATED RDW RBC AUTO: 41.4 FL (ref 37–54)
EOSINOPHIL # BLD MANUAL: 0.07 10*3/MM3 (ref 0–0.4)
EOSINOPHIL NFR BLD MANUAL: 2 % (ref 0.3–6.2)
ERYTHROCYTE [DISTWIDTH] IN BLOOD BY AUTOMATED COUNT: 12.6 % (ref 12.3–15.4)
FLUAV AG NPH QL: NEGATIVE
FLUBV AG NPH QL IA: NEGATIVE
GFR SERPL CREATININE-BSD FRML MDRD: 74 ML/MIN/1.73
GLOBULIN UR ELPH-MCNC: 3.9 GM/DL
GLUCOSE BLD-MCNC: 114 MG/DL (ref 65–99)
HCT VFR BLD AUTO: 38.7 % (ref 34–46.6)
HGB BLD-MCNC: 13.3 G/DL (ref 12–15.9)
LYMPHOCYTES # BLD MANUAL: 1.3 10*3/MM3 (ref 0.7–3.1)
LYMPHOCYTES NFR BLD MANUAL: 19 % (ref 5–12)
LYMPHOCYTES NFR BLD MANUAL: 39 % (ref 19.6–45.3)
MAGNESIUM SERPL-MCNC: 2 MG/DL (ref 1.6–2.4)
MCH RBC QN AUTO: 30.4 PG (ref 26.6–33)
MCHC RBC AUTO-ENTMCNC: 34.4 G/DL (ref 31.5–35.7)
MCV RBC AUTO: 88.6 FL (ref 79–97)
MONOCYTES # BLD AUTO: 0.63 10*3/MM3 (ref 0.1–0.9)
NEUTROPHILS # BLD AUTO: 1.13 10*3/MM3 (ref 1.7–7)
NEUTROPHILS NFR BLD MANUAL: 33 % (ref 42.7–76)
NEUTS BAND NFR BLD MANUAL: 1 % (ref 0–5)
NRBC SPEC MANUAL: 2 /100 WBC (ref 0–0.2)
PLATELET # BLD AUTO: 160 10*3/MM3 (ref 140–450)
PMV BLD AUTO: 9.5 FL (ref 6–12)
POTASSIUM BLD-SCNC: 2.6 MMOL/L (ref 3.5–5.2)
PROT SERPL-MCNC: 7.6 G/DL (ref 6–8.5)
RBC # BLD AUTO: 4.37 10*6/MM3 (ref 3.77–5.28)
RBC MORPH BLD: NORMAL
SCAN SLIDE: NORMAL
SMALL PLATELETS BLD QL SMEAR: ADEQUATE
SODIUM BLD-SCNC: 131 MMOL/L (ref 136–145)
TROPONIN T SERPL-MCNC: <0.01 NG/ML (ref 0–0.03)
VARIANT LYMPHS NFR BLD MANUAL: 6 % (ref 0–5)
WBC MORPH BLD: NORMAL
WBC NRBC COR # BLD: 3.33 10*3/MM3 (ref 3.4–10.8)

## 2019-12-28 PROCEDURE — 96375 TX/PRO/DX INJ NEW DRUG ADDON: CPT

## 2019-12-28 PROCEDURE — 85025 COMPLETE CBC W/AUTO DIFF WBC: CPT | Performed by: PHYSICIAN ASSISTANT

## 2019-12-28 PROCEDURE — 84484 ASSAY OF TROPONIN QUANT: CPT | Performed by: PHYSICIAN ASSISTANT

## 2019-12-28 PROCEDURE — 63710000001 PREDNISONE PER 1 MG: Performed by: PHYSICIAN ASSISTANT

## 2019-12-28 PROCEDURE — 25010000003 POTASSIUM CHLORIDE 10 MEQ/100ML SOLUTION: Performed by: PHYSICIAN ASSISTANT

## 2019-12-28 PROCEDURE — 96365 THER/PROPH/DIAG IV INF INIT: CPT

## 2019-12-28 PROCEDURE — 85007 BL SMEAR W/DIFF WBC COUNT: CPT | Performed by: PHYSICIAN ASSISTANT

## 2019-12-28 PROCEDURE — 99284 EMERGENCY DEPT VISIT MOD MDM: CPT

## 2019-12-28 PROCEDURE — 94799 UNLISTED PULMONARY SVC/PX: CPT

## 2019-12-28 PROCEDURE — 25010000002 METHYLPREDNISOLONE PER 125 MG: Performed by: PHYSICIAN ASSISTANT

## 2019-12-28 PROCEDURE — 71046 X-RAY EXAM CHEST 2 VIEWS: CPT

## 2019-12-28 PROCEDURE — 94640 AIRWAY INHALATION TREATMENT: CPT

## 2019-12-28 PROCEDURE — 80053 COMPREHEN METABOLIC PANEL: CPT | Performed by: PHYSICIAN ASSISTANT

## 2019-12-28 PROCEDURE — 83735 ASSAY OF MAGNESIUM: CPT | Performed by: PHYSICIAN ASSISTANT

## 2019-12-28 PROCEDURE — 87804 INFLUENZA ASSAY W/OPTIC: CPT | Performed by: PHYSICIAN ASSISTANT

## 2019-12-28 PROCEDURE — 85379 FIBRIN DEGRADATION QUANT: CPT | Performed by: PHYSICIAN ASSISTANT

## 2019-12-28 PROCEDURE — 93005 ELECTROCARDIOGRAM TRACING: CPT | Performed by: PHYSICIAN ASSISTANT

## 2019-12-28 RX ORDER — ONDANSETRON 4 MG/1
4 TABLET, ORALLY DISINTEGRATING ORAL ONCE
Status: COMPLETED | OUTPATIENT
Start: 2019-12-28 | End: 2019-12-28

## 2019-12-28 RX ORDER — ALBUTEROL SULFATE 90 UG/1
2 AEROSOL, METERED RESPIRATORY (INHALATION) EVERY 4 HOURS PRN
Qty: 1 INHALER | Refills: 0 | Status: SHIPPED | OUTPATIENT
Start: 2019-12-28 | End: 2020-02-27 | Stop reason: SDUPTHER

## 2019-12-28 RX ORDER — PREDNISONE 20 MG/1
20 TABLET ORAL 2 TIMES DAILY
Qty: 8 TABLET | Refills: 0 | Status: SHIPPED | OUTPATIENT
Start: 2019-12-28 | End: 2020-01-08

## 2019-12-28 RX ORDER — SODIUM CHLORIDE 0.9 % (FLUSH) 0.9 %
10 SYRINGE (ML) INJECTION AS NEEDED
Status: DISCONTINUED | OUTPATIENT
Start: 2019-12-28 | End: 2019-12-28 | Stop reason: HOSPADM

## 2019-12-28 RX ORDER — POTASSIUM CHLORIDE 7.45 MG/ML
10 INJECTION INTRAVENOUS ONCE
Status: COMPLETED | OUTPATIENT
Start: 2019-12-28 | End: 2019-12-28

## 2019-12-28 RX ORDER — IPRATROPIUM BROMIDE AND ALBUTEROL SULFATE 2.5; .5 MG/3ML; MG/3ML
3 SOLUTION RESPIRATORY (INHALATION) ONCE
Status: COMPLETED | OUTPATIENT
Start: 2019-12-28 | End: 2019-12-28

## 2019-12-28 RX ORDER — PREDNISONE 20 MG/1
20 TABLET ORAL ONCE
Status: COMPLETED | OUTPATIENT
Start: 2019-12-28 | End: 2019-12-28

## 2019-12-28 RX ORDER — POTASSIUM CHLORIDE 750 MG/1
10 TABLET, FILM COATED, EXTENDED RELEASE ORAL 2 TIMES DAILY
Qty: 6 TABLET | Refills: 0 | Status: SHIPPED | OUTPATIENT
Start: 2019-12-28 | End: 2019-12-31

## 2019-12-28 RX ORDER — DOXYCYCLINE 100 MG/1
100 CAPSULE ORAL ONCE
Status: COMPLETED | OUTPATIENT
Start: 2019-12-28 | End: 2019-12-28

## 2019-12-28 RX ORDER — METHYLPREDNISOLONE SODIUM SUCCINATE 125 MG/2ML
125 INJECTION, POWDER, LYOPHILIZED, FOR SOLUTION INTRAMUSCULAR; INTRAVENOUS ONCE
Status: COMPLETED | OUTPATIENT
Start: 2019-12-28 | End: 2019-12-28

## 2019-12-28 RX ORDER — DOXYCYCLINE 100 MG/1
100 CAPSULE ORAL 2 TIMES DAILY
Qty: 14 CAPSULE | Refills: 0 | Status: SHIPPED | OUTPATIENT
Start: 2019-12-28 | End: 2020-01-08

## 2019-12-28 RX ORDER — POTASSIUM CHLORIDE 750 MG/1
30 CAPSULE, EXTENDED RELEASE ORAL ONCE
Status: COMPLETED | OUTPATIENT
Start: 2019-12-28 | End: 2019-12-28

## 2019-12-28 RX ORDER — POTASSIUM CHLORIDE 750 MG/1
10 CAPSULE, EXTENDED RELEASE ORAL ONCE
Status: COMPLETED | OUTPATIENT
Start: 2019-12-28 | End: 2019-12-28

## 2019-12-28 RX ADMIN — SODIUM CHLORIDE 500 ML: 9 INJECTION, SOLUTION INTRAVENOUS at 16:21

## 2019-12-28 RX ADMIN — PREDNISONE 20 MG: 20 TABLET ORAL at 19:26

## 2019-12-28 RX ADMIN — POTASSIUM CHLORIDE 30 MEQ: 750 CAPSULE, EXTENDED RELEASE ORAL at 17:43

## 2019-12-28 RX ADMIN — POTASSIUM CHLORIDE 10 MEQ: 750 CAPSULE, EXTENDED RELEASE ORAL at 19:26

## 2019-12-28 RX ADMIN — POTASSIUM CHLORIDE 10 MEQ: 7.46 INJECTION, SOLUTION INTRAVENOUS at 17:44

## 2019-12-28 RX ADMIN — IPRATROPIUM BROMIDE AND ALBUTEROL SULFATE 3 ML: .5; 3 SOLUTION RESPIRATORY (INHALATION) at 16:36

## 2019-12-28 RX ADMIN — METHYLPREDNISOLONE SODIUM SUCCINATE 125 MG: 125 INJECTION, POWDER, FOR SOLUTION INTRAMUSCULAR; INTRAVENOUS at 16:24

## 2019-12-28 RX ADMIN — ONDANSETRON 4 MG: 4 TABLET, ORALLY DISINTEGRATING ORAL at 19:26

## 2019-12-28 RX ADMIN — DOXYCYCLINE 100 MG: 100 CAPSULE ORAL at 19:26

## 2019-12-28 RX ADMIN — SODIUM CHLORIDE 500 ML: 9 INJECTION, SOLUTION INTRAVENOUS at 17:44

## 2020-01-02 ENCOUNTER — OFFICE VISIT (OUTPATIENT)
Dept: INTERNAL MEDICINE | Facility: CLINIC | Age: 60
End: 2020-01-02

## 2020-01-02 VITALS
WEIGHT: 171 LBS | BODY MASS INDEX: 26.84 KG/M2 | TEMPERATURE: 97.9 F | OXYGEN SATURATION: 98 % | SYSTOLIC BLOOD PRESSURE: 111 MMHG | HEART RATE: 81 BPM | DIASTOLIC BLOOD PRESSURE: 76 MMHG | HEIGHT: 67 IN | RESPIRATION RATE: 15 BRPM

## 2020-01-02 DIAGNOSIS — Z72.0 TOBACCO ABUSE: ICD-10-CM

## 2020-01-02 DIAGNOSIS — J40 BRONCHITIS: ICD-10-CM

## 2020-01-02 DIAGNOSIS — R06.2 BILATERAL WHEEZING: ICD-10-CM

## 2020-01-02 DIAGNOSIS — E87.6 HYPOKALEMIA: ICD-10-CM

## 2020-01-02 DIAGNOSIS — R06.09 DYSPNEA ON EXERTION: Primary | ICD-10-CM

## 2020-01-02 DIAGNOSIS — J44.0 CHRONIC OBSTRUCTIVE PULMONARY DISEASE WITH ACUTE LOWER RESPIRATORY INFECTION (HCC): ICD-10-CM

## 2020-01-02 PROCEDURE — 99214 OFFICE O/P EST MOD 30 MIN: CPT | Performed by: NURSE PRACTITIONER

## 2020-01-02 RX ORDER — BENZONATATE 100 MG/1
CAPSULE ORAL EVERY 8 HOURS SCHEDULED
COMMUNITY
End: 2020-01-08

## 2020-01-02 RX ORDER — IPRATROPIUM BROMIDE AND ALBUTEROL SULFATE 2.5; .5 MG/3ML; MG/3ML
3 SOLUTION RESPIRATORY (INHALATION) EVERY 4 HOURS PRN
Qty: 360 ML | Refills: 1 | Status: SHIPPED | OUTPATIENT
Start: 2020-01-02

## 2020-01-02 NOTE — PROGRESS NOTES
Chief Complaint / Reason:      Chief Complaint   Patient presents with   • Follow-up     cough, hypokalemia. wants work papers filed out. off 12/23/2019- went to [facility]-told to go back to work the next day. Went to ER Saturday.        Subjective     HPI  Patient presents today for ER follow-up regarding acute bronchitis.  Denies fever or chills.  Patient was seen at [facility] on 12/28 as she was seen at urgent care prior and was prescribed Z-Guillermo steroids and states she did not improve she continued having cough congestion and shortness of breath.  Patient did have a chest x-ray which was negative for acute cardiopulmonary process.  She does have a history of A. fib and was a current every day smoker.  She states that she has not smoke since she has been sick but she was a 1/2-1 ppd smoker for 20 some years.  She states she has also around secondhand smoke exposure also.  Patient's lab results while in the ER showed hypokalemia and patient was given potassium and prescribed doxycycline in which she states she has continued to take but it has provided minimal relief.  She states that she has ongoing shortness of breath and is scheduled with cardiology in the next month or so.  She denies chest pain.  EKG showed minimal ST depression.  D-dimer was negative.  Patient states she has been using her inhalers as prescribed.  Patient has had echocardiogram in the past which ejection fraction was 60%.  Vital signs are stable but she appears to be restless and coughs frequently and the cough is moist and loose.  Patient is requesting paperwork for [facility] to be completed for [employer] as she was told to go back to work the following day and has missed 5 days of work.  She states she has worked at [employer] for many years and she works night shift.  History taken from: patient    PMH/FH/Social History were reviewed and updated appropriately in the electronic medical record.   Past Medical History:   Diagnosis Date   •  Atrial fibrillation (CMS/HCC)     CHADS-VASC = 2   • Hypertension      History reviewed. No pertinent surgical history.  Social History     Socioeconomic History   • Marital status:      Spouse name: Not on file   • Number of children: Not on file   • Years of education: Not on file   • Highest education level: Not on file   Tobacco Use   • Smoking status: Current Every Day Smoker     Packs/day: 1.00     Years: 22.00     Pack years: 22.00     Types: Cigarettes   • Smokeless tobacco: Never Used   Substance and Sexual Activity   • Alcohol use: No   • Drug use: No   • Sexual activity: Never     Family History   Problem Relation Age of Onset   • Cancer Mother    • Cancer Maternal Aunt    • Alcohol abuse Maternal Grandfather    • Heart disease Maternal Grandfather        Review of Systems:   Review of Systems   Constitutional: Positive for activity change and fatigue. Negative for chills and fever.   Respiratory: Positive for cough, chest tightness, shortness of breath and wheezing.    Cardiovascular: Negative.  Negative for chest pain.   Gastrointestinal: Negative for nausea.   Neurological: Negative.    Psychiatric/Behavioral: Positive for sleep disturbance and stress. The patient is nervous/anxious.          All other systems were reviewed and are negative.  Exceptions are noted in the subjective or above.      Objective     Vital Signs  Vitals:    01/02/20 0920   BP: 111/76   Pulse: 81   Resp: 15   Temp: 97.9 °F (36.6 °C)   SpO2: 98%       Body mass index is 26.78 kg/m².    Physical Exam   Constitutional: She is oriented to person, place, and time. She appears well-developed and well-nourished. No distress.   HENT:   Head: Normocephalic and atraumatic.   Right Ear: Tympanic membrane is erythematous and bulging.   Left Ear: Tympanic membrane is bulging.   Nose: Nose normal.   Mouth/Throat: Oropharynx is clear and moist.   Neck: Neck supple. No JVD present.   Cardiovascular: Normal rate, regular rhythm, normal  heart sounds and intact distal pulses.   No murmur heard.  Pulmonary/Chest: Effort normal. No stridor. Tachypnea noted. No respiratory distress. She has decreased breath sounds. She has wheezes. She has rhonchi. She exhibits tenderness.   Abdominal: Soft. Bowel sounds are normal.   Musculoskeletal: Normal range of motion. She exhibits no edema.   Lymphadenopathy:     She has no cervical adenopathy.   Neurological: She is alert and oriented to person, place, and time. Coordination normal.   Skin: Skin is warm and dry. Capillary refill takes less than 2 seconds. She is not diaphoretic.   Psychiatric: Her behavior is normal. Judgment and thought content normal. Her mood appears anxious.   Nursing note and vitals reviewed.           Results Review:    I reviewed the patient's previous clinical results.       Medication Review:     Current Outpatient Medications:   •  acetaminophen (TYLENOL) 500 MG tablet, Take 500 mg by mouth Every 6 (Six) Hours As Needed for mild pain (1-3)., Disp: , Rfl:   •  albuterol sulfate  (90 Base) MCG/ACT inhaler, Inhale 2 puffs Every 4 (Four) Hours As Needed for Wheezing., Disp: 1 inhaler, Rfl: 0  •  amLODIPine (NORVASC) 10 MG tablet, Take 1 tablet by mouth Daily., Disp: 90 tablet, Rfl: 3  •  cetirizine (zyrTEC) 10 MG tablet, Take 1 tablet by mouth Daily., Disp: 30 tablet, Rfl: 2  •  doxycycline (MONODOX) 100 MG capsule, Take 1 capsule by mouth 2 (Two) Times a Day., Disp: 14 capsule, Rfl: 0  •  hydroCHLOROthiazide (HYDRODIURIL) 12.5 MG tablet, TAKE ONE TABLET BY MOUTH DAILY, Disp: 30 tablet, Rfl: 3  •  predniSONE (DELTASONE) 20 MG tablet, Take 1 tablet by mouth 2 (Two) Times a Day., Disp: 8 tablet, Rfl: 0  •  rivaroxaban (XARELTO) 20 MG tablet, Take 1 tablet by mouth Daily With Dinner., Disp: 90 tablet, Rfl: 2  •  sotalol (BETAPACE) 80 MG tablet, TAKE 1 TABLET BY MOUTH TWICE DAILY, Disp: 60 tablet, Rfl: 11  •  benzonatate (TESSALON PERLES) 100 MG capsule, Every 8 (Eight) Hours., Disp: ,  Rfl:   •  betamethasone dipropionate (DIPROLENE) 0.05 % cream, Apply  topically to the appropriate area as directed 2 (Two) Times a Day., Disp: 15 g, Rfl: 0  •  ipratropium-albuterol (DUO-NEB) 0.5-2.5 mg/3 ml nebulizer, Take 3 mL by nebulization Every 4 (Four) Hours As Needed for Wheezing., Disp: 360 mL, Rfl: 1    Assessment/Plan   Cristela was seen today for follow-up.    Diagnoses and all orders for this visit:    Dyspnea on exertion  -     BNP  -     CT Chest Without Contrast  -     Home Nebulizer  Recommend coughing and deep breathing and increasing water intake to help loosen secretions advised patient to avoid taking any cough suppressants during the day.  Recommend staying active to prevent pneumonia.  Hypokalemia  -     Comprehensive Metabolic Panel    Chronic obstructive pulmonary disease with acute lower respiratory infection (CMS/HCC)  -     CT Chest Without Contrast    Tobacco abuse  -     CT Chest Without Contrast    Bronchitis  -     ipratropium-albuterol (DUO-NEB) 0.5-2.5 mg/3 ml nebulizer; Take 3 mL by nebulization Every 4 (Four) Hours As Needed for Wheezing.  Advised patient to continue antibiotics as previously prescribed and use inhaler at home recommend smoking cessation and avoid secondhand smoke.  Bilateral wheezing  -     Home Nebulizer  -     ipratropium-albuterol (DUO-NEB) 0.5-2.5 mg/3 ml nebulizer; Take 3 mL by nebulization Every 4 (Four) Hours As Needed for Wheezing.    Paperwork filled out for [employer] and will address return date once labs and CT scan are completed.    Return if symptoms worsen or fail to improve.    Melissa oBles, ABIMBOLA  01/02/2020

## 2020-01-03 ENCOUNTER — TELEPHONE (OUTPATIENT)
Dept: INTERNAL MEDICINE | Facility: CLINIC | Age: 60
End: 2020-01-03

## 2020-01-03 LAB
ALBUMIN SERPL-MCNC: 3.9 G/DL (ref 3.5–5.2)
ALBUMIN/GLOB SERPL: 1.2 G/DL
ALP SERPL-CCNC: 76 U/L (ref 39–117)
ALT SERPL-CCNC: 12 U/L (ref 1–33)
AST SERPL-CCNC: 11 U/L (ref 1–32)
BILIRUB SERPL-MCNC: 0.5 MG/DL (ref 0.2–1.2)
BNP SERPL-MCNC: 20 PG/ML (ref 0–100)
BUN SERPL-MCNC: 13 MG/DL (ref 8–23)
BUN/CREAT SERPL: 15.9 (ref 7–25)
CALCIUM SERPL-MCNC: 9.3 MG/DL (ref 8.6–10.5)
CHLORIDE SERPL-SCNC: 94 MMOL/L (ref 98–107)
CO2 SERPL-SCNC: 30.2 MMOL/L (ref 22–29)
CREAT SERPL-MCNC: 0.82 MG/DL (ref 0.57–1)
GLOBULIN SER CALC-MCNC: 3.3 GM/DL
GLUCOSE SERPL-MCNC: 99 MG/DL (ref 65–99)
POTASSIUM SERPL-SCNC: 4 MMOL/L (ref 3.5–5.2)
PROT SERPL-MCNC: 7.2 G/DL (ref 6–8.5)
SODIUM SERPL-SCNC: 138 MMOL/L (ref 136–145)

## 2020-01-03 RX ORDER — RIVAROXABAN 20 MG/1
TABLET, FILM COATED ORAL
Qty: 90 TABLET | Refills: 0 | Status: SHIPPED | OUTPATIENT
Start: 2020-01-03 | End: 2020-03-09

## 2020-01-03 NOTE — TELEPHONE ENCOUNTER
Patient states that she left la paperwork with Carla and Sushila yesterday but didn't discuss a return date or anything. Would like a call about this.

## 2020-01-03 NOTE — PROGRESS NOTES
Please contact patient and let her know that lab results are okay with exception of slightly elevated CO2.

## 2020-01-06 NOTE — TELEPHONE ENCOUNTER
Please contact patient and clarify as she needs to let us know when she wanted to return as everything was checking out appropriately.

## 2020-01-08 ENCOUNTER — OFFICE VISIT (OUTPATIENT)
Dept: INTERNAL MEDICINE | Facility: CLINIC | Age: 60
End: 2020-01-08

## 2020-01-08 VITALS
HEIGHT: 67 IN | DIASTOLIC BLOOD PRESSURE: 70 MMHG | RESPIRATION RATE: 16 BRPM | WEIGHT: 172 LBS | HEART RATE: 84 BPM | TEMPERATURE: 98.1 F | SYSTOLIC BLOOD PRESSURE: 116 MMHG | OXYGEN SATURATION: 98 % | BODY MASS INDEX: 27 KG/M2

## 2020-01-08 DIAGNOSIS — E87.6 HYPOKALEMIA: ICD-10-CM

## 2020-01-08 DIAGNOSIS — J44.0 CHRONIC OBSTRUCTIVE PULMONARY DISEASE WITH ACUTE LOWER RESPIRATORY INFECTION (HCC): Primary | ICD-10-CM

## 2020-01-08 DIAGNOSIS — Z72.0 TOBACCO ABUSE: ICD-10-CM

## 2020-01-08 PROCEDURE — 99214 OFFICE O/P EST MOD 30 MIN: CPT | Performed by: INTERNAL MEDICINE

## 2020-01-08 NOTE — PATIENT INSTRUCTIONS
Chronic Obstructive Pulmonary Disease    Chronic obstructive pulmonary disease (COPD) is a long-term (chronic) condition that affects the lungs. COPD is a general term that can be used to describe many different lung problems that cause lung swelling (inflammation) and limit airflow, including chronic bronchitis and emphysema. If you have COPD, your lung function will probably never return to normal. In most cases, it gets worse over time. However, there are steps you can take to slow the progression of the disease and improve your quality of life.  What are the causes?  This condition may be caused by:  · Smoking. This is the most common cause.  · Certain genes passed down through families.  What increases the risk?  The following factors may make you more likely to develop this condition:  · Secondhand smoke from cigarettes, pipes, or cigars.  · Exposure to chemicals and other irritants such as fumes and dust in the work environment.  · Chronic lung conditions or infections.  What are the signs or symptoms?  Symptoms of this condition include:  · Shortness of breath, especially during physical activity.  · Chronic cough with a large amount of thick mucus. Sometimes the cough may not have any mucus (dry cough).  · Wheezing.  · Rapid breaths.  · Gray or bluish discoloration (cyanosis) of the skin, especially in your fingers, toes, or lips.  · Feeling tired (fatigue).  · Weight loss.  · Chest tightness.  · Frequent infections.  · Episodes when breathing symptoms become much worse (exacerbations).  · Swelling in the ankles, feet, or legs. This may occur in later stages of the disease.  How is this diagnosed?  This condition is diagnosed based on:  · Your medical history.  · A physical exam.  You may also have tests, including:  · Lung (pulmonary) function tests. This may include a spirometry test, which measures your ability to exhale properly.  · Chest X-ray.  · CT scan.  · Blood tests.  How is this treated?  This  condition may be treated with:  · Medicines. These may include inhaled rescue medicines to treat acute exacerbations as well as long-term, or maintenance, medicines to prevent flare-ups of COPD.  ? Bronchodilators help treat COPD by dilating the airways to allow increased airflow and make your breathing more comfortable.  ? Steroids can reduce airway inflammation and help prevent exacerbations.  · Smoking cessation. If you smoke, your health care provider may ask you to quit, and may also recommend therapy or replacement products to help you quit.  · Pulmonary rehabilitation. This may involve working with a team of health care providers and specialists, such as respiratory, occupational, and physical therapists.  · Exercise and physical activity. These are beneficial for nearly all people with COPD.  · Nutrition therapy to gain weight, if you are underweight.  · Oxygen. Supplemental oxygen therapy is only helpful if you have a low oxygen level in your blood (hypoxemia).  · Lung surgery or transplant.  · Palliative care. This is to help people with COPD feel comfortable when treatment is no longer working.  Follow these instructions at home:  Medicines  · Take over-the-counter and prescription medicines (inhaled or pills) only as told by your health care provider.  · Talk to your health care provider before taking any cough or allergy medicines. You may need to avoid certain medicines that dry out your airways.  Lifestyle  · If you are a smoker, the most important thing that you can do is to stop smoking. Do not use any products that contain nicotine or tobacco, such as cigarettes and e-cigarettes. If you need help quitting, ask your health care provider. Continuing to smoke will cause the disease to progress faster.  · Avoid exposure to things that irritate your lungs, such as smoke, chemicals, and fumes.  · Stay active, but balance activity with periods of rest. Exercise and physical activity will help you maintain  your ability to do things you want to do.  · Learn and use relaxation techniques to manage stress and to control your breathing.  · Get the right amount of sleep and get quality sleep. Most adults need 7 or more hours per night.  · Eat healthy foods. Eating smaller, more frequent meals and resting before meals may help you maintain your strength.  Controlled breathing  Learn and use controlled breathing techniques as directed by your health care provider. Controlled breathing techniques include:  · Pursed lip breathing. Start by breathing in (inhaling) through your nose for 1 second. Then, purse your lips as if you were going to whistle and breathe out (exhale) through the pursed lips for 2 seconds.  · Diaphragmatic breathing. Start by putting one hand on your abdomen just above your waist. Inhale slowly through your nose. The hand on your abdomen should move out. Then purse your lips and exhale slowly. You should be able to feel the hand on your abdomen moving in as you exhale.  Controlled coughing  Learn and use controlled coughing to clear mucus from your lungs. Controlled coughing is a series of short, progressive coughs. The steps of controlled coughing are:  1. Lean your head slightly forward.  2. Breathe in deeply using diaphragmatic breathing.  3. Try to hold your breath for 3 seconds.  4. Keep your mouth slightly open while coughing twice.  5. Spit any mucus out into a tissue.  6. Rest and repeat the steps once or twice as needed.  General instructions  · Make sure you receive all the vaccines that your health care provider recommends, especially the pneumococcal and influenza vaccines. Preventing infection and hospitalization is very important when you have COPD.  · Use oxygen therapy and pulmonary rehabilitation if directed to by your health care provider. If you require home oxygen therapy, ask your health care provider whether you should purchase a pulse oximeter to measure your oxygen level at  home.  · Work with your health care provider to develop a COPD action plan. This will help you know what steps to take if your condition gets worse.  · Keep other chronic health conditions under control as told by your health care provider.  · Avoid extreme temperature and humidity changes.  · Avoid contact with people who have an illness that spreads from person to person (is contagious), such as viral infections or pneumonia.  · Keep all follow-up visits as told by your health care provider. This is important.  Contact a health care provider if:  · You are coughing up more mucus than usual.  · There is a change in the color or thickness of your mucus.  · Your breathing is more labored than usual.  · Your breathing is faster than usual.  · You have difficulty sleeping.  · You need to use your rescue medicines or inhalers more often than expected.  · You have trouble doing routine activities such as getting dressed or walking around the house.  Get help right away if:  · You have shortness of breath while you are resting.  · You have shortness of breath that prevents you from:  ? Being able to talk.  ? Performing your usual physical activities.  · You have chest pain lasting longer than 5 minutes.  · Your skin color is more blue (cyanotic) than usual.  · You measure low oxygen saturations for longer than 5 minutes with a pulse oximeter.  · You have a fever.  · You feel too tired to breathe normally.  Summary  · Chronic obstructive pulmonary disease (COPD) is a long-term (chronic) condition that affects the lungs.  · Your lung function will probably never return to normal. In most cases, it gets worse over time. However, there are steps you can take to slow the progression of the disease and improve your quality of life.  · Treatment for COPD may include taking medicines, quitting smoking, pulmonary rehabilitation, and changes to diet and exercise. As the disease progresses, you may need oxygen therapy, a lung  transplant, or palliative care.  · To help manage your condition, do not smoke, avoid exposure to things that irritate your lungs, stay up to date on all vaccines, and follow your health care provider's instructions for taking medicines.  This information is not intended to replace advice given to you by your health care provider. Make sure you discuss any questions you have with your health care provider.  Document Released: 09/27/2006 Document Revised: 06/13/2018 Document Reviewed: 01/22/2018  [employer] Interactive Patient Education © 2019 [employer]

## 2020-01-09 ENCOUNTER — HOSPITAL ENCOUNTER (OUTPATIENT)
Dept: CT IMAGING | Facility: HOSPITAL | Age: 60
Discharge: HOME | End: 2020-01-09
Admitting: NURSE PRACTITIONER

## 2020-01-09 PROCEDURE — 71250 CT THORAX DX C-: CPT

## 2020-01-09 NOTE — TELEPHONE ENCOUNTER
Spoke with patient. Stated she seen Dr Licea yesterday. Laid papers on Marija desk for otilio to complete

## 2020-01-09 NOTE — PROGRESS NOTES
Please contact patient and let her know that CT scan did show patchy groundglass pregnancies within both lungs which may be infectious or inflammatory in nature and she also had 2.1 cm left adrenal lesion suggesting adrenal adenoma which is benign unless it gets larger but she can discuss this with her PCP.

## 2020-01-10 ENCOUNTER — TELEPHONE (OUTPATIENT)
Dept: INTERNAL MEDICINE | Facility: CLINIC | Age: 60
End: 2020-01-10

## 2020-01-10 NOTE — PROGRESS NOTES
Chief Complaint   Patient presents with   • Cough     and still feeling rough- ER 12/28       Subjective     History of Present Illness   Cristela Stratton is a 59 y.o. female presenting for follow up on COPD with concerns of persistent shortness of breath and wheezing despite recent treatments with steroids and antibiotics.  She denies fevers or chills but does have exertional SOA and dyspnea.  No significant productive cough.     The following portions of the patient's history were reviewed and updated as appropriate: allergies, current medications, past family history, past medical history, past social history, past surgical history and problem list.    Review of Systems   Constitutional: Negative for chills, fatigue and fever.   HENT: Negative for congestion, ear pain, rhinorrhea, sinus pressure and sore throat.    Eyes: Negative for visual disturbance.   Respiratory: Positive for shortness of breath. Negative for cough, chest tightness and wheezing.    Cardiovascular: Negative for chest pain, palpitations and leg swelling.   Gastrointestinal: Negative for abdominal pain, blood in stool, constipation, diarrhea, nausea and vomiting.   Endocrine: Negative for polydipsia and polyuria.   Genitourinary: Negative for dysuria and hematuria.   Musculoskeletal: Negative for arthralgias and back pain.   Skin: Negative for rash.   Neurological: Negative for dizziness, light-headedness, numbness and headaches.   Psychiatric/Behavioral: Negative for dysphoric mood and sleep disturbance. The patient is not nervous/anxious.        No Known Allergies    Past Medical History:   Diagnosis Date   • Atrial fibrillation (CMS/HCC)     CHADS-VASC = 2   • Hypertension        Social History     Socioeconomic History   • Marital status:      Spouse name: Not on file   • Number of children: Not on file   • Years of education: Not on file   • Highest education level: Not on file   Tobacco Use   • Smoking status: Current Every Day  "Smoker     Packs/day: 1.00     Years: 22.00     Pack years: 22.00     Types: Cigarettes   • Smokeless tobacco: Never Used   Substance and Sexual Activity   • Alcohol use: No   • Drug use: No   • Sexual activity: Never        History reviewed. No pertinent surgical history.    Family History   Problem Relation Age of Onset   • Cancer Mother    • Cancer Maternal Aunt    • Alcohol abuse Maternal Grandfather    • Heart disease Maternal Grandfather          Current Outpatient Medications:   •  acetaminophen (TYLENOL) 500 MG tablet, Take 500 mg by mouth Every 6 (Six) Hours As Needed for mild pain (1-3)., Disp: , Rfl:   •  albuterol sulfate  (90 Base) MCG/ACT inhaler, Inhale 2 puffs Every 4 (Four) Hours As Needed for Wheezing., Disp: 1 inhaler, Rfl: 0  •  amLODIPine (NORVASC) 10 MG tablet, Take 1 tablet by mouth Daily., Disp: 90 tablet, Rfl: 3  •  betamethasone dipropionate (DIPROLENE) 0.05 % cream, Apply  topically to the appropriate area as directed 2 (Two) Times a Day., Disp: 15 g, Rfl: 0  •  hydroCHLOROthiazide (HYDRODIURIL) 12.5 MG tablet, TAKE ONE TABLET BY MOUTH DAILY, Disp: 30 tablet, Rfl: 3  •  ipratropium-albuterol (DUO-NEB) 0.5-2.5 mg/3 ml nebulizer, Take 3 mL by nebulization Every 4 (Four) Hours As Needed for Wheezing., Disp: 360 mL, Rfl: 1  •  sotalol (BETAPACE) 80 MG tablet, TAKE 1 TABLET BY MOUTH TWICE DAILY, Disp: 60 tablet, Rfl: 11  •  XARELTO 20 MG tablet, TAKE 1 TABLET BY MOUTH ONCE DAILY WITH  DINNER, Disp: 90 tablet, Rfl: 0    Objective   /70   Pulse 84   Temp 98.1 °F (36.7 °C)   Resp 16   Ht 170.2 cm (67\")   Wt 78 kg (172 lb)   LMP  (LMP Unknown)   SpO2 98%   BMI 26.94 kg/m²     Physical Exam   Constitutional: She is oriented to person, place, and time. She appears well-developed and well-nourished.   HENT:   Head: Normocephalic and atraumatic.   Eyes: Conjunctivae are normal.   Neck: Normal range of motion. Neck supple.   Pulmonary/Chest: Effort normal. No respiratory distress. " She has wheezes.   Musculoskeletal: Normal range of motion.   Neurological: She is alert and oriented to person, place, and time.   Skin: No rash noted.   Psychiatric: She has a normal mood and affect. Her behavior is normal.   Nursing note and vitals reviewed.      Assessment/Plan   Cristela was seen today for cough.    Diagnoses and all orders for this visit:    Chronic obstructive pulmonary disease with acute lower respiratory infection (CMS/HCC)  -     Ambulatory Referral to Pulmonology    Tobacco abuse    Hypokalemia          Discussion Summary:  Patient is a 59 y.o. female presenting for follow up.    COPD with persistent dyspnea  - walking stress test performed, no hypoxia with exertion noted in office today.   - patient has CT imaging pending for tomorrow  - samples of Breo inhalers given today as she has not been able to fill long acting inhalers due to cost.   - consider PFTs after follow up of CT imaging.    Hypokalemia - resolved.     Has reduced smoking but needs to further work on cutting down      Follow up:  Return in about 2 weeks (around 1/22/2020) for Next scheduled follow up.

## 2020-01-12 DIAGNOSIS — I10 ESSENTIAL HYPERTENSION: ICD-10-CM

## 2020-01-13 ENCOUNTER — TELEPHONE (OUTPATIENT)
Dept: INTERNAL MEDICINE | Facility: CLINIC | Age: 60
End: 2020-01-13

## 2020-01-13 ENCOUNTER — OFFICE VISIT (OUTPATIENT)
Dept: PULMONOLOGY | Facility: CLINIC | Age: 60
End: 2020-01-13

## 2020-01-13 VITALS
WEIGHT: 171 LBS | OXYGEN SATURATION: 96 % | TEMPERATURE: 98.6 F | BODY MASS INDEX: 26.84 KG/M2 | DIASTOLIC BLOOD PRESSURE: 72 MMHG | HEART RATE: 74 BPM | HEIGHT: 67 IN | SYSTOLIC BLOOD PRESSURE: 118 MMHG

## 2020-01-13 DIAGNOSIS — J44.9 CHRONIC OBSTRUCTIVE PULMONARY DISEASE, UNSPECIFIED COPD TYPE (HCC): ICD-10-CM

## 2020-01-13 DIAGNOSIS — I48.0 PAROXYSMAL ATRIAL FIBRILLATION (HCC): ICD-10-CM

## 2020-01-13 DIAGNOSIS — Z72.0 TOBACCO ABUSE: ICD-10-CM

## 2020-01-13 DIAGNOSIS — J44.9 CHRONIC OBSTRUCTIVE PULMONARY DISEASE, UNSPECIFIED COPD TYPE (HCC): Primary | ICD-10-CM

## 2020-01-13 DIAGNOSIS — I10 ESSENTIAL HYPERTENSION: ICD-10-CM

## 2020-01-13 PROCEDURE — 94726 PLETHYSMOGRAPHY LUNG VOLUMES: CPT | Performed by: INTERNAL MEDICINE

## 2020-01-13 PROCEDURE — 99204 OFFICE O/P NEW MOD 45 MIN: CPT | Performed by: INTERNAL MEDICINE

## 2020-01-13 PROCEDURE — 94375 RESPIRATORY FLOW VOLUME LOOP: CPT | Performed by: INTERNAL MEDICINE

## 2020-01-13 PROCEDURE — 94729 DIFFUSING CAPACITY: CPT | Performed by: INTERNAL MEDICINE

## 2020-01-13 RX ORDER — AMLODIPINE BESYLATE 10 MG/1
TABLET ORAL
Qty: 30 TABLET | Refills: 0 | Status: SHIPPED | OUTPATIENT
Start: 2020-01-13 | End: 2020-02-13

## 2020-01-13 NOTE — TELEPHONE ENCOUNTER
Pt is wanting to know if her [facility] paperwork has been faxed into [employer].  Pt says she is suppose to return to work on Saturday and they say they still have not received it.  Pt request call back with status.

## 2020-01-13 NOTE — PROGRESS NOTES
New Patient Pulmonary Office Visit      Patient Name: Cristela Stratton    Referring Physician: Jluis Licea DO    Chief Complaint:    Chief Complaint   Patient presents with   • Shortness of Breath       History of Present Illness: Cristela Stratton is a 59 y.o. female who is here today to establish care with Pulmonary.  Patient has a past medical history significant for A. fib, hypertension, and COPD.  She was referred to pulmonary for evaluation of her COPD.  She had never been formally diagnosed.  She notes that she has bronchitis 1-2 times per year that require antibiotics steroids.  She has been hospitalized 2 times in her life once in 2018 which she states was secondary to pneumonia and the other time it was a few weeks ago.  She was treated with steroids and antibiotics at each of these occasions.  She notes since she was hospitalized she has been having increased shortness of breath and a productive cough, although in general this is gradually improving.  She was given Breo at the time of discharge but she states she is not currently taking it.  In addition to this she was also given albuterol nebs and inhalers which she states she will intermittently use 2-3 times per week.  She denies any fevers, chills, chest pain, or abdominal pain.  She works at [employer] and does a lot of physical activity, she states that prior to her hospitalization she had never had any difficulty with doing her job.  She does smoke daily is a 25 pack/year history.  She is currently not interested in quitting smoking.  She also has A. fib and is on sotalol, she states that this is been well controlled and she is Dr. Davidson in cardiology.    Review of Systems:   Review of Systems   Constitutional: Positive for fatigue. Negative for activity change, appetite change, chills and diaphoresis.   HENT: Positive for congestion and postnasal drip. Negative for sinus pressure and voice change.    Eyes: Negative for blurred vision.    Respiratory: Positive for cough, shortness of breath and wheezing.    Cardiovascular: Negative for chest pain, palpitations and leg swelling.   Gastrointestinal: Negative for abdominal pain.   Musculoskeletal: Negative for myalgias.   Skin: Negative for color change and dry skin.   Allergic/Immunologic: Negative for environmental allergies.   Neurological: Negative for weakness and confusion.   Hematological: Negative for adenopathy.   Psychiatric/Behavioral: Negative for sleep disturbance and depressed mood.       Past Medical History:   Past Medical History:   Diagnosis Date   • Atrial fibrillation (CMS/HCC)     CHADS-VASC = 2   • Hypertension        Past Surgical History: History reviewed. No pertinent surgical history.    Family History:   Family History   Problem Relation Age of Onset   • Cancer Mother    • Cancer Maternal Aunt    • Alcohol abuse Maternal Grandfather    • Heart disease Maternal Grandfather        Social History:   Social History     Socioeconomic History   • Marital status:      Spouse name: Not on file   • Number of children: Not on file   • Years of education: Not on file   • Highest education level: Not on file   Tobacco Use   • Smoking status: Current Every Day Smoker     Packs/day: 1.00     Years: 22.00     Pack years: 22.00     Types: Cigarettes   • Smokeless tobacco: Never Used   Substance and Sexual Activity   • Alcohol use: No   • Drug use: No   • Sexual activity: Never       Medications:     Current Outpatient Medications:   •  acetaminophen (TYLENOL) 500 MG tablet, Take 500 mg by mouth Every 6 (Six) Hours As Needed for mild pain (1-3)., Disp: , Rfl:   •  albuterol sulfate  (90 Base) MCG/ACT inhaler, Inhale 2 puffs Every 4 (Four) Hours As Needed for Wheezing., Disp: 1 inhaler, Rfl: 0  •  amLODIPine (NORVASC) 10 MG tablet, TAKE 1 TABLET BY MOUTH ONCE DAILY, Disp: 30 tablet, Rfl: 0  •  betamethasone dipropionate (DIPROLENE) 0.05 % cream, Apply  topically to the  "appropriate area as directed 2 (Two) Times a Day., Disp: 15 g, Rfl: 0  •  hydroCHLOROthiazide (HYDRODIURIL) 12.5 MG tablet, TAKE ONE TABLET BY MOUTH DAILY, Disp: 30 tablet, Rfl: 3  •  ipratropium-albuterol (DUO-NEB) 0.5-2.5 mg/3 ml nebulizer, Take 3 mL by nebulization Every 4 (Four) Hours As Needed for Wheezing., Disp: 360 mL, Rfl: 1  •  sotalol (BETAPACE) 80 MG tablet, TAKE 1 TABLET BY MOUTH TWICE DAILY, Disp: 60 tablet, Rfl: 11  •  XARELTO 20 MG tablet, TAKE 1 TABLET BY MOUTH ONCE DAILY WITH  DINNER, Disp: 90 tablet, Rfl: 0  •  tiotropium bromide-olodaterol (STIOLTO RESPIMAT) 2.5-2.5 MCG/ACT aerosol solution inhaler, Inhale 2 puffs Daily for 30 days., Disp: 4 g, Rfl: 5    Allergies:   No Known Allergies    Physical Exam:  Vital Signs:   Vitals:    01/13/20 1205   BP: 118/72   Pulse: 74   Temp: 98.6 °F (37 °C)   SpO2: 96%  Comment: room air at rest   Weight: 77.6 kg (171 lb)   Height: 170.2 cm (67\")       Physical Exam   Constitutional: She is oriented to person, place, and time. She appears well-developed.   HENT:   Head: Normocephalic and atraumatic.   Nose: Nose normal.   Mouth/Throat: Oropharynx is clear and moist.   Eyes: Pupils are equal, round, and reactive to light. Conjunctivae are normal.   Neck: Normal range of motion. Neck supple.   Cardiovascular: Normal rate and regular rhythm. Exam reveals no gallop and no friction rub.   No murmur heard.  Pulmonary/Chest: Effort normal and breath sounds normal. She has no wheezes. She has no rales.   Abdominal: Soft. Bowel sounds are normal.   Musculoskeletal: Normal range of motion. She exhibits no edema.   Neurological: She is alert and oriented to person, place, and time.   Skin: Skin is warm and dry. No erythema.   Psychiatric: She has a normal mood and affect. Her behavior is normal.   Nursing note and vitals reviewed.      Results Review:   - I personally reviewed the pts imaging from January 2020 which shows bilateral patchy groundglass opacities mostly in " the right upper right middle and lingula, no masses or nodules are seen.  - I personally reviewed the pts PFT from 1/13/2020 which showed moderate obstruction without restriction and a mildly reduced DLCO at 60%, FEV1 was noted to be 63%.  - I personally reviewed the pts Echo report from 4/29/2019 showed a left ventricular systolic function was normal and EF of 60%, with normal diastolic function and no evidence of mitral valve prolapse..    Assessment / Plan:   Chronic obstructive pulmonary disease, unspecified COPD type (CMS/formerly [facility])  -Patient notably has COPD which we confirmed today with her PFTs, and her history.  Currently she is gold stage III class C.  Given this I will go ahead and initiate the patient on Stiolto 2 puffs once daily.  She was given a sample in the office today and I performed inhaler education.  In addition to this I informed her that preferably I would like her on any LABA/LAMA combination and therefore if her insurance would not cover the Stiolto, and Anoro would be a viable option as well.  I noted that if they would not cover either of these then I would attempt to try her on Spiriva and a LABA/ICS inhaler, although I do not currently feel that she would benefit from an ICS and this would only give her the increased risk of pneumonia.  She plans to attempt to fill the Stiolto and if it is to higher price she will call the office and we will switch the prescription.    Paroxysmal atrial fibrillation (CMS/formerly [facility])  -I discussed with the patient that her A. fib history, which currently is well controlled on Stiolto, I explained to her that there is a beta agonist component to these medications, and even though they can precipitate atrial fibrillation this is an uncommon side effect at this point the benefits outweigh the risk.  -Continue management per cardiology    Tobacco abuse  - In this visit the patient was advised to stop smoking and was offered tobacco cessation measures and resources,  including NRT and/or medication intervention. Greater than 10 minutes was spent on face-to-face counseling regarding smoking cessation.     Essential hypertension  - Blood pressures currently well controlled, she is continue amlodipine and hydrochlorothiazide.    Follow Up:   Return in about 3 months (around 4/13/2020).     WINNIE Hernandez DO  Pulmonary and Critical Care Medicine  Note Electronically Signed    Please note that portions of this note may have been completed with a voice recognition program. Efforts were made to edit the dictations, but occasionally words are mistranscribed.

## 2020-01-15 NOTE — TELEPHONE ENCOUNTER
Patient called advised that [facility] paperwork needs to be faxed today, so that the recipients can file the documents and she doesn't lose her job.     Please Advise.

## 2020-01-29 ENCOUNTER — TELEPHONE (OUTPATIENT)
Dept: INTERNAL MEDICINE | Facility: CLINIC | Age: 60
End: 2020-01-29

## 2020-01-29 NOTE — TELEPHONE ENCOUNTER
Pt called and requested an antibiotic be called in for upper  Respiratory infection to [facility] in [place]    Pt call back  [phone]

## 2020-02-13 DIAGNOSIS — I10 ESSENTIAL HYPERTENSION: ICD-10-CM

## 2020-02-13 RX ORDER — AMLODIPINE BESYLATE 10 MG/1
TABLET ORAL
Qty: 30 TABLET | Refills: 0 | Status: SHIPPED | OUTPATIENT
Start: 2020-02-13 | End: 2020-02-13 | Stop reason: SDUPTHER

## 2020-02-13 RX ORDER — SOTALOL HYDROCHLORIDE 80 MG/1
80 TABLET ORAL 2 TIMES DAILY
Qty: 60 TABLET | Refills: 11 | Status: SHIPPED | OUTPATIENT
Start: 2020-02-13 | End: 2020-02-20 | Stop reason: SDUPTHER

## 2020-02-13 RX ORDER — AMLODIPINE BESYLATE 10 MG/1
10 TABLET ORAL DAILY
Qty: 30 TABLET | Refills: 0 | Status: SHIPPED | OUTPATIENT
Start: 2020-02-13 | End: 2020-03-03

## 2020-02-20 DIAGNOSIS — I10 ESSENTIAL HYPERTENSION: ICD-10-CM

## 2020-02-20 RX ORDER — HYDROCHLOROTHIAZIDE 12.5 MG/1
TABLET ORAL
Qty: 30 TABLET | Refills: 5 | Status: SHIPPED | OUTPATIENT
Start: 2020-02-20 | End: 2020-02-21

## 2020-02-20 RX ORDER — SOTALOL HYDROCHLORIDE 80 MG/1
80 TABLET ORAL 2 TIMES DAILY
Qty: 60 TABLET | Refills: 3 | Status: SHIPPED | OUTPATIENT
Start: 2020-02-20 | End: 2020-06-15

## 2020-02-21 DIAGNOSIS — I10 ESSENTIAL HYPERTENSION: ICD-10-CM

## 2020-02-21 RX ORDER — HYDROCHLOROTHIAZIDE 12.5 MG/1
TABLET ORAL
Qty: 30 TABLET | Refills: 2 | Status: SHIPPED | OUTPATIENT
Start: 2020-02-21 | End: 2020-08-31

## 2020-02-27 RX ORDER — ALBUTEROL SULFATE 90 UG/1
2 AEROSOL, METERED RESPIRATORY (INHALATION) EVERY 4 HOURS PRN
Qty: 1 INHALER | Refills: 2 | Status: SHIPPED | OUTPATIENT
Start: 2020-02-27 | End: 2021-01-07 | Stop reason: SDUPTHER

## 2020-02-27 RX ORDER — ALBUTEROL SULFATE 90 UG/1
2 AEROSOL, METERED RESPIRATORY (INHALATION) EVERY 4 HOURS PRN
Qty: 1 INHALER | Refills: 2 | Status: SHIPPED | OUTPATIENT
Start: 2020-02-27 | End: 2020-02-27 | Stop reason: SDUPTHER

## 2020-02-27 NOTE — TELEPHONE ENCOUNTER
PT CALLED AND STATED THAT SHE NEEDS A REFILL ON THE FOLLOWING FOR A RESCUE INHALER - PT STATES THAT IT WAS GIVEN TO HER IN THE ER LAST YEAR BUT STILL HAS A NEED FOR IT    albuterol sulfate  (90 Base) MCG/ACT inhaler    PT CALL BACK [phone]    [facility]

## 2020-03-02 DIAGNOSIS — I10 ESSENTIAL HYPERTENSION: ICD-10-CM

## 2020-03-03 RX ORDER — AMLODIPINE BESYLATE 10 MG/1
TABLET ORAL
Qty: 30 TABLET | Refills: 0 | Status: SHIPPED | OUTPATIENT
Start: 2020-03-03 | End: 2020-04-06

## 2020-03-09 RX ORDER — RIVAROXABAN 20 MG/1
TABLET, FILM COATED ORAL
Qty: 90 TABLET | Refills: 0 | Status: SHIPPED | OUTPATIENT
Start: 2020-03-09 | End: 2020-07-01

## 2020-04-05 DIAGNOSIS — I10 ESSENTIAL HYPERTENSION: ICD-10-CM

## 2020-04-06 RX ORDER — AMLODIPINE BESYLATE 10 MG/1
TABLET ORAL
Qty: 30 TABLET | Refills: 0 | Status: SHIPPED | OUTPATIENT
Start: 2020-04-06 | End: 2020-04-17

## 2020-04-17 DIAGNOSIS — I10 ESSENTIAL HYPERTENSION: ICD-10-CM

## 2020-04-17 RX ORDER — AMLODIPINE BESYLATE 10 MG/1
TABLET ORAL
Qty: 30 TABLET | Refills: 0 | Status: SHIPPED | OUTPATIENT
Start: 2020-04-17 | End: 2020-06-15

## 2020-04-20 ENCOUNTER — TELEPHONE (OUTPATIENT)
Dept: INTERNAL MEDICINE | Facility: CLINIC | Age: 60
End: 2020-04-20

## 2020-04-20 NOTE — TELEPHONE ENCOUNTER
PT STATED SHE NEEDS A BUNCH OF TEETH PULLED AND HER DENTIST IS CLOSED. SHE STATED HER TEETH ARE   ABCESS OVER THE WEEKEND. SHE HAS TRIED TYLENOL BUT IT IS NOT WORKING. SHE WOULD LIKE TO SEE IF SOMETHING CAN BE CALLED IN.    PT CALL BACK [phone]    AMIRA GUPTA

## 2020-04-21 ENCOUNTER — TELEMEDICINE (OUTPATIENT)
Dept: INTERNAL MEDICINE | Facility: CLINIC | Age: 60
End: 2020-04-21

## 2020-04-21 VITALS — TEMPERATURE: 99.1 F

## 2020-04-21 DIAGNOSIS — K04.7 DENTAL ABSCESS: Primary | ICD-10-CM

## 2020-04-21 PROCEDURE — 99213 OFFICE O/P EST LOW 20 MIN: CPT | Performed by: INTERNAL MEDICINE

## 2020-04-21 RX ORDER — AMOXICILLIN 500 MG/1
500 CAPSULE ORAL 3 TIMES DAILY
Qty: 30 CAPSULE | Refills: 0 | Status: SHIPPED | OUTPATIENT
Start: 2020-04-21 | End: 2020-08-12

## 2020-04-21 RX ORDER — TIOTROPIUM BROMIDE AND OLODATEROL 3.124; 2.736 UG/1; UG/1
SPRAY, METERED RESPIRATORY (INHALATION)
COMMUNITY
Start: 2020-04-06 | End: 2020-08-12

## 2020-04-21 NOTE — PROGRESS NOTES
..You have chosen to receive care through a telehealth visit.  Do you consent to use a video/audio connection for your medical care today? Yes    PC/KW    Televisit via Epic Video    Chief Complaint   Patient presents with   • Facial Pain     Woke up today face swollen, and teeth are abcessed, fever, started Sunday night       Subjective     History of Present Illness   Cristela Stratton is a 60 y.o. female presenting for follow up via televisit for concerns of upper dental pain in the anterior teeth radiating to the right maxillary sinus which began 2 days prior.  She has known dental abscesses and unfortunately has not been able to make it to a dentist with the COVID lockdown.  She has associated facial swelling and low grade fever of 99.1F since yesterday.     The following portions of the patient's history were reviewed and updated as appropriate: allergies, current medications, past family history, past medical history, past social history, past surgical history and problem list.    Review of Systems   Constitutional: Positive for fever. Negative for chills and fatigue.   HENT: Positive for dental problem and facial swelling. Negative for congestion, ear pain, rhinorrhea, sinus pressure and sore throat.    Eyes: Negative for visual disturbance.   Respiratory: Negative for cough, chest tightness, shortness of breath and wheezing.    Cardiovascular: Negative for chest pain, palpitations and leg swelling.   Gastrointestinal: Negative for abdominal pain, blood in stool, constipation, diarrhea, nausea and vomiting.   Endocrine: Negative for polydipsia and polyuria.   Genitourinary: Negative for dysuria and hematuria.   Musculoskeletal: Negative for arthralgias and back pain.   Skin: Negative for rash.   Neurological: Negative for dizziness, light-headedness, numbness and headaches.   Psychiatric/Behavioral: Negative for dysphoric mood and sleep disturbance. The patient is not nervous/anxious.        No Known  Allergies    Past Medical History:   Diagnosis Date   • Atrial fibrillation (CMS/HCC)     CHADS-VASC = 2   • Hypertension        Social History     Socioeconomic History   • Marital status:      Spouse name: Not on file   • Number of children: Not on file   • Years of education: Not on file   • Highest education level: Not on file   Tobacco Use   • Smoking status: Current Every Day Smoker     Packs/day: 1.00     Years: 22.00     Pack years: 22.00     Types: Cigarettes   • Smokeless tobacco: Never Used   Substance and Sexual Activity   • Alcohol use: No   • Drug use: No   • Sexual activity: Never        No past surgical history on file.    Family History   Problem Relation Age of Onset   • Cancer Mother    • Cancer Maternal Aunt    • Alcohol abuse Maternal Grandfather    • Heart disease Maternal Grandfather          Current Outpatient Medications:   •  acetaminophen (TYLENOL) 500 MG tablet, Take 500 mg by mouth Every 6 (Six) Hours As Needed for mild pain (1-3)., Disp: , Rfl:   •  albuterol sulfate  (90 Base) MCG/ACT inhaler, Inhale 2 puffs Every 4 (Four) Hours As Needed for Wheezing., Disp: 1 inhaler, Rfl: 2  •  amLODIPine (NORVASC) 10 MG tablet, Take 1 tablet by mouth once daily, Disp: 30 tablet, Rfl: 0  •  hydroCHLOROthiazide (HYDRODIURIL) 12.5 MG tablet, TAKE ONE TABLET BY MOUTH DAILY, Disp: 30 tablet, Rfl: 2  •  ipratropium-albuterol (DUO-NEB) 0.5-2.5 mg/3 ml nebulizer, Take 3 mL by nebulization Every 4 (Four) Hours As Needed for Wheezing., Disp: 360 mL, Rfl: 1  •  sotalol (BETAPACE) 80 MG tablet, Take 1 tablet by mouth 2 (Two) Times a Day., Disp: 60 tablet, Rfl: 3  •  STIOLTO RESPIMAT 2.5-2.5 MCG/ACT aerosol solution inhaler, INHALE 2 PUFFS BY MOUTH ONCE DAILY FOR 30 DAYS, Disp: , Rfl:   •  XARELTO 20 MG tablet, TAKE 1 TABLET BY MOUTH ONCE DAILY WITH SUPPER, Disp: 90 tablet, Rfl: 0  •  amoxicillin (AMOXIL) 500 MG capsule, Take 1 capsule by mouth 3 (Three) Times a Day., Disp: 30 capsule, Rfl: 0  •   betamethasone dipropionate (DIPROLENE) 0.05 % cream, Apply  topically to the appropriate area as directed 2 (Two) Times a Day., Disp: 15 g, Rfl: 0    Objective   Temp 99.1 °F (37.3 °C)   LMP  (LMP Unknown)     Physical Exam   Constitutional: She is oriented to person, place, and time. She appears well-developed and well-nourished.   HENT:   Head: Normocephalic and atraumatic.   Poor dentition   Eyes: Conjunctivae are normal.   Neck: Normal range of motion. Neck supple.   Pulmonary/Chest: Effort normal.   Musculoskeletal: Normal range of motion.   Neurological: She is alert and oriented to person, place, and time.   Skin: No rash noted.   Psychiatric: She has a normal mood and affect. Her behavior is normal.   Nursing note and vitals reviewed.    Limited Physical exam due to video visit    Assessment/Plan   Cristela was seen today for facial pain.    Diagnoses and all orders for this visit:    Dental abscess  -     amoxicillin (AMOXIL) 500 MG capsule; Take 1 capsule by mouth 3 (Three) Times a Day.          Discussion Summary:  Patient is a 60 y.o. female presenting for follow up with dental abscess / infection.  Start amoxicillin 500mg PO TID x 10 days.  Advised to contact dentist if symptoms persist for possible drainage of abscess if needed.     Follow up:  No follow-ups on file.

## 2020-04-27 NOTE — PROGRESS NOTES
[facility] AT [address], [address]  [place], [place], [address] [phone]  WWW[facility]           OUTPATIENT CLINIC FOLLOW UP NOTE    Patient Care Team:  Patient Care Team:  Jluis Licea DO as PCP - General (Internal Medicine)    Subjective:   Reason for consultation:   Chief Complaint   Patient presents with   • Non-rheumatic mitral regurgitation     f/u       HPI:    Cristela Stratton is a 60 y.o. female.  Cardiac problem list:  1. Paroxysmal atrial fibrillation  2. Mild mitral regurgitation  3. Hypertension  4. Tobacco dependence, quit in 2018 but restarted in late 2019    She presents for follow-up.    The patient has been doing well from a clinical standpoint.  Reports mild palpitations that are self-limiting, lasting seconds, not associated chest pain or dyspnea.  Tolerating her medicines without difficulty.  Started smoking again since her last visit.    Review of Systems:  Positive for rare palpitations  Negative for exertional chest pain, dyspnea with exertion, orthopnea, PND, lightheadedness, syncope.     PFSH:  Patient Active Problem List   Diagnosis   • Tobacco abuse   • Essential hypertension   • Bronchitis   • Atrial fibrillation (CMS/HCC)   • Non-rheumatic mitral regurgitation   • COPD (chronic obstructive pulmonary disease) (CMS/HCC)   • Subclinical hyperthyroidism   • Lower extremity edema         Current Outpatient Medications:   •  acetaminophen (TYLENOL) 500 MG tablet, Take 500 mg by mouth Every 6 (Six) Hours As Needed for mild pain (1-3)., Disp: , Rfl:   •  albuterol sulfate  (90 Base) MCG/ACT inhaler, Inhale 2 puffs Every 4 (Four) Hours As Needed for Wheezing., Disp: 1 inhaler, Rfl: 2  •  amLODIPine (NORVASC) 10 MG tablet, Take 1 tablet by mouth once daily, Disp: 30 tablet, Rfl: 0  •  amoxicillin (AMOXIL) 500 MG capsule, Take 1 capsule by mouth 3 (Three) Times a Day., Disp: 30 capsule, Rfl: 0  •  hydroCHLOROthiazide  "(HYDRODIURIL) 12.5 MG tablet, TAKE ONE TABLET BY MOUTH DAILY, Disp: 30 tablet, Rfl: 2  •  ipratropium-albuterol (DUO-NEB) 0.5-2.5 mg/3 ml nebulizer, Take 3 mL by nebulization Every 4 (Four) Hours As Needed for Wheezing., Disp: 360 mL, Rfl: 1  •  sotalol (BETAPACE) 80 MG tablet, Take 1 tablet by mouth 2 (Two) Times a Day., Disp: 60 tablet, Rfl: 3  •  STIOLTO RESPIMAT 2.5-2.5 MCG/ACT aerosol solution inhaler, INHALE 2 PUFFS BY MOUTH ONCE DAILY FOR 30 DAYS, Disp: , Rfl:   •  XARELTO 20 MG tablet, TAKE 1 TABLET BY MOUTH ONCE DAILY WITH SUPPER, Disp: 90 tablet, Rfl: 0    No Known Allergies     reports that she has been smoking cigarettes. She has a 22.00 pack-year smoking history. She has never used smokeless tobacco.    Family History   Problem Relation Age of Onset   • Cancer Mother    • Cancer Maternal Aunt    • Alcohol abuse Maternal Grandfather    • Heart disease Maternal Grandfather        Objective:   Blood pressure 142/90, pulse 80, height 177.8 cm (70\"), weight 82.1 kg (181 lb).  CONSTITUTIONAL: No acute distress, normal affect  RESPIRATORY: Normal effort. Clear to auscultation bilaterally without wheezing or rales  CARDIOVASCULAR: Carotids with normal upstrokes without bruits.  Regular rate and rhythm with normal S1 and S2. Without murmur, gallop or rub.  PERIPHERAL VASCULAR: Normal radial pulses bilaterally.  There is no peripheral edema bilaterally.     Labs:  Lab Results   Component Value Date    ALT 12 01/02/2020    AST 11 01/02/2020     Lab Results   Component Value Date    CREATININE 0.82 01/02/2020       Diagnostic Data:      ECG 12 Lead  Date/Time: 4/28/2020 9:39 AM  Performed by: Adarsh Davidson MD  Authorized by: Adarsh Davidson MD   Comparison: compared with previous ECG from 8/12/2019  Similar to previous ECG  Rhythm: sinus rhythm  Comments: Heart rate 74, QRS 92, QTc 461            14-day Zio Patch 9/2018  -No A. fib, longest SVT 11 beats.  -Patient triggered events were all normal sinus " rhythm    TTE 5/2019  · Left ventricular systolic function is normal. Estimated EF = 60%.  · Left ventricular diastolic function is normal.  · There is no evidence of mitral valve prolapse. Only trace mitral regurgitation is present.    TTE 9/2016  · All left ventricular wall segments contract normally.  · Left ventricular function is normal.  · Mild tricuspid valve regurgitation is present.  · Mild pulmonic valve regurgitation is present.  · Mild mitral valve regurgitation is present    Assessment and Plan:   Cristela was seen today for hypertension.    Diagnoses and all orders for this visit:    Paroxysmal atrial fibrillation  -In normal sinus rhythm today, QRS/QTc stable  -Continue sotalol and Xarelto     Non-rheumatic mitral regurgitation  -Trace mitral regurgitation noted on 5/2019 per TTE, no evidence of MVP.     Essential hypertension  -Typically at goal, continue current related medications.     Tobacco dependence  -Strongly urged the patient to quit smoking again   -It has been difficult to resist tobacco products at her home because her significant other also smokes    - Return in about 6 months (around 10/28/2020) for Next scheduled follow up and EKG.      Adarsh Davidson MD, MSc, [facility]  Interventional Cardiology  [facility] at [facility]

## 2020-04-28 ENCOUNTER — OFFICE VISIT (OUTPATIENT)
Dept: CARDIOLOGY | Facility: CLINIC | Age: 60
End: 2020-04-28

## 2020-04-28 VITALS
BODY MASS INDEX: 25.91 KG/M2 | SYSTOLIC BLOOD PRESSURE: 142 MMHG | DIASTOLIC BLOOD PRESSURE: 90 MMHG | WEIGHT: 181 LBS | HEIGHT: 70 IN | HEART RATE: 80 BPM

## 2020-04-28 DIAGNOSIS — I10 ESSENTIAL HYPERTENSION: ICD-10-CM

## 2020-04-28 DIAGNOSIS — I48.0 PAROXYSMAL ATRIAL FIBRILLATION (HCC): Primary | ICD-10-CM

## 2020-04-28 DIAGNOSIS — I34.0 NON-RHEUMATIC MITRAL REGURGITATION: ICD-10-CM

## 2020-04-28 PROCEDURE — 99213 OFFICE O/P EST LOW 20 MIN: CPT | Performed by: INTERNAL MEDICINE

## 2020-04-28 PROCEDURE — 93000 ELECTROCARDIOGRAM COMPLETE: CPT | Performed by: INTERNAL MEDICINE

## 2020-05-14 ENCOUNTER — TELEPHONE (OUTPATIENT)
Dept: INTERNAL MEDICINE | Facility: CLINIC | Age: 60
End: 2020-05-14

## 2020-05-14 NOTE — TELEPHONE ENCOUNTER
PT CALLED IN STATED SHE HAS AN ABSCESS ON HER JAW AND SHE IS REQUESTING AN ANTIBIOTIC TO BE SENT TO THE PHARM FOR TX PLEASE ADVISE      PT CB NUMBER: [phone]   [facility]: [facility]  [phone]

## 2020-05-15 ENCOUNTER — TELEPHONE (OUTPATIENT)
Dept: INTERNAL MEDICINE | Facility: CLINIC | Age: 60
End: 2020-05-15

## 2020-05-15 RX ORDER — AMOXICILLIN 500 MG/1
1000 CAPSULE ORAL 3 TIMES DAILY
Qty: 21 CAPSULE | Refills: 0 | Status: SHIPPED | OUTPATIENT
Start: 2020-05-15 | End: 2020-08-12

## 2020-05-15 NOTE — TELEPHONE ENCOUNTER
Ok to call in amoxicillin 500 TID x 7 days.      Dentist offices are now opening back up.  Antibiotics are a short term fix.  She needs to see a dentist.

## 2020-06-15 DIAGNOSIS — I10 ESSENTIAL HYPERTENSION: ICD-10-CM

## 2020-06-15 RX ORDER — AMLODIPINE BESYLATE 10 MG/1
TABLET ORAL
Qty: 30 TABLET | Refills: 0 | Status: SHIPPED | OUTPATIENT
Start: 2020-06-15 | End: 2020-07-06

## 2020-06-15 RX ORDER — SOTALOL HYDROCHLORIDE 80 MG/1
TABLET ORAL
Qty: 60 TABLET | Refills: 0 | Status: SHIPPED | OUTPATIENT
Start: 2020-06-15 | End: 2020-07-14 | Stop reason: SDUPTHER

## 2020-07-01 RX ORDER — RIVAROXABAN 20 MG/1
TABLET, FILM COATED ORAL
Qty: 90 TABLET | Refills: 0 | Status: SHIPPED | OUTPATIENT
Start: 2020-07-01 | End: 2020-07-06

## 2020-07-06 DIAGNOSIS — I10 ESSENTIAL HYPERTENSION: ICD-10-CM

## 2020-07-06 RX ORDER — AMLODIPINE BESYLATE 10 MG/1
TABLET ORAL
Qty: 30 TABLET | Refills: 0 | Status: SHIPPED | OUTPATIENT
Start: 2020-07-06 | End: 2020-07-14 | Stop reason: SDUPTHER

## 2020-07-06 RX ORDER — RIVAROXABAN 20 MG/1
TABLET, FILM COATED ORAL
Qty: 90 TABLET | Refills: 0 | Status: SHIPPED | OUTPATIENT
Start: 2020-07-06 | End: 2020-07-14 | Stop reason: SDUPTHER

## 2020-07-14 DIAGNOSIS — I10 ESSENTIAL HYPERTENSION: ICD-10-CM

## 2020-07-14 RX ORDER — SOTALOL HYDROCHLORIDE 80 MG/1
80 TABLET ORAL 2 TIMES DAILY
Qty: 60 TABLET | Refills: 5 | Status: SHIPPED | OUTPATIENT
Start: 2020-07-14 | End: 2021-01-18

## 2020-07-14 RX ORDER — AMLODIPINE BESYLATE 10 MG/1
10 TABLET ORAL DAILY
Qty: 30 TABLET | Refills: 5 | Status: SHIPPED | OUTPATIENT
Start: 2020-07-14 | End: 2021-02-11

## 2020-07-14 NOTE — TELEPHONE ENCOUNTER
Pt needs more refills till her f/u with [facility]. She was transferred to [facility] to schedule a f/u in October per [facility] KATHY in April. Refills given till then sent to [facility] in [place] per pt request.

## 2020-08-12 ENCOUNTER — OFFICE VISIT (OUTPATIENT)
Dept: INTERNAL MEDICINE | Facility: CLINIC | Age: 60
End: 2020-08-12

## 2020-08-12 ENCOUNTER — RESULTS ENCOUNTER (OUTPATIENT)
Dept: INTERNAL MEDICINE | Facility: CLINIC | Age: 60
End: 2020-08-12

## 2020-08-12 VITALS
HEIGHT: 70 IN | DIASTOLIC BLOOD PRESSURE: 84 MMHG | WEIGHT: 184 LBS | RESPIRATION RATE: 16 BRPM | SYSTOLIC BLOOD PRESSURE: 138 MMHG | OXYGEN SATURATION: 100 % | HEART RATE: 77 BPM | BODY MASS INDEX: 26.34 KG/M2 | TEMPERATURE: 97.5 F

## 2020-08-12 DIAGNOSIS — Z72.0 TOBACCO ABUSE: ICD-10-CM

## 2020-08-12 DIAGNOSIS — Z11.59 ENCOUNTER FOR HEPATITIS C SCREENING TEST FOR LOW RISK PATIENT: ICD-10-CM

## 2020-08-12 DIAGNOSIS — Z00.00 ENCOUNTER FOR PREVENTIVE HEALTH EXAMINATION: Primary | ICD-10-CM

## 2020-08-12 DIAGNOSIS — J44.0 CHRONIC OBSTRUCTIVE PULMONARY DISEASE WITH ACUTE LOWER RESPIRATORY INFECTION (HCC): ICD-10-CM

## 2020-08-12 DIAGNOSIS — I10 ESSENTIAL HYPERTENSION: ICD-10-CM

## 2020-08-12 DIAGNOSIS — Z23 NEED FOR SHINGLES VACCINE: ICD-10-CM

## 2020-08-12 DIAGNOSIS — Z12.11 SCREEN FOR COLON CANCER: ICD-10-CM

## 2020-08-12 DIAGNOSIS — E87.6 HYPOKALEMIA: ICD-10-CM

## 2020-08-12 DIAGNOSIS — Z12.31 SCREENING MAMMOGRAM, ENCOUNTER FOR: ICD-10-CM

## 2020-08-12 PROCEDURE — 99396 PREV VISIT EST AGE 40-64: CPT | Performed by: INTERNAL MEDICINE

## 2020-08-12 NOTE — PROGRESS NOTES
Chief Complaint   Patient presents with   • Extremity Weakness     pt having pain in arms and leg feel very weak, getting worse-pt says she feels this way when her potassium gets low   • Annual Exam     physical-lab work       Subjective     History of Present Illness   Cristela Stratton is a 60 y.o. female presenting for annual physical.  Preventive health maintenance was reviewed and discussed today. Vaccines were updated. Has gained 13 lbs as she stays at home now, not working at [employer] anymore.     Feels like her potassium levels are low as she feels fatigued and weak all over.  Has had low levels in the past.      Continues to smoke 1ppd now for the last 25 years. Continues to follow with pulmonology. Inhalers are working well.      The following portions of the patient's history were reviewed and updated as appropriate: allergies, current medications, past family history, past medical history, past social history, past surgical history and problem list.    Review of Systems   Constitutional: Positive for fatigue. Negative for chills and fever.   HENT: Negative for congestion, ear pain, rhinorrhea, sinus pressure and sore throat.    Eyes: Negative for visual disturbance.   Respiratory: Negative for cough, chest tightness, shortness of breath and wheezing.    Cardiovascular: Negative for chest pain, palpitations and leg swelling.   Gastrointestinal: Negative for abdominal pain, blood in stool, constipation, diarrhea, nausea and vomiting.   Endocrine: Negative for polydipsia and polyuria.   Genitourinary: Negative for dysuria and hematuria.   Musculoskeletal: Negative for arthralgias and back pain.   Skin: Negative for rash.   Neurological: Positive for weakness. Negative for dizziness, light-headedness, numbness and headaches.   Psychiatric/Behavioral: Negative for dysphoric mood and sleep disturbance. The patient is not nervous/anxious.        No Known Allergies    Past Medical History:   Diagnosis Date   •  "Atrial fibrillation (CMS/HCC)     CHADS-VASC = 2   • Hypertension        Social History     Socioeconomic History   • Marital status:      Spouse name: Not on file   • Number of children: Not on file   • Years of education: Not on file   • Highest education level: Not on file   Tobacco Use   • Smoking status: Current Every Day Smoker     Packs/day: 1.00     Years: 22.00     Pack years: 22.00     Types: Cigarettes   • Smokeless tobacco: Never Used   Substance and Sexual Activity   • Alcohol use: No   • Drug use: No   • Sexual activity: Never        No past surgical history on file.    Family History   Problem Relation Age of Onset   • Cancer Mother    • Cancer Maternal Aunt    • Alcohol abuse Maternal Grandfather    • Heart disease Maternal Grandfather          Current Outpatient Medications:   •  acetaminophen (TYLENOL) 500 MG tablet, Take 500 mg by mouth Every 6 (Six) Hours As Needed for mild pain (1-3)., Disp: , Rfl:   •  albuterol sulfate  (90 Base) MCG/ACT inhaler, Inhale 2 puffs Every 4 (Four) Hours As Needed for Wheezing., Disp: 1 inhaler, Rfl: 2  •  amLODIPine (NORVASC) 10 MG tablet, Take 1 tablet by mouth Daily., Disp: 30 tablet, Rfl: 5  •  hydroCHLOROthiazide (HYDRODIURIL) 12.5 MG tablet, TAKE ONE TABLET BY MOUTH DAILY, Disp: 30 tablet, Rfl: 2  •  ipratropium-albuterol (DUO-NEB) 0.5-2.5 mg/3 ml nebulizer, Take 3 mL by nebulization Every 4 (Four) Hours As Needed for Wheezing., Disp: 360 mL, Rfl: 1  •  rivaroxaban (Xarelto) 20 MG tablet, Take 1 tablet by mouth Daily., Disp: 90 tablet, Rfl: 1  •  sotalol (BETAPACE) 80 MG tablet, Take 1 tablet by mouth 2 (Two) Times a Day., Disp: 60 tablet, Rfl: 5    Objective   /84   Pulse 77   Temp 97.5 °F (36.4 °C)   Resp 16   Ht 177.8 cm (70\")   Wt 83.5 kg (184 lb)   LMP  (LMP Unknown)   SpO2 100%   BMI 26.40 kg/m²     Physical Exam   Constitutional: She is oriented to person, place, and time. She appears well-developed and well-nourished. No " distress.   HENT:   Head: Normocephalic and atraumatic.   Right Ear: External ear normal.   Left Ear: External ear normal.   Nose: Nose normal.   Mouth/Throat: Oropharynx is clear and moist. No oropharyngeal exudate.   Eyes: Pupils are equal, round, and reactive to light. Conjunctivae and EOM are normal. No scleral icterus.   Neck: Normal range of motion. Neck supple. No JVD present. No thyromegaly present.   Cardiovascular: Normal rate, regular rhythm and normal heart sounds.   Pulmonary/Chest: Effort normal and breath sounds normal. No stridor. No respiratory distress. She has no wheezes.   Abdominal: Soft. Bowel sounds are normal. She exhibits no distension and no mass. There is no tenderness. There is no guarding.   Genitourinary:   Genitourinary Comments: Deferred   Musculoskeletal: Normal range of motion. She exhibits no edema or tenderness.   Lymphadenopathy:     She has no cervical adenopathy.   Neurological: She is alert and oriented to person, place, and time. No cranial nerve deficit.   Skin: Skin is warm and dry.   Psychiatric: She has a normal mood and affect. Her behavior is normal. Judgment and thought content normal.   Nursing note and vitals reviewed.      Assessment/Plan   Cristela was seen today for extremity weakness and annual exam.    Diagnoses and all orders for this visit:    Encounter for preventive health examination  -     CBC & Differential  -     Comprehensive Metabolic Panel  -     Lipid Panel    Essential hypertension  -     CBC & Differential  -     Comprehensive Metabolic Panel  -     Lipid Panel    Tobacco abuse  -     CBC & Differential  -     Comprehensive Metabolic Panel  -     Lipid Panel    Hypokalemia  -     CBC & Differential  -     Comprehensive Metabolic Panel  -     Lipid Panel    Chronic obstructive pulmonary disease with acute lower respiratory infection (CMS/HCC)    Screen for colon cancer  -     Cologuard - Stool, Per Rectum; Future    Screening mammogram, encounter  for  -     Mammo screening digital tomosynthesis bilateral w CAD; Future    Encounter for hepatitis C screening test for low risk patient  -     Hepatitis Panel, Acute    Need for shingles vaccine  -     Zoster Vac Recomb Adjuvanted 50 MCG/0.5ML reconstituted suspension; Inject 0.5 mL into the appropriate muscle as directed by prescriber 1 (One) Time for 1 dose.          Discussion Summary:  Patient is a 60 y.o. female presenting for annual physical    1. Preventive Health Maintenance  - Baseline labs are up-to-date or ordered per above.  - Vaccines reviewed and updated  - Preventive health measures were discussed including: healthy diet with increase in fruits and vegetables, regular exercise at least 3 times a week, safe sex practices, avoidance of drugs, tobacco, and alcohol, and regular seatbelt use.    2. Generalized fatigue / hypokalemia  - prior episodes have been due to low K, consider d/c diuretics, change to ACEI after review of labs.     3. Tobacco abuse  - once again encouraged to quit, risks of smoking were discussed.     4. COPD  - stable, following with pulmonology    5. HTN - stable.     Follow up:  No follow-ups on file.     Patient Instructions:  Patient instructions were provided.

## 2020-08-13 ENCOUNTER — TELEPHONE (OUTPATIENT)
Dept: INTERNAL MEDICINE | Facility: CLINIC | Age: 60
End: 2020-08-13

## 2020-08-13 DIAGNOSIS — E78.2 MIXED HYPERLIPIDEMIA: Primary | ICD-10-CM

## 2020-08-13 LAB
ALBUMIN SERPL-MCNC: 4.4 G/DL (ref 3.5–5.2)
ALBUMIN/GLOB SERPL: 1.7 G/DL
ALP SERPL-CCNC: 96 U/L (ref 39–117)
ALT SERPL-CCNC: 9 U/L (ref 1–33)
AST SERPL-CCNC: 11 U/L (ref 1–32)
BASOPHILS # BLD AUTO: 0.04 10*3/MM3 (ref 0–0.2)
BASOPHILS NFR BLD AUTO: 0.8 % (ref 0–1.5)
BILIRUB SERPL-MCNC: 0.7 MG/DL (ref 0–1.2)
BUN SERPL-MCNC: 8 MG/DL (ref 8–23)
BUN/CREAT SERPL: 10.5 (ref 7–25)
CALCIUM SERPL-MCNC: 9.2 MG/DL (ref 8.6–10.5)
CHLORIDE SERPL-SCNC: 98 MMOL/L (ref 98–107)
CHOLEST SERPL-MCNC: 211 MG/DL (ref 0–200)
CO2 SERPL-SCNC: 27.1 MMOL/L (ref 22–29)
CREAT SERPL-MCNC: 0.76 MG/DL (ref 0.57–1)
EOSINOPHIL # BLD AUTO: 0.26 10*3/MM3 (ref 0–0.4)
EOSINOPHIL NFR BLD AUTO: 4.9 % (ref 0.3–6.2)
ERYTHROCYTE [DISTWIDTH] IN BLOOD BY AUTOMATED COUNT: 12.9 % (ref 12.3–15.4)
GLOBULIN SER CALC-MCNC: 2.6 GM/DL
GLUCOSE SERPL-MCNC: 91 MG/DL (ref 65–99)
HAV IGM SERPL QL IA: NEGATIVE
HBV CORE IGM SERPL QL IA: NEGATIVE
HBV SURFACE AG SERPL QL IA: NEGATIVE
HCT VFR BLD AUTO: 38.7 % (ref 34–46.6)
HCV AB S/CO SERPL IA: <0.1 S/CO RATIO (ref 0–0.9)
HDLC SERPL-MCNC: 35 MG/DL (ref 40–60)
HGB BLD-MCNC: 13.3 G/DL (ref 12–15.9)
IMM GRANULOCYTES # BLD AUTO: 0.02 10*3/MM3 (ref 0–0.05)
IMM GRANULOCYTES NFR BLD AUTO: 0.4 % (ref 0–0.5)
LDLC SERPL CALC-MCNC: 144 MG/DL (ref 0–100)
LYMPHOCYTES # BLD AUTO: 1.88 10*3/MM3 (ref 0.7–3.1)
LYMPHOCYTES NFR BLD AUTO: 35.5 % (ref 19.6–45.3)
MCH RBC QN AUTO: 30.1 PG (ref 26.6–33)
MCHC RBC AUTO-ENTMCNC: 34.4 G/DL (ref 31.5–35.7)
MCV RBC AUTO: 87.6 FL (ref 79–97)
MONOCYTES # BLD AUTO: 0.49 10*3/MM3 (ref 0.1–0.9)
MONOCYTES NFR BLD AUTO: 9.3 % (ref 5–12)
NEUTROPHILS # BLD AUTO: 2.6 10*3/MM3 (ref 1.7–7)
NEUTROPHILS NFR BLD AUTO: 49.1 % (ref 42.7–76)
NRBC BLD AUTO-RTO: 0 /100 WBC (ref 0–0.2)
PLATELET # BLD AUTO: 237 10*3/MM3 (ref 140–450)
POTASSIUM SERPL-SCNC: 3.9 MMOL/L (ref 3.5–5.2)
PROT SERPL-MCNC: 7 G/DL (ref 6–8.5)
RBC # BLD AUTO: 4.42 10*6/MM3 (ref 3.77–5.28)
SODIUM SERPL-SCNC: 137 MMOL/L (ref 136–145)
TRIGL SERPL-MCNC: 159 MG/DL (ref 0–150)
VLDLC SERPL CALC-MCNC: 31.8 MG/DL
WBC # BLD AUTO: 5.29 10*3/MM3 (ref 3.4–10.8)

## 2020-08-13 RX ORDER — ATORVASTATIN CALCIUM 10 MG/1
10 TABLET, FILM COATED ORAL NIGHTLY
Qty: 30 TABLET | Refills: 5 | Status: SHIPPED | OUTPATIENT
Start: 2020-08-13 | End: 2021-02-11

## 2020-08-13 NOTE — TELEPHONE ENCOUNTER
Contacted patient and notified of lab results; patient is concerned about generalized leg pain that worsens at night and wishes to have provider advise on a treatment plan.      Please advise and I will contact patient

## 2020-08-13 NOTE — PROGRESS NOTES
Let the patient know that her labs were all reviewed.  All are in normal range except for cholesterol which is elevated.  Based on the test, I would recommend a low-dose statin.  This can be called in if she is agreeable to taking the medication.

## 2020-08-14 DIAGNOSIS — G25.81 RLS (RESTLESS LEGS SYNDROME): ICD-10-CM

## 2020-08-14 DIAGNOSIS — I73.9 CLAUDICATION (HCC): Primary | ICD-10-CM

## 2020-08-14 RX ORDER — ROPINIROLE 0.5 MG/1
0.5 TABLET, FILM COATED ORAL NIGHTLY
Qty: 30 TABLET | Refills: 2 | Status: SHIPPED | OUTPATIENT
Start: 2020-08-14 | End: 2020-10-26

## 2020-08-14 NOTE — TELEPHONE ENCOUNTER
Contacted patient and notified her that MACIEJ had been ordered to test the circulation of her extremities and patient stated that she would like to try medication to help relieve symptoms at night.

## 2020-08-14 NOTE — TELEPHONE ENCOUNTER
I would like to see how her circulation is in her extremities. MACIEJ test was ordered.  Also for night time symptoms, we can try requip to help with the symptoms and to help with sleeping.

## 2020-08-24 ENCOUNTER — HOSPITAL ENCOUNTER (OUTPATIENT)
Dept: ULTRASOUND IMAGING | Facility: HOSPITAL | Age: 60
End: 2020-08-24

## 2020-08-25 ENCOUNTER — HOSPITAL ENCOUNTER (OUTPATIENT)
Dept: ULTRASOUND IMAGING | Facility: HOSPITAL | Age: 60
Discharge: HOME | End: 2020-08-25
Admitting: INTERNAL MEDICINE

## 2020-08-25 DIAGNOSIS — I73.9 CLAUDICATION (HCC): ICD-10-CM

## 2020-08-25 PROCEDURE — 93923 UPR/LXTR ART STDY 3+ LVLS: CPT

## 2020-08-26 NOTE — PROGRESS NOTES
MACIEJ studies suggest mild arterial disease.  She can discuss this further with cardiology at her follow up.

## 2020-08-27 ENCOUNTER — TELEPHONE (OUTPATIENT)
Dept: INTERNAL MEDICINE | Facility: CLINIC | Age: 60
End: 2020-08-27

## 2020-08-27 NOTE — TELEPHONE ENCOUNTER
PATIENT CALLING TO CHECK THE STATUS OF HER ULTRASOUND RESULTS. PATIENT HAD ULTRASOUND DONE ON Tuesday     PLEASE CALL AND ADVISE -[phone]

## 2020-08-29 DIAGNOSIS — I10 ESSENTIAL HYPERTENSION: ICD-10-CM

## 2020-08-31 ENCOUNTER — TELEPHONE (OUTPATIENT)
Dept: CARDIOLOGY | Facility: CLINIC | Age: 60
End: 2020-08-31

## 2020-08-31 RX ORDER — HYDROCHLOROTHIAZIDE 12.5 MG/1
TABLET ORAL
Qty: 30 TABLET | Refills: 4 | Status: SHIPPED | OUTPATIENT
Start: 2020-08-31 | End: 2021-03-05 | Stop reason: SDUPTHER

## 2020-08-31 NOTE — TELEPHONE ENCOUNTER
With her ABIs only showing a very mild decrease, mild plaque is likely only a small contributing factor to her leg pains.  Would be best treated with medicines (such as the cholesterol medicine she is on) and lifestyle modifications.  Would recommend smoking cessation if she is still smoking and regular forms of exercise including regular long distance walks.  Follow-up as previously scheduled on 10/26

## 2020-08-31 NOTE — TELEPHONE ENCOUNTER
"Patient had MACIEJ with PCP that suggested \"mild lower extremity arterial disease\". Patient is symptomatic,reporting that her legs hurt and tingle. She is not currently having trouble walking or completing daily activity. She was told to reach out to office by Dr. Licea for further recommendations.     Patient has a f/u appt 10/26/20. Would you like to see her sooner or do any additional testing prior to follow up?    "

## 2020-10-03 DIAGNOSIS — J44.9 CHRONIC OBSTRUCTIVE PULMONARY DISEASE, UNSPECIFIED COPD TYPE (HCC): ICD-10-CM

## 2020-10-05 RX ORDER — TIOTROPIUM BROMIDE AND OLODATEROL 3.124; 2.736 UG/1; UG/1
SPRAY, METERED RESPIRATORY (INHALATION)
Qty: 4 G | Refills: 6 | Status: SHIPPED | OUTPATIENT
Start: 2020-10-05 | End: 2021-01-14 | Stop reason: SDUPTHER

## 2020-10-26 ENCOUNTER — OFFICE VISIT (OUTPATIENT)
Dept: CARDIOLOGY | Facility: CLINIC | Age: 60
End: 2020-10-26

## 2020-10-26 VITALS
WEIGHT: 183 LBS | SYSTOLIC BLOOD PRESSURE: 134 MMHG | HEART RATE: 70 BPM | TEMPERATURE: 96.9 F | HEIGHT: 70 IN | OXYGEN SATURATION: 98 % | BODY MASS INDEX: 26.2 KG/M2 | DIASTOLIC BLOOD PRESSURE: 76 MMHG

## 2020-10-26 DIAGNOSIS — I48.0 PAROXYSMAL ATRIAL FIBRILLATION (HCC): Primary | ICD-10-CM

## 2020-10-26 DIAGNOSIS — I10 ESSENTIAL HYPERTENSION: ICD-10-CM

## 2020-10-26 DIAGNOSIS — F17.200 TOBACCO DEPENDENCE: ICD-10-CM

## 2020-10-26 DIAGNOSIS — I34.0 NON-RHEUMATIC MITRAL REGURGITATION: ICD-10-CM

## 2020-10-26 PROCEDURE — 93000 ELECTROCARDIOGRAM COMPLETE: CPT | Performed by: INTERNAL MEDICINE

## 2020-10-26 PROCEDURE — 99213 OFFICE O/P EST LOW 20 MIN: CPT | Performed by: INTERNAL MEDICINE

## 2020-10-26 NOTE — PROGRESS NOTES
[facility] AT [address], [address]  [place], [place], [address] [phone]  [facility]           OUTPATIENT CLINIC FOLLOW UP NOTE    Patient Care Team:  Patient Care Team:  Jluis Licea DO as PCP - General (Internal Medicine)    Subjective:   Reason for consultation:   Chief Complaint   Patient presents with   • Paroxysmal atrial fibrillation (CMS/HCC)       HPI:    Cristela Stratton is a 60 y.o. female.  Cardiac problem list:  1. Paroxysmal atrial fibrillation  2. Mild mitral regurgitation  3. Hypertension  4. PAD  1. ABIs 8/25/2020: Right MACIEJ 0.86 at the anterior tibial and 0.98 at the posterior tibial.  Left MACIEJ 0.93 in the posterior tibial MACIEJ is 1.02  5. Tobacco dependence, quit in 2018 but restarted in late 2019    She presents for follow-up.    Since patient was last seen she reports that she has been doing well from a cardiac standpoint.  She denies any chest pain, shortness of breath.  She has rare palpitations.  She has been started on atorvastatin due to mild PAD.    Review of Systems:  Positive for rare palpitations  Negative for exertional chest pain, dyspnea with exertion, orthopnea, PND, lightheadedness, syncope.     PFSH:  Patient Active Problem List   Diagnosis   • Tobacco abuse   • Essential hypertension   • Bronchitis   • Atrial fibrillation (CMS/HCC)   • Non-rheumatic mitral regurgitation   • COPD (chronic obstructive pulmonary disease) (CMS/HCC)   • Subclinical hyperthyroidism   • Lower extremity edema         Current Outpatient Medications:   •  acetaminophen (TYLENOL) 500 MG tablet, Take 500 mg by mouth Every 6 (Six) Hours As Needed for mild pain (1-3)., Disp: , Rfl:   •  albuterol sulfate  (90 Base) MCG/ACT inhaler, Inhale 2 puffs Every 4 (Four) Hours As Needed for Wheezing., Disp: 1 inhaler, Rfl: 2  •  amLODIPine (NORVASC) 10 MG tablet, Take 1 tablet by mouth Daily., Disp: 30 tablet, Rfl: 5  •  atorvastatin (LIPITOR) 10 MG  "tablet, Take 1 tablet by mouth Every Night., Disp: 30 tablet, Rfl: 5  •  hydroCHLOROthiazide (HYDRODIURIL) 12.5 MG tablet, TAKE ONE TABLET BY MOUTH DAILY, Disp: 30 tablet, Rfl: 4  •  ipratropium-albuterol (DUO-NEB) 0.5-2.5 mg/3 ml nebulizer, Take 3 mL by nebulization Every 4 (Four) Hours As Needed for Wheezing., Disp: 360 mL, Rfl: 1  •  rivaroxaban (Xarelto) 20 MG tablet, Take 1 tablet by mouth Daily., Disp: 90 tablet, Rfl: 1  •  sotalol (BETAPACE) 80 MG tablet, Take 1 tablet by mouth 2 (Two) Times a Day., Disp: 60 tablet, Rfl: 5  •  Stiolto Respimat 2.5-2.5 MCG/ACT aerosol solution inhaler, INHALE 2 PUFFS BY MOUTH ONCE DAILY FOR 30 DAYS, Disp: 4 g, Rfl: 6    No Known Allergies     reports that she has been smoking cigarettes. She has a 22.00 pack-year smoking history. She has never used smokeless tobacco.    Family History   Problem Relation Age of Onset   • Cancer Mother    • Cancer Maternal Aunt    • Alcohol abuse Maternal Grandfather    • Heart disease Maternal Grandfather        Objective:   Blood pressure 134/76, pulse 70, temperature 96.9 °F (36.1 °C), height 177.8 cm (70\"), weight 83 kg (183 lb), SpO2 98 %.  CONSTITUTIONAL: No acute distress  RESPIRATORY: Normal effort. Clear to auscultation bilaterally without wheezing or rales  CARDIOVASCULAR: Regular rate and rhythm with normal S1 and S2. Without murmur, gallop or rub.  PERIPHERAL VASCULAR: Normal radial pulse. There is no lower extremity edema bilaterally.  PSYCH: Normal affect and mood      Labs:  Lab Results   Component Value Date    ALT 9 08/12/2020    AST 11 08/12/2020     Lab Results   Component Value Date    TRIG 159 (H) 08/12/2020    HDL 35 (L) 08/12/2020    CREATININE 0.76 08/12/2020       Diagnostic Data:      ECG 12 Lead    Date/Time: 10/26/2020 10:49 AM  Performed by: Adarsh Davidson MD  Authorized by: Adarsh Davidson MD   Comparison: compared with previous ECG from 4/28/2020  Similar to previous ECG  Rhythm: sinus rhythm  Rate: normal  BPM: " 70  Comments: QRS 90 ms   ms            14-day Zio Patch 9/2018  -No A. fib, longest SVT 11 beats.  -Patient triggered events were all normal sinus rhythm    TTE 5/2019  · Left ventricular systolic function is normal. Estimated EF = 60%.  · Left ventricular diastolic function is normal.  · There is no evidence of mitral valve prolapse. Only trace mitral regurgitation is present.    TTE 9/2016  · All left ventricular wall segments contract normally.  · Left ventricular function is normal.  · Mild tricuspid valve regurgitation is present.  · Mild pulmonic valve regurgitation is present.  · Mild mitral valve regurgitation is present    Assessment and Plan:   Cristela was seen today for hypertension.    Diagnoses and all orders for this visit:    Paroxysmal atrial fibrillation  -Remains in normal sinus rhythm.  QRS/QTc stable.  -Continue sotalol and Xarelto.     Non-rheumatic mitral regurgitation  -Trace mitral regurgitation noted on 5/2019 per TTE, no evidence of MVP.  -Continue to monitor clinically     Essential hypertension  -Stable  -Continue current related medications.     Tobacco dependence  Cristela Stratton  reports that she has been smoking cigarettes. She has a 22.00 pack-year smoking history. She has never used smokeless tobacco.. I have educated her on the risk of diseases from using tobacco products such as cancer, COPD and heart disease.     I advised her to quit and she is Not Willing to Quit Tobacco Products    I spent 3  minutes counseling the patient.       - Return in about 6 months (around 4/26/2021) for Next scheduled follow up with an EKG..      ABIMBOLA Salcedo  10/26/20 10:57 EDT

## 2021-01-07 RX ORDER — ALBUTEROL SULFATE 90 UG/1
2 AEROSOL, METERED RESPIRATORY (INHALATION) EVERY 4 HOURS PRN
Qty: 18 G | Refills: 1 | Status: CANCELLED | OUTPATIENT
Start: 2021-01-07

## 2021-01-07 RX ORDER — ALBUTEROL SULFATE 90 UG/1
2 AEROSOL, METERED RESPIRATORY (INHALATION) EVERY 4 HOURS PRN
Qty: 18 G | Refills: 11 | Status: SHIPPED | OUTPATIENT
Start: 2021-01-07 | End: 2022-01-14 | Stop reason: SDUPTHER

## 2021-01-07 NOTE — TELEPHONE ENCOUNTER
Pt called today requesting refill on Rx Albuterol HFA needing to be sent to [facility]. Pt last seen on 1/13/20-pt scheduled for 1/14 @ 3:45pm.

## 2021-01-08 ENCOUNTER — TELEPHONE (OUTPATIENT)
Dept: INTERNAL MEDICINE | Facility: CLINIC | Age: 61
End: 2021-01-08

## 2021-01-08 NOTE — TELEPHONE ENCOUNTER
PATIENT CALLED TO SAY THAT FEET ARE BURNING AND TINGLING ALL THE TIME. THERE ARE RED BLOTCHED SPOTS AROUND HER ANKLES.    PLEASE ADVISE AND CALL PATIENT BACK [phone]

## 2021-01-14 ENCOUNTER — OFFICE VISIT (OUTPATIENT)
Dept: PULMONOLOGY | Facility: CLINIC | Age: 61
End: 2021-01-14

## 2021-01-14 VITALS
HEIGHT: 70 IN | SYSTOLIC BLOOD PRESSURE: 120 MMHG | RESPIRATION RATE: 18 BRPM | TEMPERATURE: 97.7 F | HEART RATE: 84 BPM | DIASTOLIC BLOOD PRESSURE: 82 MMHG | OXYGEN SATURATION: 96 % | BODY MASS INDEX: 26.4 KG/M2 | WEIGHT: 184.38 LBS

## 2021-01-14 DIAGNOSIS — J41.1 MUCOPURULENT CHRONIC BRONCHITIS (HCC): Primary | ICD-10-CM

## 2021-01-14 DIAGNOSIS — I10 ESSENTIAL HYPERTENSION: ICD-10-CM

## 2021-01-14 DIAGNOSIS — Z72.0 TOBACCO ABUSE: ICD-10-CM

## 2021-01-14 PROCEDURE — 99214 OFFICE O/P EST MOD 30 MIN: CPT | Performed by: INTERNAL MEDICINE

## 2021-01-14 RX ORDER — TIOTROPIUM BROMIDE AND OLODATEROL 3.124; 2.736 UG/1; UG/1
2 SPRAY, METERED RESPIRATORY (INHALATION) DAILY
Qty: 4 G | Refills: 11 | Status: SHIPPED | OUTPATIENT
Start: 2021-01-14 | End: 2021-10-29

## 2021-01-14 NOTE — PROGRESS NOTES
"Follow Up Office Note       Patient Name: Cristela Stratton    Referring Physician: No ref. provider found    Chief Complaint:    Chief Complaint   Patient presents with   • COPD     f/u       History of Present Illness: Cristela Stratton is a 60 y.o. female who is here today to follow-up care with Pulmonary. Patient has a past medical history significant for A. fib, hypertension, and COPD.  She is here today for follow-up she is in no exacerbations since her last visit.  She continues on the Stiolto in addition to that uses albuterol on as-needed basis couple times a week.  She continues to smoke 1 pack/day.  She notes that she started smoking in her early 30s currently 60 suspect that she would probably be very close to a 30-pack-year history at this point.  She had a CT scan of her chest in 2020.  No weight loss, cough, hemoptysis, or night sweats.    Review of Systems:   Review of Systems   Constitutional: Negative for chills, fatigue and fever.   HENT: Negative for congestion and voice change.    Eyes: Negative for blurred vision.   Respiratory: Negative for cough, shortness of breath and wheezing.    Cardiovascular: Negative for chest pain.   Skin: Negative for dry skin.   Hematological: Negative for adenopathy.   Psychiatric/Behavioral: Negative for agitation and depressed mood.       The following portions of the patient's history were reviewed and updated as appropriate: allergies, current medications, past family history, past medical history, past social history, past surgical history and problem list.    Physical Exam:  Vital Signs:   Vitals:    01/14/21 1542   BP: 120/82   BP Location: Right arm   Patient Position: Sitting   Cuff Size: Adult   Pulse: 84   Resp: 18   Temp: 97.7 °F (36.5 °C)   SpO2: 96%  Comment: room air at rest   Weight: 83.6 kg (184 lb 6 oz)   Height: 177.8 cm (70\")       Physical Exam  Vitals signs and nursing note reviewed.   Constitutional:       General: She is not in acute " distress.     Appearance: She is well-developed and normal weight. She is not ill-appearing or toxic-appearing.   HENT:      Head: Normocephalic and atraumatic.   Cardiovascular:      Rate and Rhythm: Normal rate and regular rhythm.      Pulses: Normal pulses.      Heart sounds: Normal heart sounds. No murmur. No friction rub. No gallop.    Pulmonary:      Effort: Pulmonary effort is normal. No respiratory distress.      Breath sounds: Normal breath sounds. No wheezing, rhonchi or rales.   Musculoskeletal:      Right lower leg: No edema.      Left lower leg: No edema.   Skin:     General: Skin is warm and dry.   Neurological:      Mental Status: She is alert and oriented to person, place, and time.         Results Review:   - imaging from  CT of the chest January 2020 which shows bilateral patchy groundglass opacities mostly in the right upper right middle and lingula, no masses or nodules are seen.  - PFT from 1/13/2020 which showed moderate obstruction without restriction and a mildly reduced DLCO at 60%, FEV1 was noted to be 63%.  - Echo report from 4/29/2019 showed a left ventricular systolic function was normal and EF of 60%, with normal diastolic function and no evidence of mitral valve prolapse..    Assessment / Plan:   1. Mucopurulent chronic bronchitis (CMS/HCC) (Primary)  -Patient's COPD is currently fairly well controlled he has gold stage III class B COPD.  Elective ending continue on the Stiolto I refilled his medication today.  Patient does have also refilled her albuterol inhaler.  She should continue both.  I will plan to start lung cancer screening next year, she will have a 30-year pack year history at that time.  I will schedule this after her visit next year.    2. Essential hypertension  -Blood pressure is excellently controlled, 120/82 today, continue her current regimen.    3. Tobacco abuse  -I extensively discussed with the patient tobacco cessation but she is not ready for it at this point  in time.  She has a lot of stress with her  who has liver cancer and is unable to stop at this time.  I encouraged her that when the stress times to calm down please let us know we will be happy to start treating her for possible tobacco cessation no issues or take care of her self.  She verbalized    Level of service justified based on 35 minutes spent in patient care on this date of service including, but not limited to: preparing to see the patient, obtaining and/or reviewing history, performing medically appropriate examination, ordering tests/medicine/procedures, independently interpreting results, documenting clinical information in EHR, and counseling/education of patient/family/caregiver. (Level 4 30-39 minutes; Level 5 40-54 minutes)      Follow Up:   Return in about 1 year (around 1/14/2022).       WINNIE Hernandez,   Pulmonary and Critical Care Medicine  Note Electronically Signed    Please note that portions of this note may have been completed with a voice recognition program. Efforts were made to edit the dictations, but occasionally words are mistranscribed.

## 2021-01-18 RX ORDER — SOTALOL HYDROCHLORIDE 80 MG/1
TABLET ORAL
Qty: 180 TABLET | Refills: 0 | Status: SHIPPED | OUTPATIENT
Start: 2021-01-18 | End: 2021-04-23

## 2021-02-02 ENCOUNTER — TELEPHONE (OUTPATIENT)
Dept: INTERNAL MEDICINE | Facility: CLINIC | Age: 61
End: 2021-02-02

## 2021-02-02 NOTE — TELEPHONE ENCOUNTER
PT STATED THAT SHE HAS AN ABSCESSED TOOTH. PT WOULD LIKE TO REQUEST AN ANTIBIOTIC TO HELP WITH HER SYMPTOMS.    CALL BACK:[phone]    PHARMACY:[facility] [address] [place], [place] [address] - [phone] [facility] [phone]   [phone]

## 2021-02-03 ENCOUNTER — OFFICE VISIT (OUTPATIENT)
Dept: INTERNAL MEDICINE | Facility: CLINIC | Age: 61
End: 2021-02-03

## 2021-02-03 VITALS
OXYGEN SATURATION: 99 % | TEMPERATURE: 97.5 F | HEIGHT: 70 IN | RESPIRATION RATE: 18 BRPM | SYSTOLIC BLOOD PRESSURE: 143 MMHG | BODY MASS INDEX: 26.34 KG/M2 | WEIGHT: 184 LBS | HEART RATE: 83 BPM | DIASTOLIC BLOOD PRESSURE: 77 MMHG

## 2021-02-03 DIAGNOSIS — E78.49 OTHER HYPERLIPIDEMIA: ICD-10-CM

## 2021-02-03 DIAGNOSIS — K04.7 DENTAL ABSCESS: Primary | ICD-10-CM

## 2021-02-03 DIAGNOSIS — E03.8 OTHER SPECIFIED HYPOTHYROIDISM: ICD-10-CM

## 2021-02-03 PROCEDURE — 99214 OFFICE O/P EST MOD 30 MIN: CPT | Performed by: INTERNAL MEDICINE

## 2021-02-03 RX ORDER — AMOXICILLIN AND CLAVULANATE POTASSIUM 875; 125 MG/1; MG/1
1 TABLET, FILM COATED ORAL EVERY 12 HOURS SCHEDULED
Qty: 14 TABLET | Refills: 0 | Status: SHIPPED | OUTPATIENT
Start: 2021-02-03 | End: 2021-05-17

## 2021-02-03 NOTE — PROGRESS NOTES
Chief Complaint   Patient presents with   • Abscess     lower right jaw, started 5 days ago, swollen and pain   • Follow-up     Thyroid labs checked   • Numbness     tingling in both feet still a problem       Subjective     History of Present Illness   Cristela Stratton is a 60 y.o. female presenting for follow up with concerns of right lower jaw pain over the last 5 days.  Has noticed increased swelling and pain in the right lower jaw.  She shares she had a remnant bone after wisdom tooth removal.    She also requests check on thyroid function.        The following portions of the patient's history were reviewed and updated as appropriate: allergies, current medications, past family history, past medical history, past social history, past surgical history and problem list.    Review of Systems   Constitutional: Negative for chills, fatigue and fever.   HENT: Negative for congestion, ear pain, rhinorrhea, sinus pressure and sore throat.    Eyes: Negative for visual disturbance.   Respiratory: Negative for cough, chest tightness, shortness of breath and wheezing.    Cardiovascular: Negative for chest pain, palpitations and leg swelling.   Gastrointestinal: Negative for abdominal pain, blood in stool, constipation, diarrhea, nausea and vomiting.   Endocrine: Negative for polydipsia and polyuria.   Genitourinary: Negative for dysuria and hematuria.   Musculoskeletal: Negative for arthralgias and back pain.   Skin: Negative for rash.   Neurological: Negative for dizziness, light-headedness, numbness and headaches.   Psychiatric/Behavioral: Negative for dysphoric mood and sleep disturbance. The patient is not nervous/anxious.        No Known Allergies    Past Medical History:   Diagnosis Date   • Atrial fibrillation (CMS/HCC)     CHADS-VASC = 2   • COPD (chronic obstructive pulmonary disease) (CMS/[facility])    • Hypertension        Social History     Socioeconomic History   • Marital status:      Spouse name: Not on  "file   • Number of children: Not on file   • Years of education: Not on file   • Highest education level: Not on file   Tobacco Use   • Smoking status: Current Every Day Smoker     Packs/day: 1.00     Years: 22.00     Pack years: 22.00     Types: Cigarettes   • Smokeless tobacco: Never Used   Substance and Sexual Activity   • Alcohol use: No   • Drug use: No   • Sexual activity: Never        No past surgical history on file.    Family History   Problem Relation Age of Onset   • Cancer Mother    • Cancer Maternal Aunt    • Alcohol abuse Maternal Grandfather    • Heart disease Maternal Grandfather          Current Outpatient Medications:   •  acetaminophen (TYLENOL) 500 MG tablet, Take 500 mg by mouth Every 6 (Six) Hours As Needed for mild pain (1-3)., Disp: , Rfl:   •  albuterol sulfate  (90 Base) MCG/ACT inhaler, Inhale 2 puffs Every 4 (Four) Hours As Needed for Wheezing., Disp: 18 g, Rfl: 11  •  amLODIPine (NORVASC) 10 MG tablet, Take 1 tablet by mouth Daily., Disp: 30 tablet, Rfl: 5  •  atorvastatin (LIPITOR) 10 MG tablet, Take 1 tablet by mouth Every Night., Disp: 30 tablet, Rfl: 5  •  hydroCHLOROthiazide (HYDRODIURIL) 12.5 MG tablet, TAKE ONE TABLET BY MOUTH DAILY, Disp: 30 tablet, Rfl: 4  •  ipratropium-albuterol (DUO-NEB) 0.5-2.5 mg/3 ml nebulizer, Take 3 mL by nebulization Every 4 (Four) Hours As Needed for Wheezing., Disp: 360 mL, Rfl: 1  •  rivaroxaban (Xarelto) 20 MG tablet, Take 1 tablet by mouth Daily., Disp: 90 tablet, Rfl: 1  •  sotalol (BETAPACE) 80 MG tablet, Take 1 tablet by mouth twice daily, Disp: 180 tablet, Rfl: 0  •  tiotropium bromide-olodaterol (Stiolto Respimat) 2.5-2.5 MCG/ACT aerosol solution inhaler, Inhale 2 puffs Daily., Disp: 4 g, Rfl: 11  •  amoxicillin-clavulanate (Augmentin) 875-125 MG per tablet, Take 1 tablet by mouth Every 12 (Twelve) Hours., Disp: 14 tablet, Rfl: 0    Objective   /77   Pulse 83   Temp 97.5 °F (36.4 °C)   Resp 18   Ht 177.8 cm (70\")   Wt 83.5 kg " (184 lb)   LMP  (LMP Unknown)   SpO2 99%   BMI 26.40 kg/m²     Physical Exam  Vitals signs and nursing note reviewed.   Constitutional:       Appearance: Normal appearance. She is well-developed.   HENT:      Head: Normocephalic and atraumatic.   Eyes:      Extraocular Movements: Extraocular movements intact.      Conjunctiva/sclera: Conjunctivae normal.   Neck:      Musculoskeletal: Normal range of motion and neck supple.   Pulmonary:      Effort: Pulmonary effort is normal.   Skin:     General: Skin is warm and dry.      Findings: No rash.   Neurological:      General: No focal deficit present.      Mental Status: She is alert and oriented to person, place, and time.   Psychiatric:         Mood and Affect: Mood normal.         Behavior: Behavior normal.         Assessment/Plan   Diagnoses and all orders for this visit:    1. Dental abscess (Primary)  -     amoxicillin-clavulanate (Augmentin) 875-125 MG per tablet; Take 1 tablet by mouth Every 12 (Twelve) Hours.  Dispense: 14 tablet; Refill: 0    2. Other specified hypothyroidism  -     TSH    3. Other hyperlipidemia  -     Lipid Panel          Discussion Summary:  Patient is a 60 y.o. female presenting for follow up.    Dental abscess  Start amoxicillin.  Patient was again reminded of the importance of seeing a dentist for further work-up and treatment.  Recurrent infections can lead to serious conditions.    Hypothyroidism  -Check TSH.    Hyperlipidemia  -Check lipids.        Follow up:  No follow-ups on file.

## 2021-02-10 DIAGNOSIS — I10 ESSENTIAL HYPERTENSION: ICD-10-CM

## 2021-02-10 DIAGNOSIS — E78.2 MIXED HYPERLIPIDEMIA: ICD-10-CM

## 2021-02-11 RX ORDER — AMLODIPINE BESYLATE 10 MG/1
TABLET ORAL
Qty: 90 TABLET | Refills: 0 | Status: SHIPPED | OUTPATIENT
Start: 2021-02-11 | End: 2021-05-12

## 2021-02-11 RX ORDER — ATORVASTATIN CALCIUM 10 MG/1
TABLET, FILM COATED ORAL
Qty: 90 TABLET | Refills: 3 | Status: SHIPPED | OUTPATIENT
Start: 2021-02-11 | End: 2022-02-14

## 2021-03-05 DIAGNOSIS — I10 ESSENTIAL HYPERTENSION: ICD-10-CM

## 2021-03-05 NOTE — TELEPHONE ENCOUNTER
Caller: Cristela Stratton    Relationship: Self    Best call back number: [phone]    Medication needed:   Requested Prescriptions     Pending Prescriptions Disp Refills   • hydroCHLOROthiazide (HYDRODIURIL) 12.5 MG tablet 30 tablet 4     Sig: Take 1 tablet by mouth Daily.       When do you need the refill by: 03/05/21    What details did the patient provide when requesting the medication: patient has been out for 2 days  Does the patient have less than a 3 day supply:  [x] Yes  [] No    What is the patient's [facility] [place] [address] [address] [facility] - [phone] [facility] [phone] FX

## 2021-03-06 RX ORDER — HYDROCHLOROTHIAZIDE 12.5 MG/1
12.5 TABLET ORAL DAILY
Qty: 30 TABLET | Refills: 0 | Status: SHIPPED | OUTPATIENT
Start: 2021-03-06 | End: 2021-04-06 | Stop reason: SDUPTHER

## 2021-03-15 ENCOUNTER — IMMUNIZATION (OUTPATIENT)
Dept: VACCINE CLINIC | Facility: HOSPITAL | Age: 61
End: 2021-03-15

## 2021-03-15 PROCEDURE — 91300 HC SARSCOV02 VAC 30MCG/0.3ML IM: CPT | Performed by: INTERNAL MEDICINE

## 2021-03-15 PROCEDURE — 0001A: CPT | Performed by: INTERNAL MEDICINE

## 2021-04-05 ENCOUNTER — IMMUNIZATION (OUTPATIENT)
Dept: VACCINE CLINIC | Facility: HOSPITAL | Age: 61
End: 2021-04-05

## 2021-04-05 PROCEDURE — 0002A: CPT | Performed by: INTERNAL MEDICINE

## 2021-04-05 PROCEDURE — 91300 HC SARSCOV02 VAC 30MCG/0.3ML IM: CPT | Performed by: INTERNAL MEDICINE

## 2021-04-06 DIAGNOSIS — I10 ESSENTIAL HYPERTENSION: ICD-10-CM

## 2021-04-06 RX ORDER — HYDROCHLOROTHIAZIDE 12.5 MG/1
12.5 TABLET ORAL DAILY
Qty: 90 TABLET | Refills: 3 | Status: SHIPPED | OUTPATIENT
Start: 2021-04-06 | End: 2022-03-28

## 2021-04-06 NOTE — TELEPHONE ENCOUNTER
Caller: Cristela Stratton    Relationship: Self    Best call back number: [phone]    Medication needed:   Requested Prescriptions     Pending Prescriptions Disp Refills   • hydroCHLOROthiazide (HYDRODIURIL) 12.5 MG tablet 30 tablet 0     Sig: Take 1 tablet by mouth Daily.       When do you need the refill by: 04/06/2021    What additional details did the patient provide when requesting the medication: PATIENT IS OUT OF MEDICATION. THE PHARMACY TOLD THE PATIENT THAT THEY TRIED TO CONTACT THE OFFICE BUT DID NOT GET A RESPONSE     Does the patient have less than a 3 day supply:  [x] Yes  [] No    What is the patient's preferred pharmacy[place] [place] [address] [address] [facility] [phone] [facility] [phone] FX

## 2021-04-23 RX ORDER — SOTALOL HYDROCHLORIDE 80 MG/1
TABLET ORAL
Qty: 180 TABLET | Refills: 0 | Status: SHIPPED | OUTPATIENT
Start: 2021-04-23 | End: 2021-07-23

## 2021-05-12 DIAGNOSIS — I10 ESSENTIAL HYPERTENSION: ICD-10-CM

## 2021-05-12 RX ORDER — AMLODIPINE BESYLATE 10 MG/1
TABLET ORAL
Qty: 90 TABLET | Refills: 0 | Status: SHIPPED | OUTPATIENT
Start: 2021-05-12 | End: 2021-08-05

## 2021-05-17 ENCOUNTER — OFFICE VISIT (OUTPATIENT)
Dept: CARDIOLOGY | Facility: CLINIC | Age: 61
End: 2021-05-17

## 2021-05-17 VITALS
OXYGEN SATURATION: 97 % | DIASTOLIC BLOOD PRESSURE: 80 MMHG | BODY MASS INDEX: 25.62 KG/M2 | HEART RATE: 75 BPM | HEIGHT: 70 IN | WEIGHT: 179 LBS | SYSTOLIC BLOOD PRESSURE: 130 MMHG

## 2021-05-17 DIAGNOSIS — I48.0 PAROXYSMAL ATRIAL FIBRILLATION (HCC): Primary | ICD-10-CM

## 2021-05-17 DIAGNOSIS — I34.0 NON-RHEUMATIC MITRAL REGURGITATION: ICD-10-CM

## 2021-05-17 DIAGNOSIS — I10 ESSENTIAL HYPERTENSION: ICD-10-CM

## 2021-05-17 PROCEDURE — 93000 ELECTROCARDIOGRAM COMPLETE: CPT | Performed by: INTERNAL MEDICINE

## 2021-05-17 PROCEDURE — 99214 OFFICE O/P EST MOD 30 MIN: CPT | Performed by: INTERNAL MEDICINE

## 2021-05-17 NOTE — PROGRESS NOTES
[facility] AT [address], [address]  [place], [place], [address] [phone]  [email or web address]           OUTPATIENT CLINIC FOLLOW UP NOTE    Patient Care Team:  Patient Care Team:  Jluis Licea DO as PCP - General (Internal Medicine)    Subjective:   Reason for consultation:   Chief Complaint   Patient presents with   • Paroxysmal atrial fibrillation (CMS/HCC)       HPI:    Cristela Stratton is a 61 y.o. female.  Cardiac problem list:  1. Paroxysmal atrial fibrillation  2. Mild mitral regurgitation  3. Hypertension  4. Mild PAD  5. Tobacco dependence  1. Quit in 2018 but restarted in late 2019    She presents for follow-up.    Since patient was last seen she reports that she has been doing well from a cardiac standpoint.      The patient continues to have mild intermittent palpitations that improves with rest and deep breathing.  She states that it is possibly a little bit worse than usual because her stress levels have been extremely high.  Her   4 weeks ago.  Had advanced liver cancer and battled it for the last 5 years.    Smokes about a pack or less a day    Review of Systems:  Positive for rare palpitations, restless leg syndrome  Negative for exertional chest pain, dyspnea with exertion, orthopnea, PND, lightheadedness, syncope.     PFSH:  Patient Active Problem List   Diagnosis   • Tobacco abuse   • Essential hypertension   • Atrial fibrillation (CMS/HCC)   • Non-rheumatic mitral regurgitation   • COPD (chronic obstructive pulmonary disease) (CMS/HCC)   • Subclinical hyperthyroidism   • Lower extremity edema         Current Outpatient Medications:   •  acetaminophen (TYLENOL) 500 MG tablet, Take 500 mg by mouth Every 6 (Six) Hours As Needed for mild pain (1-3)., Disp: , Rfl:   •  albuterol sulfate  (90 Base) MCG/ACT inhaler, Inhale 2 puffs Every 4 (Four) Hours As Needed for Wheezing., Disp: 18 g, Rfl: 11  •  amLODIPine (NORVASC) 10 MG  "tablet, Take 1 tablet by mouth once daily, Disp: 90 tablet, Rfl: 0  •  atorvastatin (LIPITOR) 10 MG tablet, TAKE ONE TABLET BY MOUTH ONCE NIGHTLY, Disp: 90 tablet, Rfl: 3  •  hydroCHLOROthiazide (HYDRODIURIL) 12.5 MG tablet, Take 1 tablet by mouth Daily., Disp: 90 tablet, Rfl: 3  •  ipratropium-albuterol (DUO-NEB) 0.5-2.5 mg/3 ml nebulizer, Take 3 mL by nebulization Every 4 (Four) Hours As Needed for Wheezing., Disp: 360 mL, Rfl: 1  •  rivaroxaban (Xarelto) 20 MG tablet, Take 1 tablet by mouth Daily., Disp: 90 tablet, Rfl: 1  •  sotalol (BETAPACE) 80 MG tablet, Take 1 tablet by mouth twice daily, Disp: 180 tablet, Rfl: 0  •  tiotropium bromide-olodaterol (Stiolto Respimat) 2.5-2.5 MCG/ACT aerosol solution inhaler, Inhale 2 puffs Daily., Disp: 4 g, Rfl: 11    No Known Allergies     reports that she has been smoking cigarettes. She has a 22.00 pack-year smoking history. She has never used smokeless tobacco.    Family History   Problem Relation Age of Onset   • Cancer Mother    • Cancer Maternal Aunt    • Alcohol abuse Maternal Grandfather    • Heart disease Maternal Grandfather        Objective:   Blood pressure 130/80, pulse 75, height 177.8 cm (70\"), weight 81.2 kg (179 lb), SpO2 97 %.  CONSTITUTIONAL: No acute distress  RESPIRATORY: Normal effort. Clear to auscultation bilaterally without wheezing or rales  CARDIOVASCULAR: Regular rate and rhythm with normal S1 and S2. Without murmur, gallop or rub.  No carotid bruit bilaterally  PERIPHERAL VASCULAR: Normal radial pulse. There is no lower extremity edema bilaterally.  Left lower extremity venous stasis dermatitis  PSYCH: Normal affect and mood    5/2021 exam: 2+ PT pulse bilaterally, 2+ left DP pulse, 0+ right DP pulse left lower extremity    Labs:  Lab Results   Component Value Date    ALT 9 08/12/2020    AST 11 08/12/2020     Lab Results   Component Value Date    TRIG 159 (H) 08/12/2020    HDL 35 (L) 08/12/2020    CREATININE 0.76 08/12/2020       Diagnostic Data: "      ECG 12 Lead    Date/Time: 5/17/2021 11:14 AM  Performed by: Adarsh Davidson MD  Authorized by: Adarsh Davidson MD   Comparison: compared with previous ECG from 10/26/2020  Similar to previous ECG  Rhythm: sinus rhythm  Comments: Heart rate 75, , QTc 486            14-day Zio Patch 9/2018  -No A. fib, longest SVT 11 beats.  -Patient triggered events were all normal sinus rhythm    TTE 5/2019  · Left ventricular systolic function is normal. Estimated EF = 60%.  · Left ventricular diastolic function is normal.  · There is no evidence of mitral valve prolapse. Only trace mitral regurgitation is present.    TTE 9/2016  · All left ventricular wall segments contract normally.  · Left ventricular function is normal.  · Mild tricuspid valve regurgitation is present.  · Mild pulmonic valve regurgitation is present.  · Mild mitral valve regurgitation is present    ABIs 8/25/2020: Right MACIEJ 0.86 at the anterior tibial and 0.98 at the posterior tibial.  Left MACIEJ 0.93 in AT, 1.02 in posterior tibial     Assessment and Plan:   Cristela was seen today for hypertension.    Diagnoses and all orders for this visit:    Paroxysmal atrial fibrillation  -Remains in normal sinus rhythm.  QRS/QTc stable.  -Continue sotalol and Xarelto.     Mild non-rheumatic mitral regurgitation  -Continue to monitor clinically     Restless leg syndrome   -Intermittent at night, not bad enough for the patient to wish for prescription medication  -Strongly advised smoking cessation and increased activity/walking    Essential hypertension  -Continue current related medications.     Tobacco dependence  -Encouraged cessation.  Discussed a slow titration downward in quantity of cigarettes smoked.  Patient states she will try       - Return in about 6 months (around 11/17/2021) for Next scheduled follow-up with an ECG with Fadumo DAILY.      Adarsh Davidson MD  05/17/21 11:14 EDT

## 2021-06-02 NOTE — TELEPHONE ENCOUNTER
Called  
[facility]ine elaine called and they need to clarify the directions for the amoxicillin. Please call [phone].  
generalized

## 2021-06-21 NOTE — ED NOTES
----- Message from Radha Powell RN sent at 6/14/2021  1:58 PM CDT -----  Regarding: INR  [facility] will check Don's INR today Monday 6/21/21.  This is a one week recheck.     [facility] called back to inform us that patient is still on waiting list for bed.      Ilene Espitia  09/07/18 1326

## 2021-07-02 RX ORDER — RIVAROXABAN 20 MG/1
TABLET, FILM COATED ORAL
Qty: 90 TABLET | Refills: 0 | Status: SHIPPED | OUTPATIENT
Start: 2021-07-02 | End: 2021-09-27

## 2021-07-23 RX ORDER — SOTALOL HYDROCHLORIDE 80 MG/1
TABLET ORAL
Qty: 180 TABLET | Refills: 0 | Status: SHIPPED | OUTPATIENT
Start: 2021-07-23 | End: 2021-10-25

## 2021-07-23 NOTE — TELEPHONE ENCOUNTER
Medication Refill Request    Medication: sotalol (BETAPACE) 80 MG tablet BID    Pertinent Labs:  Lab Results   Component Value Date    GLUCOSE 114 (H) 12/28/2019    BUN 8 08/12/2020    CREATININE 0.76 08/12/2020    EGFRIFNONA 77 08/12/2020    EGFRIFAFRI 93 08/12/2020    BCR 10.5 08/12/2020    K 3.9 08/12/2020    CO2 27.1 08/12/2020    CALCIUM 9.2 08/12/2020    ALBUMIN 4.40 08/12/2020    ALKPHOS 96 08/12/2020    AST 11 08/12/2020    ALT 9 08/12/2020      Lab Results   Component Value Date    CHLPL 211 (H) 08/12/2020    TRIG 159 (H) 08/12/2020    HDL 35 (L) 08/12/2020     (H) 08/12/2020     No results found for: HGBA1C  Lab Results   Component Value Date    WBC 5.29 08/12/2020    HGB 13.3 08/12/2020    HCT 38.7 08/12/2020    MCV 87.6 08/12/2020     08/12/2020     Lab Results   Component Value Date    TSH 0.219 (L) 09/09/2018

## 2021-08-04 DIAGNOSIS — I10 ESSENTIAL HYPERTENSION: ICD-10-CM

## 2021-08-05 RX ORDER — AMLODIPINE BESYLATE 10 MG/1
TABLET ORAL
Qty: 90 TABLET | Refills: 0 | Status: SHIPPED | OUTPATIENT
Start: 2021-08-05 | End: 2021-11-08

## 2021-09-27 RX ORDER — RIVAROXABAN 20 MG/1
TABLET, FILM COATED ORAL
Qty: 90 TABLET | Refills: 0 | Status: SHIPPED | OUTPATIENT
Start: 2021-09-27 | End: 2021-12-27

## 2021-10-14 ENCOUNTER — TELEPHONE (OUTPATIENT)
Dept: INTERNAL MEDICINE | Facility: CLINIC | Age: 61
End: 2021-10-14

## 2021-10-14 NOTE — TELEPHONE ENCOUNTER
Patient did not complete MAMMOGRAM ordered on 08/12/2020  This order is too old to be used. May I cancel the order?

## 2021-10-14 NOTE — TELEPHONE ENCOUNTER
Pt did not complete cologuard ordered on 08/12/2020. This order is too old to be used and has been cancelled.

## 2021-10-25 RX ORDER — SOTALOL HYDROCHLORIDE 80 MG/1
TABLET ORAL
Qty: 180 TABLET | Refills: 0 | Status: SHIPPED | OUTPATIENT
Start: 2021-10-25 | End: 2022-01-17

## 2021-10-28 DIAGNOSIS — J41.1 MUCOPURULENT CHRONIC BRONCHITIS (HCC): ICD-10-CM

## 2021-10-29 RX ORDER — TIOTROPIUM BROMIDE AND OLODATEROL 3.124; 2.736 UG/1; UG/1
SPRAY, METERED RESPIRATORY (INHALATION)
Qty: 4 G | Refills: 11 | Status: SHIPPED | OUTPATIENT
Start: 2021-10-29 | End: 2022-01-14 | Stop reason: SDUPTHER

## 2021-11-06 DIAGNOSIS — I10 ESSENTIAL HYPERTENSION: ICD-10-CM

## 2021-11-08 RX ORDER — AMLODIPINE BESYLATE 10 MG/1
TABLET ORAL
Qty: 90 TABLET | Refills: 0 | Status: SHIPPED | OUTPATIENT
Start: 2021-11-08 | End: 2022-01-17

## 2021-11-29 ENCOUNTER — OFFICE VISIT (OUTPATIENT)
Dept: CARDIOLOGY | Facility: CLINIC | Age: 61
End: 2021-11-29

## 2021-11-29 VITALS
WEIGHT: 178 LBS | OXYGEN SATURATION: 96 % | BODY MASS INDEX: 25.48 KG/M2 | SYSTOLIC BLOOD PRESSURE: 132 MMHG | HEIGHT: 70 IN | HEART RATE: 82 BPM | DIASTOLIC BLOOD PRESSURE: 92 MMHG

## 2021-11-29 DIAGNOSIS — I10 ESSENTIAL HYPERTENSION: Primary | ICD-10-CM

## 2021-11-29 DIAGNOSIS — I48.0 PAROXYSMAL ATRIAL FIBRILLATION (HCC): ICD-10-CM

## 2021-11-29 DIAGNOSIS — I34.0 NON-RHEUMATIC MITRAL REGURGITATION: ICD-10-CM

## 2021-11-29 PROCEDURE — 99214 OFFICE O/P EST MOD 30 MIN: CPT | Performed by: INTERNAL MEDICINE

## 2021-11-29 PROCEDURE — 93000 ELECTROCARDIOGRAM COMPLETE: CPT | Performed by: INTERNAL MEDICINE

## 2021-11-29 NOTE — PROGRESS NOTES
[facility] [address], [address]  [place], [place], [address] [phone]  [email or web address]           OUTPATIENT CLINIC FOLLOW UP NOTE    Patient Care Team:  Patient Care Team:  Jluis Licea DO as PCP - General (Internal Medicine)    Subjective:   Reason for consultation:   Chief Complaint   Patient presents with   • Paroxysmal atrial fibrillation (CMS/HCC)       HPI:    Cristela Stratton is a 61 y.o. female.  Cardiac problem list:  1. Paroxysmal atrial fibrillation  2. Mild mitral regurgitation  3. Hypertension  4. Mild PAD  5. Tobacco dependence  1. Quit in 2018 but restarted in late 2019    She presents for follow-up.    Since her last visit, patient has been doing well from a cardiac standpoint.  She continues to have mild occasional palpitations and lightheadedness.  Patient has had increased anxiety and stress related to passing of her  earlier this year and the birth of 2 new grandbabies.  She denies chest pain, dyspnea on exertion, and syncope.    Review of Systems:  Positive for palpitations, lightheadedness  Negative for exertional chest pain, dyspnea with exertion, orthopnea, PND, lightheadedness, syncope.     PFSH:  Patient Active Problem List   Diagnosis   • Tobacco abuse   • Essential hypertension   • Atrial fibrillation (HCC)   • Non-rheumatic mitral regurgitation   • COPD (chronic obstructive pulmonary disease) (HCC)   • Subclinical hyperthyroidism   • Lower extremity edema         Current Outpatient Medications:   •  acetaminophen (TYLENOL) 500 MG tablet, Take 500 mg by mouth Every 6 (Six) Hours As Needed for mild pain (1-3)., Disp: , Rfl:   •  albuterol sulfate  (90 Base) MCG/ACT inhaler, Inhale 2 puffs Every 4 (Four) Hours As Needed for Wheezing., Disp: 18 g, Rfl: 11  •  amLODIPine (NORVASC) 10 MG tablet, Take 1 tablet by mouth once daily, Disp: 90 tablet, Rfl: 0  •  atorvastatin (LIPITOR) 10 MG tablet, TAKE ONE TABLET BY  "MOUTH ONCE NIGHTLY, Disp: 90 tablet, Rfl: 3  •  hydroCHLOROthiazide (HYDRODIURIL) 12.5 MG tablet, Take 1 tablet by mouth Daily., Disp: 90 tablet, Rfl: 3  •  ipratropium-albuterol (DUO-NEB) 0.5-2.5 mg/3 ml nebulizer, Take 3 mL by nebulization Every 4 (Four) Hours As Needed for Wheezing., Disp: 360 mL, Rfl: 1  •  sotalol (BETAPACE) 80 MG tablet, Take 1 tablet by mouth twice daily, Disp: 180 tablet, Rfl: 0  •  Stiolto Respimat 2.5-2.5 MCG/ACT aerosol solution inhaler, INHALE 2 PUFFS BY MOUTH ONCE DAILY, Disp: 4 g, Rfl: 11  •  Xarelto 20 MG tablet, Take 1 tablet by mouth once daily, Disp: 90 tablet, Rfl: 0    No Known Allergies     reports that she has been smoking cigarettes. She has a 22.00 pack-year smoking history. She has never used smokeless tobacco.    Family History   Problem Relation Age of Onset   • Cancer Mother    • Cancer Maternal Aunt    • Alcohol abuse Maternal Grandfather    • Heart disease Maternal Grandfather    • No Known Problems Brother        Objective:   Blood pressure 132/92, pulse 82, height 177.8 cm (70\"), weight 80.7 kg (178 lb), SpO2 96 %.  CONSTITUTIONAL: No acute distress  RESPIRATORY: Normal effort. Clear to auscultation bilaterally without wheezing or rales  CARDIOVASCULAR: Regular rate and rhythm with normal S1 and S2. Without murmur, gallop or rub.  PERIPHERAL VASCULAR: Normal radial pulse. There is no lower extremity edema bilaterally.  PSYCH: Normal affect and mood      5/2021 exam: 2+ PT pulse bilaterally, 2+ left DP pulse, 0+ right DP pulse left lower extremity    Labs:  Lab Results   Component Value Date    ALT 9 08/12/2020    AST 11 08/12/2020     Lab Results   Component Value Date    TRIG 159 (H) 08/12/2020    HDL 35 (L) 08/12/2020    CREATININE 0.76 08/12/2020       Diagnostic Data:      ECG 12 Lead    Date/Time: 11/29/2021 10:01 AM  Performed by: Adarsh Davidson MD  Authorized by: Adarsh Davidson MD   Comparison: compared with previous ECG from 5/17/2021  Similar to previous " ECG  Rhythm: sinus rhythm  Ectopy: atrial premature contractions  Rate: normal  BPM: 82  Conduction: conduction normal              14-day Zio Patch 9/2018  -No A. fib, longest SVT 11 beats.  -Patient triggered events were all normal sinus rhythm    TTE 5/2019  · Left ventricular systolic function is normal. Estimated EF = 60%.  · Left ventricular diastolic function is normal.  · There is no evidence of mitral valve prolapse. Only trace mitral regurgitation is present.    TTE 9/2016  · All left ventricular wall segments contract normally.  · Left ventricular function is normal.  · Mild tricuspid valve regurgitation is present.  · Mild pulmonic valve regurgitation is present.  · Mild mitral valve regurgitation is present    ABIs 8/25/2020: Right MACIEJ 0.86 at the anterior tibial and 0.98 at the posterior tibial.  Left MACIEJ 0.93 in AT, 1.02 in posterior tibial     Assessment and Plan:     Paroxysmal atrial fibrillation  -Continues to remain in normal sinus rhythm.  QRS/QTC stable.  -Continue sotalol and Xarelto.     Mild non-rheumatic mitral regurgitation  -Continue to monitor clinically.     Restless leg syndrome   -Intermittent at night, stable  -Strongly advised smoking cessation and increased walking/activity    Essential hypertension  -Stable, continue current related medications     Tobacco dependence  -Encourage smoking cessation.  Patient under increased stress recently, not ready to quit at this time.       - Return in about 6 months (around 5/29/2022) for Next scheduled follow up with EKG.    Scribed for Adarsh Davidson MD by Sheryl Scott, ABIMBOLA. 11/29/2021  10:08 EST    I, Adarsh Davidson MD, personally performed the services as scribed by the above named individual. I have made any necessary edits and it is both accurate and complete.     Adarsh Davidson MD, MSc, FACC, [facility]  Interventional Cardiology  [facility]

## 2021-12-27 RX ORDER — RIVAROXABAN 20 MG/1
TABLET, FILM COATED ORAL
Qty: 90 TABLET | Refills: 0 | Status: SHIPPED | OUTPATIENT
Start: 2021-12-27 | End: 2022-03-28

## 2022-01-14 ENCOUNTER — OFFICE VISIT (OUTPATIENT)
Dept: PULMONOLOGY | Facility: CLINIC | Age: 62
End: 2022-01-14

## 2022-01-14 VITALS
OXYGEN SATURATION: 96 % | HEIGHT: 70 IN | TEMPERATURE: 97.8 F | BODY MASS INDEX: 25.31 KG/M2 | WEIGHT: 176.8 LBS | HEART RATE: 85 BPM | DIASTOLIC BLOOD PRESSURE: 80 MMHG | RESPIRATION RATE: 18 BRPM | SYSTOLIC BLOOD PRESSURE: 142 MMHG

## 2022-01-14 DIAGNOSIS — J41.1 MUCOPURULENT CHRONIC BRONCHITIS: Primary | ICD-10-CM

## 2022-01-14 DIAGNOSIS — Z72.0 TOBACCO ABUSE: ICD-10-CM

## 2022-01-14 DIAGNOSIS — Z12.2 ENCOUNTER FOR SCREENING FOR LUNG CANCER: ICD-10-CM

## 2022-01-14 DIAGNOSIS — I10 ESSENTIAL HYPERTENSION: ICD-10-CM

## 2022-01-14 PROCEDURE — 99214 OFFICE O/P EST MOD 30 MIN: CPT | Performed by: INTERNAL MEDICINE

## 2022-01-14 RX ORDER — ALBUTEROL SULFATE 90 UG/1
2 AEROSOL, METERED RESPIRATORY (INHALATION) EVERY 4 HOURS PRN
Qty: 18 G | Refills: 11 | Status: SHIPPED | OUTPATIENT
Start: 2022-01-14

## 2022-01-14 RX ORDER — TIOTROPIUM BROMIDE AND OLODATEROL 3.124; 2.736 UG/1; UG/1
2 SPRAY, METERED RESPIRATORY (INHALATION) DAILY
Qty: 4 G | Refills: 11 | Status: SHIPPED | OUTPATIENT
Start: 2022-01-14 | End: 2022-12-14

## 2022-01-14 NOTE — PROGRESS NOTES
"Follow Up Office Note       Patient Name: Cristela Stratton    Referring Physician: No ref. provider found    Chief Complaint:    Chief Complaint   Patient presents with   • COPD     6 month follow up        History of Present Illness: Cristela Stratton is a 61 y.o. female who is here today to follow-up care with Pulmonary.  Patient has a past medical history significant for A. fib, hypertension, and COPD.  She is currently doing well with her Stiolto.  She also has albuterol which she uses on an occasional basis.  No exacerbations since her last visit.  Denies any chest pain, nausea, fever, or chills.  She continues to have ongoing tobacco abuse.  Blood pressure is currently well controlled.  She has no other acute issues    Review of Systems:   Review of Systems   Constitutional: Negative for chills, fatigue and fever.   HENT: Negative for congestion and voice change.    Eyes: Negative for blurred vision.   Respiratory: Negative for cough, shortness of breath and wheezing.    Cardiovascular: Negative for chest pain.   Skin: Negative for dry skin.   Hematological: Negative for adenopathy.   Psychiatric/Behavioral: Negative for agitation and depressed mood.       The following portions of the patient's history were reviewed and updated as appropriate: allergies, current medications, past family history, past medical history, past social history, past surgical history and problem list.    Physical Exam:  Vital Signs:   Vitals:    01/14/22 1142   BP: 142/80   Pulse: 85   Resp: 18   Temp: 97.8 °F (36.6 °C)   TempSrc: Infrared   SpO2: 96%  Comment: room air   Weight: 80.2 kg (176 lb 12.8 oz)   Height: 177.8 cm (70\")   PainSc: 0-No pain       Physical Exam  Vitals and nursing note reviewed.   Constitutional:       General: She is not in acute distress.     Appearance: She is well-developed and normal weight. She is not ill-appearing or toxic-appearing.   HENT:      Head: Normocephalic and atraumatic.   Cardiovascular: "      Rate and Rhythm: Normal rate and regular rhythm.      Pulses: Normal pulses.      Heart sounds: Normal heart sounds. No murmur heard.  No friction rub. No gallop.    Pulmonary:      Effort: Pulmonary effort is normal. No respiratory distress.      Breath sounds: Normal breath sounds. No wheezing, rhonchi or rales.   Musculoskeletal:      Right lower leg: No edema.      Left lower leg: No edema.   Skin:     General: Skin is warm and dry.   Neurological:      Mental Status: She is alert and oriented to person, place, and time.         Immunization History   Administered Date(s) Administered   • COVID-19 (PFIZER) 03/15/2021, 04/05/2021   • Rabies 08/21/2003, 08/25/2003, 09/02/2003       Results Review:   - imaging from  CT of the chest January 2020 which shows bilateral patchy groundglass opacities mostly in the right upper right middle and lingula, no masses or nodules are seen.  - PFT from 1/13/2020 which showed moderate obstruction without restriction and a mildly reduced DLCO at 60%, FEV1 was noted to be 63%.  - Echo report from 4/29/2019 showed a left ventricular systolic function was normal and EF of 60%, with normal diastolic function and no evidence of mitral valve prolapse.    Assessment / Plan:   Diagnoses and all orders for this visit:    1. Mucopurulent chronic bronchitis (HCC) (Primary)  -Patient has gold stage 3 class A COPD  -Continue albuterol and Stiolto for medication management.  Prescriptions refilled today  -latest PFTs as noted above  -Patient counseled on monitoring for COPD exacerbation and treatment plan    2. Essential hypertension  -Blood pressure is well controlled.  Continue current regimen    3. Tobacco abuse  4. Encounter for screening for lung cancer  -Patient meets requirements for lung cancer screening.  Therefore I have gone ahead and ordered her to have a lung cancer screening CT at her convenience I will call her with the results.      Follow Up:   Return in about 1 year  (around 1/14/2023).       WINNIE Hernandez, DO  Pulmonary and Critical Care Medicine  Note Electronically Signed    Part of this note may be an electronic transcription/translation of spoken language to printed text using the Dragon Dictation System.

## 2022-01-15 DIAGNOSIS — I10 ESSENTIAL HYPERTENSION: ICD-10-CM

## 2022-01-17 RX ORDER — AMLODIPINE BESYLATE 10 MG/1
TABLET ORAL
Qty: 90 TABLET | Refills: 0 | Status: SHIPPED | OUTPATIENT
Start: 2022-01-17 | End: 2022-05-09

## 2022-01-17 RX ORDER — SOTALOL HYDROCHLORIDE 80 MG/1
TABLET ORAL
Qty: 180 TABLET | Refills: 0 | Status: SHIPPED | OUTPATIENT
Start: 2022-01-17 | End: 2022-04-19

## 2022-02-12 DIAGNOSIS — E78.2 MIXED HYPERLIPIDEMIA: ICD-10-CM

## 2022-02-14 RX ORDER — ATORVASTATIN CALCIUM 10 MG/1
10 TABLET, FILM COATED ORAL NIGHTLY
Qty: 30 TABLET | Refills: 1 | Status: SHIPPED | OUTPATIENT
Start: 2022-02-14 | End: 2022-04-18 | Stop reason: SDUPTHER

## 2022-03-28 DIAGNOSIS — I10 ESSENTIAL HYPERTENSION: ICD-10-CM

## 2022-03-28 RX ORDER — HYDROCHLOROTHIAZIDE 12.5 MG/1
12.5 TABLET ORAL DAILY
Qty: 30 TABLET | Refills: 0 | Status: SHIPPED | OUTPATIENT
Start: 2022-03-28 | End: 2022-04-18 | Stop reason: SDUPTHER

## 2022-03-28 RX ORDER — RIVAROXABAN 20 MG/1
TABLET, FILM COATED ORAL
Qty: 90 TABLET | Refills: 0 | Status: SHIPPED | OUTPATIENT
Start: 2022-03-28 | End: 2022-06-28

## 2022-04-14 DIAGNOSIS — E78.2 MIXED HYPERLIPIDEMIA: ICD-10-CM

## 2022-04-14 RX ORDER — ATORVASTATIN CALCIUM 10 MG/1
TABLET, FILM COATED ORAL
Qty: 30 TABLET | Refills: 1 | OUTPATIENT
Start: 2022-04-14

## 2022-04-14 NOTE — TELEPHONE ENCOUNTER
Rx Refill Note  Requested Prescriptions     Pending Prescriptions Disp Refills   • atorvastatin (LIPITOR) 10 MG tablet [Pharmacy Med Name: ATORVASTATIN 10 MG TABLET] 30 tablet 1     Sig: TAKE ONE TABLET BY MOUTH ONCE NIGHTLY *NEEDS APPOINTMENT FOR REFILLS      Last office visit with prescribing clinician: 2/3/2021      Next office visit with prescribing clinician: Visit date not found            Christy Garcia LPN  04/14/22, 12:51 EDT

## 2022-04-18 DIAGNOSIS — I10 ESSENTIAL HYPERTENSION: ICD-10-CM

## 2022-04-18 DIAGNOSIS — E78.2 MIXED HYPERLIPIDEMIA: ICD-10-CM

## 2022-04-18 RX ORDER — ATORVASTATIN CALCIUM 10 MG/1
10 TABLET, FILM COATED ORAL NIGHTLY
Qty: 30 TABLET | Refills: 0 | Status: SHIPPED | OUTPATIENT
Start: 2022-04-18 | End: 2022-04-19 | Stop reason: SDUPTHER

## 2022-04-18 RX ORDER — HYDROCHLOROTHIAZIDE 12.5 MG/1
12.5 TABLET ORAL DAILY
Qty: 30 TABLET | Refills: 0 | Status: SHIPPED | OUTPATIENT
Start: 2022-04-18 | End: 2022-04-19 | Stop reason: SDUPTHER

## 2022-04-18 NOTE — TELEPHONE ENCOUNTER
Caller: Nu Strattonarepricilla BATRES    Relationship: Self    Best call back number: [phone]    Requested Prescriptions:   Requested Prescriptions     Pending Prescriptions Disp Refills   • atorvastatin (LIPITOR) 10 MG tablet 30 tablet 1     Sig: Take 1 tablet by mouth Every Night. Appointment needed for refills        Pharmacy where request should be sent:    [facility] [address] [place] [address] [phone] [facility] [phone] FX        Additional details provided by patient:   PT STATED THAT NEEDS BLOOD PRESSURE MEDICATION ALSO.  PT IS SCHEDULED FOR APPT ON 5/4  Does the patient have less than a 3 day supply:  [x] Yes  [] No    John Lloyd Rep   04/18/22 14:13 EDT

## 2022-04-18 NOTE — TELEPHONE ENCOUNTER
Rx Refill Note  Requested Prescriptions     Pending Prescriptions Disp Refills   • atorvastatin (LIPITOR) 10 MG tablet 30 tablet 1     Sig: Take 1 tablet by mouth Every Night. Appointment needed for refills      Last office visit with prescribing clinician: 2/3/2021      Next office visit with prescribing clinician: 5/4/2022            Rosa Henry LPN  04/18/22, 16:38 EDT

## 2022-04-19 DIAGNOSIS — I10 ESSENTIAL HYPERTENSION: ICD-10-CM

## 2022-04-19 DIAGNOSIS — E78.2 MIXED HYPERLIPIDEMIA: ICD-10-CM

## 2022-04-19 RX ORDER — HYDROCHLOROTHIAZIDE 12.5 MG/1
TABLET ORAL
Qty: 30 TABLET | Refills: 0 | OUTPATIENT
Start: 2022-04-19

## 2022-04-19 RX ORDER — HYDROCHLOROTHIAZIDE 12.5 MG/1
12.5 TABLET ORAL DAILY
Qty: 30 TABLET | Refills: 0 | Status: SHIPPED | OUTPATIENT
Start: 2022-04-19 | End: 2022-05-27 | Stop reason: SDUPTHER

## 2022-04-19 RX ORDER — SOTALOL HYDROCHLORIDE 80 MG/1
TABLET ORAL
Qty: 180 TABLET | Refills: 0 | Status: SHIPPED | OUTPATIENT
Start: 2022-04-19 | End: 2022-07-12

## 2022-04-19 RX ORDER — ATORVASTATIN CALCIUM 10 MG/1
TABLET, FILM COATED ORAL
Qty: 30 TABLET | Refills: 0 | OUTPATIENT
Start: 2022-04-19

## 2022-04-19 RX ORDER — ATORVASTATIN CALCIUM 10 MG/1
10 TABLET, FILM COATED ORAL NIGHTLY
Qty: 30 TABLET | Refills: 0 | Status: SHIPPED | OUTPATIENT
Start: 2022-04-19 | End: 2022-05-19

## 2022-04-19 NOTE — TELEPHONE ENCOUNTER
PATIENT STATES THAT SHE MADE AN APPOINTMENT ON 5/3/22 AND WANTED TO SEE IF SHE COULD GET HER MEDICINE REFILLED UNTIL THEN SINCE SHE IS OUT.

## 2022-05-08 DIAGNOSIS — I10 ESSENTIAL HYPERTENSION: ICD-10-CM

## 2022-05-09 RX ORDER — AMLODIPINE BESYLATE 10 MG/1
TABLET ORAL
Qty: 90 TABLET | Refills: 0 | Status: SHIPPED | OUTPATIENT
Start: 2022-05-09 | End: 2022-08-05

## 2022-05-17 DIAGNOSIS — E78.2 MIXED HYPERLIPIDEMIA: ICD-10-CM

## 2022-05-18 RX ORDER — ATORVASTATIN CALCIUM 10 MG/1
TABLET, FILM COATED ORAL
Qty: 30 TABLET | Refills: 0 | OUTPATIENT
Start: 2022-05-18

## 2022-05-19 DIAGNOSIS — E78.2 MIXED HYPERLIPIDEMIA: ICD-10-CM

## 2022-05-19 RX ORDER — ATORVASTATIN CALCIUM 10 MG/1
TABLET, FILM COATED ORAL
Qty: 30 TABLET | Refills: 0 | Status: SHIPPED | OUTPATIENT
Start: 2022-05-19 | End: 2022-05-27 | Stop reason: SDUPTHER

## 2022-05-19 NOTE — TELEPHONE ENCOUNTER
Caller: Nu Strattonarepricilla BATRES    Relationship: Self    Best call back number: [phone]  Requested Prescriptions:   Requested Prescriptions     Pending Prescriptions Disp Refills   • atorvastatin (LIPITOR) 10 MG tablet [Pharmacy Med Name: Atorvastatin Calcium 10 MG Oral Tablet] 30 tablet 0     Sig: TAKE 1 TABLET BY MOUTH ONCE DAILY AT NIGHT . APPOINTMENT REQUIRED FOR FUTURE REFILLS        Pharmacy where request should be sent: [facility] [address] [place] [address] [phone] [facility] [phone]      Additional details provided by patient: PATIENT IS COMPLETELY OUT AND HAS NO REFILLS     Does the patient have less than a 3 day supply:  [x] Yes  [] No    John Malloy Rep   05/19/22 09:04 EDT

## 2022-05-27 ENCOUNTER — DOCUMENTATION (OUTPATIENT)
Dept: PULMONOLOGY | Facility: CLINIC | Age: 62
End: 2022-05-27

## 2022-05-27 DIAGNOSIS — E78.2 MIXED HYPERLIPIDEMIA: ICD-10-CM

## 2022-05-27 DIAGNOSIS — I10 ESSENTIAL HYPERTENSION: ICD-10-CM

## 2022-05-27 RX ORDER — ATORVASTATIN CALCIUM 10 MG/1
10 TABLET, FILM COATED ORAL NIGHTLY
Qty: 30 TABLET | Refills: 0 | Status: SHIPPED | OUTPATIENT
Start: 2022-05-27 | End: 2022-06-22 | Stop reason: SDUPTHER

## 2022-05-27 RX ORDER — HYDROCHLOROTHIAZIDE 12.5 MG/1
12.5 TABLET ORAL DAILY
Qty: 30 TABLET | Refills: 0 | Status: SHIPPED | OUTPATIENT
Start: 2022-05-27 | End: 2022-06-22 | Stop reason: SDUPTHER

## 2022-05-27 NOTE — TELEPHONE ENCOUNTER
Rx Refill Note  Requested Prescriptions     Pending Prescriptions Disp Refills   • hydroCHLOROthiazide (HYDRODIURIL) 12.5 MG tablet 30 tablet 0     Sig: Take 1 tablet by mouth Daily. appt needed for additional refills   • atorvastatin (LIPITOR) 10 MG tablet 30 tablet 0      Last office visit with prescribing clinician: 2/3/2021      Next office visit with prescribing clinician: 6/22/2022            AYAZ TORREZ MA  05/27/22, 09:49 EDT

## 2022-05-27 NOTE — TELEPHONE ENCOUNTER
Caller: Cristela Stratton    Relationship: Self    Best call back number: [phone]    Requested Prescriptions:   Requested Prescriptions     Pending Prescriptions Disp Refills   • hydroCHLOROthiazide (HYDRODIURIL) 12.5 MG tablet 30 tablet 0     Sig: Take 1 tablet by mouth Daily. appt needed for additional refills   • atorvastatin (LIPITOR) 10 MG tablet 30 tablet 0        Pharmacy where request should be sent: [address] [phone] [facility] [phone] FX     Additional details provided by patient:     Does the patient have less than a 3 day supply:  [] Yes  [x] No    John Chang Rep   05/27/22 09:39 EDT

## 2022-06-22 ENCOUNTER — OFFICE VISIT (OUTPATIENT)
Dept: INTERNAL MEDICINE | Facility: CLINIC | Age: 62
End: 2022-06-22

## 2022-06-22 VITALS
SYSTOLIC BLOOD PRESSURE: 138 MMHG | TEMPERATURE: 97.5 F | BODY MASS INDEX: 24.48 KG/M2 | RESPIRATION RATE: 17 BRPM | DIASTOLIC BLOOD PRESSURE: 82 MMHG | HEIGHT: 70 IN | HEART RATE: 79 BPM | WEIGHT: 171 LBS | OXYGEN SATURATION: 99 %

## 2022-06-22 DIAGNOSIS — E78.2 MIXED HYPERLIPIDEMIA: ICD-10-CM

## 2022-06-22 DIAGNOSIS — Z12.31 SCREENING MAMMOGRAM FOR BREAST CANCER: ICD-10-CM

## 2022-06-22 DIAGNOSIS — L98.9 FACIAL SKIN LESION: ICD-10-CM

## 2022-06-22 DIAGNOSIS — I10 ESSENTIAL HYPERTENSION: ICD-10-CM

## 2022-06-22 DIAGNOSIS — Z00.00 ENCOUNTER FOR PREVENTIVE HEALTH EXAMINATION: Primary | ICD-10-CM

## 2022-06-22 DIAGNOSIS — Z12.11 SCREEN FOR COLON CANCER: ICD-10-CM

## 2022-06-22 DIAGNOSIS — Z91.89 EXCESSIVE CONSUMPTION OF SODA POP: ICD-10-CM

## 2022-06-22 DIAGNOSIS — K21.9 GASTROESOPHAGEAL REFLUX DISEASE WITHOUT ESOPHAGITIS: ICD-10-CM

## 2022-06-22 PROCEDURE — 99396 PREV VISIT EST AGE 40-64: CPT | Performed by: INTERNAL MEDICINE

## 2022-06-22 RX ORDER — FAMOTIDINE 20 MG/1
20 TABLET, FILM COATED ORAL 2 TIMES DAILY
Qty: 60 TABLET | Refills: 2 | Status: SHIPPED | OUTPATIENT
Start: 2022-06-22 | End: 2022-09-19

## 2022-06-22 RX ORDER — HYDROCHLOROTHIAZIDE 12.5 MG/1
12.5 TABLET ORAL DAILY
Qty: 90 TABLET | Refills: 3 | Status: SHIPPED | OUTPATIENT
Start: 2022-06-22

## 2022-06-22 RX ORDER — ATORVASTATIN CALCIUM 10 MG/1
10 TABLET, FILM COATED ORAL NIGHTLY
Qty: 90 TABLET | Refills: 3 | Status: SHIPPED | OUTPATIENT
Start: 2022-06-22

## 2022-06-22 NOTE — PROGRESS NOTES
Chief Complaint   Patient presents with   • Annual Exam     Med refills   • Heartburn     Has increased lately-taking rolaids       Subjective     History of Present Illness   Cristela Stratton is a 62 y.o. female presenting for annual physical.  Preventive health maintenance was reviewed and discussed today. Vaccines were updated.   Patient has gained some weight since her last visit.  She admits to drinking about 4 servings of soda per day and not drinking much water.  She does feel thirsty all the time.  Remains compliant with statin and BP medications.  Has noticed increased GERD symptoms over the last couple of months.      Has facial lesions across forehead which have not improved and seem to be progressively worsening over the last year.     The following portions of the patient's history were reviewed and updated as appropriate: allergies, current medications, past family history, past medical history, past social history, past surgical history and problem list.    Review of Systems   Constitutional: Negative for chills, fatigue and fever.   HENT: Negative for congestion, ear pain, rhinorrhea, sinus pressure and sore throat.    Eyes: Negative for visual disturbance.   Respiratory: Negative for cough, chest tightness, shortness of breath and wheezing.    Cardiovascular: Negative for chest pain, palpitations and leg swelling.   Gastrointestinal: Negative for abdominal pain, blood in stool, constipation, diarrhea, nausea and vomiting.   Endocrine: Negative for polydipsia and polyuria.   Genitourinary: Negative for dysuria and hematuria.   Musculoskeletal: Negative for arthralgias and back pain.   Skin: Positive for rash.   Neurological: Negative for dizziness, light-headedness, numbness and headaches.   Psychiatric/Behavioral: Negative for dysphoric mood and sleep disturbance. The patient is not nervous/anxious.        No Known Allergies    Past Medical History:   Diagnosis Date   • Atrial fibrillation (HCC)      CHADS-VASC = 2   • COPD (chronic obstructive pulmonary disease) (HCC)    • Hypertension        Social History     Socioeconomic History   • Marital status:    Tobacco Use   • Smoking status: Current Every Day Smoker     Packs/day: 1.00     Years: 22.00     Pack years: 22.00     Types: Cigarettes   • Smokeless tobacco: Never Used   Vaping Use   • Vaping Use: Never used   Substance and Sexual Activity   • Alcohol use: No   • Drug use: No   • Sexual activity: Never        History reviewed. No pertinent surgical history.    Family History   Problem Relation Age of Onset   • Cancer Mother    • Cancer Maternal Aunt    • Alcohol abuse Maternal Grandfather    • Heart disease Maternal Grandfather    • No Known Problems Brother          Current Outpatient Medications:   •  acetaminophen (TYLENOL) 500 MG tablet, Take 500 mg by mouth Every 6 (Six) Hours As Needed for mild pain (1-3)., Disp: , Rfl:   •  albuterol sulfate  (90 Base) MCG/ACT inhaler, Inhale 2 puffs Every 4 (Four) Hours As Needed for Wheezing., Disp: 18 g, Rfl: 11  •  amLODIPine (NORVASC) 10 MG tablet, Take 1 tablet by mouth once daily, Disp: 90 tablet, Rfl: 0  •  atorvastatin (LIPITOR) 10 MG tablet, Take 1 tablet by mouth Every Night., Disp: 90 tablet, Rfl: 3  •  hydroCHLOROthiazide (HYDRODIURIL) 12.5 MG tablet, Take 1 tablet by mouth Daily., Disp: 90 tablet, Rfl: 3  •  ipratropium-albuterol (DUO-NEB) 0.5-2.5 mg/3 ml nebulizer, Take 3 mL by nebulization Every 4 (Four) Hours As Needed for Wheezing., Disp: 360 mL, Rfl: 1  •  sotalol (BETAPACE) 80 MG tablet, Take 1 tablet by mouth twice daily, Disp: 180 tablet, Rfl: 0  •  tiotropium bromide-olodaterol (Stiolto Respimat) 2.5-2.5 MCG/ACT aerosol solution inhaler, Inhale 2 puffs Daily., Disp: 4 g, Rfl: 11  •  Xarelto 20 MG tablet, Take 1 tablet by mouth once daily, Disp: 90 tablet, Rfl: 0  •  famotidine (Pepcid) 20 MG tablet, Take 1 tablet by mouth 2 (Two) Times a Day., Disp: 60 tablet, Rfl:  "2    Objective   /82   Pulse 79   Temp 97.5 °F (36.4 °C)   Resp 17   Ht 177.8 cm (70\")   Wt 77.6 kg (171 lb)   LMP  (LMP Unknown)   SpO2 99%   BMI 24.54 kg/m²     Physical Exam  Vitals and nursing note reviewed.   Constitutional:       General: She is not in acute distress.     Appearance: Normal appearance. She is well-developed.   HENT:      Head: Normocephalic and atraumatic.        Right Ear: Tympanic membrane and external ear normal.      Left Ear: Tympanic membrane and external ear normal.      Nose: Nose normal.      Mouth/Throat:      Mouth: Mucous membranes are moist.      Pharynx: No oropharyngeal exudate.   Eyes:      General: No scleral icterus.     Extraocular Movements: Extraocular movements intact.      Conjunctiva/sclera: Conjunctivae normal.      Pupils: Pupils are equal, round, and reactive to light.   Neck:      Thyroid: No thyromegaly.      Vascular: No JVD.   Cardiovascular:      Rate and Rhythm: Normal rate and regular rhythm.      Heart sounds: Normal heart sounds.   Pulmonary:      Effort: Pulmonary effort is normal. No respiratory distress.      Breath sounds: Normal breath sounds. No stridor. No wheezing.   Abdominal:      General: Bowel sounds are normal. There is no distension.      Palpations: Abdomen is soft. There is no mass.      Tenderness: There is no abdominal tenderness. There is no guarding.   Genitourinary:     Comments: Deferred  Musculoskeletal:         General: No tenderness. Normal range of motion.      Cervical back: Normal range of motion and neck supple.   Lymphadenopathy:      Cervical: No cervical adenopathy.   Skin:     General: Skin is warm and dry.      Findings: No rash.      Comments: Cystic papular lesions skin colored on forehead   Neurological:      General: No focal deficit present.      Mental Status: She is alert and oriented to person, place, and time.      Cranial Nerves: No cranial nerve deficit.   Psychiatric:         Mood and Affect: Mood " normal.         Behavior: Behavior normal.         Thought Content: Thought content normal.         Judgment: Judgment normal.         Assessment & Plan   Diagnoses and all orders for this visit:    1. Encounter for preventive health examination (Primary)  -     CBC & Differential  -     Comprehensive Metabolic Panel  -     Lipid Panel  -     Hemoglobin A1c  -     Ambulatory Referral to Gynecology    2. Essential hypertension  -     hydroCHLOROthiazide (HYDRODIURIL) 12.5 MG tablet; Take 1 tablet by mouth Daily.  Dispense: 90 tablet; Refill: 3    3. Mixed hyperlipidemia  -     atorvastatin (LIPITOR) 10 MG tablet; Take 1 tablet by mouth Every Night.  Dispense: 90 tablet; Refill: 3    4. Gastroesophageal reflux disease without esophagitis  -     famotidine (Pepcid) 20 MG tablet; Take 1 tablet by mouth 2 (Two) Times a Day.  Dispense: 60 tablet; Refill: 2    5. Excessive consumption of soda pop  -     Comprehensive Metabolic Panel  -     Lipid Panel  -     Hemoglobin A1c    6. Facial skin lesion  -     Ambulatory Referral to Dermatology    7. Screening mammogram for breast cancer  -     Mammo Screening Digital Tomosynthesis Bilateral With CAD; Future    8. Screen for colon cancer  -     Cologuard - Stool, Per Rectum; Future          Discussion Summary:  Patient is a 62 y.o. female presenting for annual physical    1. Preventive Health Maintenance  - Baseline labs are up-to-date or ordered per above.  - Vaccines reviewed and updated  - Preventive health measures were discussed including: healthy diet with increase in fruits and vegetables, regular exercise at least 3 times a week, safe sex practices, avoidance of drugs, tobacco, and alcohol, and regular seatbelt use.  - mammogram due, order placed.     2. Excessive soda consumption  - concerning for diabetes with increased thirst.  Check labs.     3. GERD  - start pepcid BID for symptom control, reduce soda intake.     4.  HTN  - stable.     5. HLD  - cont statin. Follow  up annual lipids.       Follow up:  Return in about 3 months (around 9/22/2022) for Next scheduled follow up.     Patient Instructions:  Patient instructions were provided.

## 2022-06-28 RX ORDER — RIVAROXABAN 20 MG/1
TABLET, FILM COATED ORAL
Qty: 90 TABLET | Refills: 0 | Status: SHIPPED | OUTPATIENT
Start: 2022-06-28 | End: 2022-09-19

## 2022-07-12 RX ORDER — SOTALOL HYDROCHLORIDE 80 MG/1
TABLET ORAL
Qty: 180 TABLET | Refills: 0 | Status: SHIPPED | OUTPATIENT
Start: 2022-07-12 | End: 2022-10-17

## 2022-08-04 DIAGNOSIS — I10 ESSENTIAL HYPERTENSION: ICD-10-CM

## 2022-08-05 RX ORDER — AMLODIPINE BESYLATE 10 MG/1
TABLET ORAL
Qty: 90 TABLET | Refills: 0 | Status: SHIPPED | OUTPATIENT
Start: 2022-08-05 | End: 2022-10-24

## 2022-09-08 ENCOUNTER — OFFICE VISIT (OUTPATIENT)
Dept: CARDIOLOGY | Facility: CLINIC | Age: 62
End: 2022-09-08

## 2022-09-08 VITALS
DIASTOLIC BLOOD PRESSURE: 62 MMHG | BODY MASS INDEX: 23.62 KG/M2 | OXYGEN SATURATION: 99 % | WEIGHT: 165 LBS | HEIGHT: 70 IN | HEART RATE: 72 BPM | SYSTOLIC BLOOD PRESSURE: 124 MMHG

## 2022-09-08 DIAGNOSIS — I48.0 PAROXYSMAL ATRIAL FIBRILLATION: Primary | ICD-10-CM

## 2022-09-08 DIAGNOSIS — I34.0 NON-RHEUMATIC MITRAL REGURGITATION: ICD-10-CM

## 2022-09-08 DIAGNOSIS — I10 ESSENTIAL HYPERTENSION: ICD-10-CM

## 2022-09-08 PROCEDURE — 99214 OFFICE O/P EST MOD 30 MIN: CPT | Performed by: INTERNAL MEDICINE

## 2022-09-08 PROCEDURE — 93000 ELECTROCARDIOGRAM COMPLETE: CPT | Performed by: INTERNAL MEDICINE

## 2022-09-08 NOTE — PROGRESS NOTES
" [facility] AT [address], [address]  [place], [place], [address] [phone]  [email or web address].[email or web address]           OUTPATIENT CLINIC FOLLOW UP NOTE    Patient Care Team:  Patient Care Team:  Jluis Licea DO as PCP - General (Internal Medicine)    Subjective:   Reason for consultation:   Chief Complaint   Patient presents with   • Atrial Fibrillation   • Hypertension       HPI:    Cristela Stratton is a 62 y.o. female.  Cardiac problem list:  1. Paroxysmal atrial fibrillation  2. Mild mitral regurgitation  3. Hypertension  4. Mild PAD  5. Tobacco dependence  1. Quit in 2018 but restarted in late 2019    She presents for follow-up.    Patient is been doing well from a cardiac standpoint since her last visit.  Denies chest pain, palpitations, shortness of, lower extremity edema, lightheadedness or syncope.  Does note she is under increased stress currently and has \"a lot on her plate.\"  Continues to smoke 1 pack/day.  Blood pressure well controlled at home.  No bleeding diatheses on Xarelto.    Review of Systems:  As noted in HPI.     PFSH:  Patient Active Problem List   Diagnosis   • Tobacco abuse   • Essential hypertension   • Atrial fibrillation (HCC)   • Non-rheumatic mitral regurgitation   • Mucopurulent chronic bronchitis (HCC)   • Subclinical hyperthyroidism   • Lower extremity edema         Current Outpatient Medications:   •  acetaminophen (TYLENOL) 500 MG tablet, Take 500 mg by mouth Every 6 (Six) Hours As Needed for mild pain (1-3)., Disp: , Rfl:   •  albuterol sulfate  (90 Base) MCG/ACT inhaler, Inhale 2 puffs Every 4 (Four) Hours As Needed for Wheezing., Disp: 18 g, Rfl: 11  •  amLODIPine (NORVASC) 10 MG tablet, Take 1 tablet by mouth once daily, Disp: 90 tablet, Rfl: 0  •  atorvastatin (LIPITOR) 10 MG tablet, Take 1 tablet by mouth Every Night., Disp: 90 tablet, Rfl: 3  •  famotidine (Pepcid) 20 MG tablet, Take 1 tablet by mouth 2 (Two) Times a Day., " "Disp: 60 tablet, Rfl: 2  •  hydroCHLOROthiazide (HYDRODIURIL) 12.5 MG tablet, Take 1 tablet by mouth Daily., Disp: 90 tablet, Rfl: 3  •  ipratropium-albuterol (DUO-NEB) 0.5-2.5 mg/3 ml nebulizer, Take 3 mL by nebulization Every 4 (Four) Hours As Needed for Wheezing., Disp: 360 mL, Rfl: 1  •  sotalol (BETAPACE) 80 MG tablet, Take 1 tablet by mouth twice daily, Disp: 180 tablet, Rfl: 0  •  tiotropium bromide-olodaterol (Stiolto Respimat) 2.5-2.5 MCG/ACT aerosol solution inhaler, Inhale 2 puffs Daily., Disp: 4 g, Rfl: 11  •  Xarelto 20 MG tablet, Take 1 tablet by mouth once daily, Disp: 90 tablet, Rfl: 0    No Known Allergies     reports that she has been smoking cigarettes. She has a 22.00 pack-year smoking history. She has never used smokeless tobacco.    Family History   Problem Relation Age of Onset   • Cancer Mother    • Cancer Maternal Aunt    • Alcohol abuse Maternal Grandfather    • Heart disease Maternal Grandfather    • No Known Problems Brother        Objective:   Blood pressure 124/62, pulse 72, height 177.8 cm (70\"), weight 74.8 kg (165 lb), SpO2 99 %.  CONSTITUTIONAL: No acute distress  RESPIRATORY: Normal effort. Clear to auscultation bilaterally without wheezing or rales  CARDIOVASCULAR: Regular rate and rhythm with normal S1 and S2. Without murmur, gallop or rub.  PERIPHERAL VASCULAR: Normal radial pulse. There is no lower extremity edema bilaterally.  PSYCH: Normal affect and mood      5/2021 exam: 2+ PT pulse bilaterally, 2+ left DP pulse, 0+ right DP pulse left lower extremity    Labs:  Lab Results   Component Value Date    ALT 9 08/12/2020    AST 11 08/12/2020     Lab Results   Component Value Date    TRIG 159 (H) 08/12/2020    HDL 35 (L) 08/12/2020    CREATININE 0.76 08/12/2020       Diagnostic Data:      ECG 12 Lead    Date/Time: 9/8/2022 3:34 PM  Performed by: Adarsh Davidson MD  Authorized by: Adarsh Davidson MD   Comparison: compared with previous ECG from 11/29/2021  Comparison to previous ECG: " PAC's no longer present  Rhythm: sinus rhythm  Rate: normal  BPM: 70  Conduction: conduction normal              14-day Zio Patch 9/2018  -No A. fib, longest SVT 11 beats.  -Patient triggered events were all normal sinus rhythm    TTE 5/2019  · Left ventricular systolic function is normal. Estimated EF = 60%.  · Left ventricular diastolic function is normal.  · There is no evidence of mitral valve prolapse. Only trace mitral regurgitation is present.    TTE 9/2016  · All left ventricular wall segments contract normally.  · Left ventricular function is normal.  · Mild tricuspid valve regurgitation is present.  · Mild pulmonic valve regurgitation is present.  · Mild mitral valve regurgitation is present    ABIs 8/25/2020: Right MACIEJ 0.86 at the anterior tibial and 0.98 at the posterior tibial.  Left MACIEJ 0.93 in AT, 1.02 in posterior tibial     Assessment and Plan:     Paroxysmal atrial fibrillation  -Stable EKG today. Maintaining normal sinus rhythm.  QRS/QTC stable.  -Continue sotalol and Xarelto.     Mild non-rheumatic mitral regurgitation  -Continue to monitor clinically.     Restless leg syndrome   -Intermittent at night, stable  -Strongly advised smoking cessation and increased walking/activity    Essential hypertension  -Blood pressure at goal.  -Continue current related medications     Tobacco dependence  -Encourage smoking cessation.  Patient not interested in quitting at this time.       - Return in about 1 year (around 9/8/2023) for Next scheduled follow up with EKG .    Scribed for Adarsh Davidson MD by Sheryl Scott, ABIMBOLA. 9/8/2022  15:51 EDT'    I, Adarsh Davidson MD, personally performed the services as scribed by the above named individual. I have made any necessary edits and it is both accurate and complete.     Adarsh Davidson MD, MSc, FACC, [facility]  Interventional Cardiology  [facility]

## 2022-09-17 DIAGNOSIS — K21.9 GASTROESOPHAGEAL REFLUX DISEASE WITHOUT ESOPHAGITIS: ICD-10-CM

## 2022-09-19 RX ORDER — RIVAROXABAN 20 MG/1
TABLET, FILM COATED ORAL
Qty: 90 TABLET | Refills: 0 | Status: SHIPPED | OUTPATIENT
Start: 2022-09-19 | End: 2022-12-19

## 2022-09-19 RX ORDER — FAMOTIDINE 20 MG/1
TABLET, FILM COATED ORAL
Qty: 180 TABLET | Refills: 0 | Status: SHIPPED | OUTPATIENT
Start: 2022-09-19 | End: 2022-12-27

## 2022-10-17 RX ORDER — SOTALOL HYDROCHLORIDE 80 MG/1
TABLET ORAL
Qty: 180 TABLET | Refills: 0 | Status: SHIPPED | OUTPATIENT
Start: 2022-10-17 | End: 2023-01-12

## 2022-10-23 DIAGNOSIS — I10 ESSENTIAL HYPERTENSION: ICD-10-CM

## 2022-10-24 RX ORDER — AMLODIPINE BESYLATE 10 MG/1
TABLET ORAL
Qty: 90 TABLET | Refills: 0 | Status: SHIPPED | OUTPATIENT
Start: 2022-10-24 | End: 2023-01-16

## 2022-12-14 DIAGNOSIS — J41.1 MUCOPURULENT CHRONIC BRONCHITIS: ICD-10-CM

## 2022-12-14 RX ORDER — TIOTROPIUM BROMIDE AND OLODATEROL 3.124; 2.736 UG/1; UG/1
SPRAY, METERED RESPIRATORY (INHALATION)
Qty: 12 G | Refills: 0 | Status: SHIPPED | OUTPATIENT
Start: 2022-12-14 | End: 2023-04-06

## 2022-12-19 RX ORDER — RIVAROXABAN 20 MG/1
TABLET, FILM COATED ORAL
Qty: 90 TABLET | Refills: 0 | Status: SHIPPED | OUTPATIENT
Start: 2022-12-19 | End: 2023-03-20

## 2022-12-24 DIAGNOSIS — K21.9 GASTROESOPHAGEAL REFLUX DISEASE WITHOUT ESOPHAGITIS: ICD-10-CM

## 2022-12-27 RX ORDER — FAMOTIDINE 20 MG/1
TABLET, FILM COATED ORAL
Qty: 180 TABLET | Refills: 1 | Status: SHIPPED | OUTPATIENT
Start: 2022-12-27

## 2022-12-27 NOTE — TELEPHONE ENCOUNTER
Rx Refill Note  Requested Prescriptions     Pending Prescriptions Disp Refills   • famotidine (PEPCID) 20 MG tablet [Pharmacy Med Name: Famotidine 20 MG Oral Tablet] 180 tablet 0     Sig: Take 1 tablet by mouth twice daily      Last office visit with prescribing clinician: 6/22/2022   Last telemedicine visit with prescribing clinician: Visit date not found   Next office visit with prescribing clinician: Visit date not found                         Would you like a call back once the refill request has been completed: [] Yes [] No    If the office needs to give you a call back, can they leave a voicemail: [] Yes [] No    Rosa Henry LPN  12/27/22, 15:47 EST

## 2023-01-12 RX ORDER — SOTALOL HYDROCHLORIDE 80 MG/1
TABLET ORAL
Qty: 180 TABLET | Refills: 6 | Status: SHIPPED | OUTPATIENT
Start: 2023-01-12

## 2023-01-14 DIAGNOSIS — I10 ESSENTIAL HYPERTENSION: ICD-10-CM

## 2023-01-16 RX ORDER — AMLODIPINE BESYLATE 10 MG/1
TABLET ORAL
Qty: 90 TABLET | Refills: 0 | Status: SHIPPED | OUTPATIENT
Start: 2023-01-16

## 2023-03-20 RX ORDER — RIVAROXABAN 20 MG/1
TABLET, FILM COATED ORAL
Qty: 90 TABLET | Refills: 0 | Status: SHIPPED | OUTPATIENT
Start: 2023-03-20

## 2023-04-06 DIAGNOSIS — J41.1 MUCOPURULENT CHRONIC BRONCHITIS: ICD-10-CM

## 2023-04-06 RX ORDER — TIOTROPIUM BROMIDE AND OLODATEROL 3.124; 2.736 UG/1; UG/1
SPRAY, METERED RESPIRATORY (INHALATION)
Qty: 12 G | Refills: 0 | Status: SHIPPED | OUTPATIENT
Start: 2023-04-06

## 2023-04-07 ENCOUNTER — DOCUMENTATION (OUTPATIENT)
Dept: PULMONOLOGY | Facility: CLINIC | Age: 63
End: 2023-04-07
Payer: COMMERCIAL

## 2023-04-16 ENCOUNTER — HOSPITAL ENCOUNTER (EMERGENCY)
Facility: HOSPITAL | Age: 63
Discharge: LEFT BEFORE DISCHARGE | End: 2023-04-16
Payer: MEDICARE

## 2023-04-16 VITALS
RESPIRATION RATE: 19 BRPM | WEIGHT: 165 LBS | BODY MASS INDEX: 23.62 KG/M2 | HEIGHT: 70 IN | OXYGEN SATURATION: 98 % | SYSTOLIC BLOOD PRESSURE: 174 MMHG | DIASTOLIC BLOOD PRESSURE: 95 MMHG | TEMPERATURE: 97.7 F | HEART RATE: 86 BPM

## 2023-04-16 PROCEDURE — 99211 OFF/OP EST MAY X REQ PHY/QHP: CPT

## 2023-04-16 PROCEDURE — 93005 ELECTROCARDIOGRAM TRACING: CPT

## 2023-04-17 ENCOUNTER — APPOINTMENT (OUTPATIENT)
Dept: GENERAL RADIOLOGY | Facility: HOSPITAL | Age: 63
End: 2023-04-17
Payer: MEDICARE

## 2023-04-17 ENCOUNTER — HOSPITAL ENCOUNTER (EMERGENCY)
Facility: HOSPITAL | Age: 63
Discharge: HOME | End: 2023-04-17
Attending: EMERGENCY MEDICINE | Admitting: EMERGENCY MEDICINE
Payer: MEDICARE

## 2023-04-17 VITALS
OXYGEN SATURATION: 97 % | RESPIRATION RATE: 16 BRPM | WEIGHT: 165 LBS | BODY MASS INDEX: 23.62 KG/M2 | HEIGHT: 70 IN | SYSTOLIC BLOOD PRESSURE: 123 MMHG | DIASTOLIC BLOOD PRESSURE: 73 MMHG | TEMPERATURE: 98.4 F | HEART RATE: 82 BPM

## 2023-04-17 DIAGNOSIS — R07.9 CHEST PAIN, UNSPECIFIED TYPE: Primary | ICD-10-CM

## 2023-04-17 LAB
ALBUMIN SERPL-MCNC: 3.9 G/DL (ref 3.5–5.2)
ALBUMIN/GLOB SERPL: 1.3 G/DL
ALP SERPL-CCNC: 85 U/L (ref 39–117)
ALT SERPL W P-5'-P-CCNC: 9 U/L (ref 1–33)
ANION GAP SERPL CALCULATED.3IONS-SCNC: 7.9 MMOL/L (ref 5–15)
AST SERPL-CCNC: 12 U/L (ref 1–32)
BASOPHILS # BLD AUTO: 0.03 10*3/MM3 (ref 0–0.2)
BASOPHILS NFR BLD AUTO: 0.2 % (ref 0–1.5)
BILIRUB SERPL-MCNC: 0.8 MG/DL (ref 0–1.2)
BUN SERPL-MCNC: 8 MG/DL (ref 8–23)
BUN/CREAT SERPL: 10.8 (ref 7–25)
CALCIUM SPEC-SCNC: 9 MG/DL (ref 8.6–10.5)
CHLORIDE SERPL-SCNC: 95 MMOL/L (ref 98–107)
CO2 SERPL-SCNC: 30.1 MMOL/L (ref 22–29)
CREAT SERPL-MCNC: 0.74 MG/DL (ref 0.57–1)
DEPRECATED RDW RBC AUTO: 45.1 FL (ref 37–54)
EGFRCR SERPLBLD CKD-EPI 2021: 89.9 ML/MIN/1.73
EOSINOPHIL # BLD AUTO: 0.22 10*3/MM3 (ref 0–0.4)
EOSINOPHIL NFR BLD AUTO: 1.5 % (ref 0.3–6.2)
ERYTHROCYTE [DISTWIDTH] IN BLOOD BY AUTOMATED COUNT: 14 % (ref 12.3–15.4)
GEN 5 2HR TROPONIN T REFLEX: <6 NG/L
GLOBULIN UR ELPH-MCNC: 3 GM/DL
GLUCOSE SERPL-MCNC: 100 MG/DL (ref 65–99)
HCT VFR BLD AUTO: 39.2 % (ref 34–46.6)
HGB BLD-MCNC: 13.3 G/DL (ref 12–15.9)
HOLD SPECIMEN: NORMAL
HOLD SPECIMEN: NORMAL
IMM GRANULOCYTES # BLD AUTO: 0.05 10*3/MM3 (ref 0–0.05)
IMM GRANULOCYTES NFR BLD AUTO: 0.3 % (ref 0–0.5)
LIPASE SERPL-CCNC: 55 U/L (ref 13–60)
LYMPHOCYTES # BLD AUTO: 3.01 10*3/MM3 (ref 0.7–3.1)
LYMPHOCYTES NFR BLD AUTO: 20.8 % (ref 19.6–45.3)
MCH RBC QN AUTO: 29.6 PG (ref 26.6–33)
MCHC RBC AUTO-ENTMCNC: 33.9 G/DL (ref 31.5–35.7)
MCV RBC AUTO: 87.3 FL (ref 79–97)
MONOCYTES # BLD AUTO: 1.85 10*3/MM3 (ref 0.1–0.9)
MONOCYTES NFR BLD AUTO: 12.8 % (ref 5–12)
NEUTROPHILS NFR BLD AUTO: 64.4 % (ref 42.7–76)
NEUTROPHILS NFR BLD AUTO: 9.31 10*3/MM3 (ref 1.7–7)
NRBC BLD AUTO-RTO: 0 /100 WBC (ref 0–0.2)
NT-PROBNP SERPL-MCNC: 113.9 PG/ML (ref 0–900)
PLATELET # BLD AUTO: 312 10*3/MM3 (ref 140–450)
PMV BLD AUTO: 8.6 FL (ref 6–12)
POTASSIUM SERPL-SCNC: 3.5 MMOL/L (ref 3.5–5.2)
PROT SERPL-MCNC: 6.9 G/DL (ref 6–8.5)
RBC # BLD AUTO: 4.49 10*6/MM3 (ref 3.77–5.28)
SODIUM SERPL-SCNC: 133 MMOL/L (ref 136–145)
TROPONIN T DELTA: NORMAL
TROPONIN T SERPL HS-MCNC: <6 NG/L
WBC NRBC COR # BLD: 14.47 10*3/MM3 (ref 3.4–10.8)
WHOLE BLOOD HOLD COAG: NORMAL
WHOLE BLOOD HOLD SPECIMEN: NORMAL

## 2023-04-17 PROCEDURE — 84484 ASSAY OF TROPONIN QUANT: CPT | Performed by: EMERGENCY MEDICINE

## 2023-04-17 PROCEDURE — 71045 X-RAY EXAM CHEST 1 VIEW: CPT

## 2023-04-17 PROCEDURE — 99284 EMERGENCY DEPT VISIT MOD MDM: CPT

## 2023-04-17 PROCEDURE — 85025 COMPLETE CBC W/AUTO DIFF WBC: CPT | Performed by: EMERGENCY MEDICINE

## 2023-04-17 PROCEDURE — 83880 ASSAY OF NATRIURETIC PEPTIDE: CPT | Performed by: EMERGENCY MEDICINE

## 2023-04-17 PROCEDURE — 93005 ELECTROCARDIOGRAM TRACING: CPT | Performed by: EMERGENCY MEDICINE

## 2023-04-17 PROCEDURE — 80053 COMPREHEN METABOLIC PANEL: CPT | Performed by: EMERGENCY MEDICINE

## 2023-04-17 PROCEDURE — 83690 ASSAY OF LIPASE: CPT | Performed by: EMERGENCY MEDICINE

## 2023-04-17 PROCEDURE — 36415 COLL VENOUS BLD VENIPUNCTURE: CPT

## 2023-04-17 RX ORDER — SODIUM CHLORIDE 0.9 % (FLUSH) 0.9 %
10 SYRINGE (ML) INJECTION AS NEEDED
Status: DISCONTINUED | OUTPATIENT
Start: 2023-04-17 | End: 2023-04-17 | Stop reason: HOSPADM

## 2023-04-17 RX ORDER — ALUMINA, MAGNESIA, AND SIMETHICONE 2400; 2400; 240 MG/30ML; MG/30ML; MG/30ML
15 SUSPENSION ORAL ONCE
Status: COMPLETED | OUTPATIENT
Start: 2023-04-17 | End: 2023-04-17

## 2023-04-17 RX ORDER — HYDROCODONE POLISTIREX AND CHLORPHENIRAMINE POLISTIREX 10; 8 MG/5ML; MG/5ML
5 SUSPENSION, EXTENDED RELEASE ORAL ONCE
Status: COMPLETED | OUTPATIENT
Start: 2023-04-17 | End: 2023-04-17

## 2023-04-17 RX ORDER — LIDOCAINE HYDROCHLORIDE 20 MG/ML
15 SOLUTION OROPHARYNGEAL ONCE
Status: COMPLETED | OUTPATIENT
Start: 2023-04-17 | End: 2023-04-17

## 2023-04-17 RX ADMIN — LIDOCAINE HYDROCHLORIDE 15 ML: 20 SOLUTION ORAL; TOPICAL at 00:52

## 2023-04-17 RX ADMIN — HYDROCODONE POLISTIREX AND CHLORPHENIRAMINE POLISTIREX 5 ML: 10; 8 SUSPENSION, EXTENDED RELEASE ORAL at 00:52

## 2023-04-17 RX ADMIN — ALUMINUM HYDROXIDE, MAGNESIUM HYDROXIDE, AND DIMETHICONE 15 ML: 400; 400; 40 SUSPENSION ORAL at 00:52

## 2023-04-17 NOTE — ED PROVIDER NOTES
HPI: Cristela Stratton is a 63 y.o. female who presents to the emergency department complaining of chest pain.  She states that about 10 hours prior to arrival she started having some pain to her lower chest, radiates up into her neck.  She cannot further describe this.  There are no alleviating or aggravating factors.  It is intermittent.  She notes that she came to the ER earlier but there was a long wait so she left.  She denies fevers, chills, shortness of breath, nausea, vomiting.  She does note about 6 weeks of a cough.      REVIEW OF SYSTEMS: All other systems reviewed and are negative     PAST MEDICAL HISTORY:   Past Medical History:   Diagnosis Date   • Atrial fibrillation     CHADS-VASC = 2   • COPD (chronic obstructive pulmonary disease)    • Hypertension         FAMILY HISTORY:   Family History   Problem Relation Age of Onset   • Cancer Mother    • Cancer Maternal Aunt    • Alcohol abuse Maternal Grandfather    • Heart disease Maternal Grandfather    • No Known Problems Brother         SOCIAL HISTORY:   Social History     Socioeconomic History   • Marital status:    Tobacco Use   • Smoking status: Every Day     Packs/day: 1.00     Years: 22.00     Pack years: 22.00     Types: Cigarettes   • Smokeless tobacco: Never   Vaping Use   • Vaping Use: Never used   Substance and Sexual Activity   • Alcohol use: No   • Drug use: No   • Sexual activity: Never        SURGICAL HISTORY:   History reviewed. No pertinent surgical history.     ALLERGIES: Patient has no known allergies.       PHYSICAL EXAM:   VITAL SIGNS:   Vitals:    04/17/23 0235   BP: 123/73   Pulse:    Resp:    Temp:    SpO2: 97%      CONSTITUTIONAL: Awake, well appearing, nontoxic   HENT: Atraumatic, normocephalic, oral mucosa moist, airway patent. Nares patent without drainage. External ears normal.   EYES: Conjunctivae clear, EOMI, PERRL   NECK: Trachea midline, nontender, supple   CARDIOVASCULAR: Normal heart rate, Normal  rhythm.  PULMONARY/CHEST: Normal work of breathing. Clear to auscultation, no rhonchi, wheezes, or rales.  ABDOMINAL: Nondistended, soft, nontender, no rebound or guarding.  NEUROLOGIC: Nonfocal, moves all four extremities, no gross sensory or motor deficits.   EXTREMITIES: Trace edema bilateral lower extremities  SKIN: Warm, Dry, No erythema, No rash       ED COURSE / MEDICAL DECISION MAKING:     Cristela Stratton is a 63 y.o. female who presents to the emergency department for evaluation of chest pain.  Well-developed, well-nourished lady in no distress with exam as above.  Her vital signs are normal.  Her oxygen saturation is normal on room air at 99%.  Her exam is notable for clear lungs and some lower extremity edema.  Will obtain labs, EKG and chest x-ray.  Disposition pending.    Differential diagnosis includes ACS, GERD, anxiety, arrhythmia among other etiologies.    EKG interpreted by me: Sinus rhythm, normal rate, no acute ST/T changes, this is a normal EKG    Work-up is overall reassuring.  Serial enzymes are undetectable.  She is resting comfortably.  I do feel she is safe for discharge home.  Advised close outpatient follow-up and return precautions.  She is comfortable with understanding the plan.    Final diagnoses:   Chest pain, unspecified type        Adarsh Frank MD  04/17/23 0392

## 2023-04-19 ENCOUNTER — OFFICE VISIT (OUTPATIENT)
Dept: INTERNAL MEDICINE | Facility: CLINIC | Age: 63
End: 2023-04-19
Payer: MEDICARE

## 2023-04-19 VITALS
HEART RATE: 84 BPM | RESPIRATION RATE: 18 BRPM | WEIGHT: 172 LBS | OXYGEN SATURATION: 97 % | SYSTOLIC BLOOD PRESSURE: 136 MMHG | BODY MASS INDEX: 24.62 KG/M2 | DIASTOLIC BLOOD PRESSURE: 77 MMHG | TEMPERATURE: 97.5 F | HEIGHT: 70 IN

## 2023-04-19 DIAGNOSIS — K21.9 GASTROESOPHAGEAL REFLUX DISEASE WITHOUT ESOPHAGITIS: ICD-10-CM

## 2023-04-19 DIAGNOSIS — R07.89 OTHER CHEST PAIN: Primary | ICD-10-CM

## 2023-04-19 DIAGNOSIS — Z91.89 EXCESSIVE CONSUMPTION OF SODA POP: ICD-10-CM

## 2023-04-19 DIAGNOSIS — J32.9 BACTERIAL SINUSITIS: ICD-10-CM

## 2023-04-19 DIAGNOSIS — B96.89 BACTERIAL SINUSITIS: ICD-10-CM

## 2023-04-19 PROCEDURE — 1159F MED LIST DOCD IN RCRD: CPT | Performed by: INTERNAL MEDICINE

## 2023-04-19 PROCEDURE — 99214 OFFICE O/P EST MOD 30 MIN: CPT | Performed by: INTERNAL MEDICINE

## 2023-04-19 PROCEDURE — 3078F DIAST BP <80 MM HG: CPT | Performed by: INTERNAL MEDICINE

## 2023-04-19 PROCEDURE — 3075F SYST BP GE 130 - 139MM HG: CPT | Performed by: INTERNAL MEDICINE

## 2023-04-19 PROCEDURE — 1160F RVW MEDS BY RX/DR IN RCRD: CPT | Performed by: INTERNAL MEDICINE

## 2023-04-19 RX ORDER — CEFDINIR 300 MG/1
300 CAPSULE ORAL 2 TIMES DAILY
Qty: 10 CAPSULE | Refills: 0 | Status: SHIPPED | OUTPATIENT
Start: 2023-04-19

## 2023-04-19 RX ORDER — OMEPRAZOLE 40 MG/1
40 CAPSULE, DELAYED RELEASE ORAL DAILY
Qty: 30 CAPSULE | Refills: 2 | Status: SHIPPED | OUTPATIENT
Start: 2023-04-19

## 2023-04-19 NOTE — PROGRESS NOTES
Chief Complaint   Patient presents with   • Chest Pain     ER follow up on 4/17 [facility], has not had any more chest pain since then   • Cough     For the last 2 months, has had steroids and antibiotics but cough will not go away   • Edema     Bilateral feet and legs       Subjective     History of Present Illness   Cristela Stratton is a 63 y.o. female presenting for follow up after ER visit at [facility] for chest pain.  She stated that she presented first but had to leave early due to care of young grandchildren.  She then presented at [facility] with recurrence of symptoms along with high blood pressures.  She was evaluated and ACS was ruled out.    Has follow up with cardiology on Tuesday.      Has noticed increased issues with indigestion in the last couple of months.  This chest pain episode may have been triggered by a cup of Ramen noodles.     Also has been having some increased sinus congestion with bad taste in mouth.  Was treated for bronchitis but has been done with abx and steroids x 2 weeks.      The following portions of the patient's history were reviewed and updated as appropriate: allergies, current medications, past family history, past medical history, past social history, past surgical history and problem list.    Review of Systems   Constitutional: Positive for fatigue. Negative for chills, diaphoresis and fever.   HENT: Positive for congestion, rhinorrhea and sinus pressure. Negative for ear discharge, ear pain, mouth sores and sore throat.    Eyes: Negative for pain, discharge and visual disturbance.   Respiratory: Negative for cough, chest tightness, shortness of breath and wheezing.    Cardiovascular: Negative for chest pain and palpitations.   Gastrointestinal: Negative for abdominal pain, constipation, diarrhea and nausea.   Genitourinary: Negative for dysuria.   Musculoskeletal: Negative for arthralgias and joint swelling.   Skin: Negative for color change and rash.   Allergic/Immunologic: Negative  for environmental allergies.   Neurological: Negative for dizziness and light-headedness.   Hematological: Negative for adenopathy. Does not bruise/bleed easily.   Psychiatric/Behavioral: Negative for confusion and dysphoric mood.       No Known Allergies    Past Medical History:   Diagnosis Date   • Atrial fibrillation     CHADS-VASC = 2   • COPD (chronic obstructive pulmonary disease)    • Hypertension        Social History     Socioeconomic History   • Marital status:    Tobacco Use   • Smoking status: Every Day     Packs/day: 1.00     Years: 22.00     Pack years: 22.00     Types: Cigarettes   • Smokeless tobacco: Never   Vaping Use   • Vaping Use: Never used   Substance and Sexual Activity   • Alcohol use: No   • Drug use: No   • Sexual activity: Never        History reviewed. No pertinent surgical history.    Family History   Problem Relation Age of Onset   • Cancer Mother    • Cancer Maternal Aunt    • Alcohol abuse Maternal Grandfather    • Heart disease Maternal Grandfather    • No Known Problems Brother          Current Outpatient Medications:   •  acetaminophen (TYLENOL) 500 MG tablet, Take 1 tablet by mouth Every 6 (Six) Hours As Needed for Mild Pain., Disp: , Rfl:   •  albuterol sulfate  (90 Base) MCG/ACT inhaler, Inhale 2 puffs Every 4 (Four) Hours As Needed for Wheezing., Disp: 18 g, Rfl: 11  •  amLODIPine (NORVASC) 10 MG tablet, Take 1 tablet by mouth once daily, Disp: 90 tablet, Rfl: 0  •  atorvastatin (LIPITOR) 10 MG tablet, Take 1 tablet by mouth Every Night., Disp: 90 tablet, Rfl: 3  •  hydroCHLOROthiazide (HYDRODIURIL) 12.5 MG tablet, Take 1 tablet by mouth Daily., Disp: 90 tablet, Rfl: 3  •  ipratropium-albuterol (DUO-NEB) 0.5-2.5 mg/3 ml nebulizer, Take 3 mL by nebulization Every 4 (Four) Hours As Needed for Wheezing., Disp: 360 mL, Rfl: 1  •  sotalol (BETAPACE) 80 MG tablet, Take 1 tablet by mouth twice daily, Disp: 180 tablet, Rfl: 6  •  Stiolto Respimat 2.5-2.5 MCG/ACT aerosol  "solution inhaler, INHALE 2 PUFFS BY MOUTH ONCE DAILY, Disp: 12 g, Rfl: 0  •  Xarelto 20 MG tablet, Take 1 tablet by mouth once daily, Disp: 90 tablet, Rfl: 0  •  cefdinir (OMNICEF) 300 MG capsule, Take 1 capsule by mouth 2 (Two) Times a Day., Disp: 10 capsule, Rfl: 0  •  omeprazole (priLOSEC) 40 MG capsule, Take 1 capsule by mouth Daily., Disp: 30 capsule, Rfl: 2    Objective   /77   Pulse 84   Temp 97.5 °F (36.4 °C)   Resp 18   Ht 177.8 cm (70\")   Wt 78 kg (172 lb)   LMP  (LMP Unknown)   SpO2 97%   BMI 24.68 kg/m²     Physical Exam  Vitals and nursing note reviewed.   Constitutional:       Appearance: Normal appearance. She is well-developed.   HENT:      Head: Normocephalic and atraumatic.      Right Ear: Tympanic membrane and ear canal normal.      Left Ear: Tympanic membrane and ear canal normal.   Eyes:      Extraocular Movements: Extraocular movements intact.      Conjunctiva/sclera: Conjunctivae normal.   Cardiovascular:      Rate and Rhythm: Normal rate and regular rhythm.   Pulmonary:      Effort: Pulmonary effort is normal.      Breath sounds: Normal breath sounds.   Musculoskeletal:      Cervical back: Normal range of motion and neck supple.   Skin:     General: Skin is warm and dry.      Findings: No rash.   Neurological:      General: No focal deficit present.      Mental Status: She is alert and oriented to person, place, and time.   Psychiatric:         Mood and Affect: Mood normal.         Behavior: Behavior normal.         Assessment & Plan   Diagnoses and all orders for this visit:    1. Other chest pain (Primary)    2. Gastroesophageal reflux disease without esophagitis  -     omeprazole (priLOSEC) 40 MG capsule; Take 1 capsule by mouth Daily.  Dispense: 30 capsule; Refill: 2    3. Excessive consumption of soda pop    4. Bacterial sinusitis  -     cefdinir (OMNICEF) 300 MG capsule; Take 1 capsule by mouth 2 (Two) Times a Day.  Dispense: 10 capsule; Refill: 0          Discussion " Summary:  Patient is a 63 y.o. female presenting for follow up.    Chest pain  - ACS ruled out in ER.  ER records were reviewed.  Chest x-ray, EKG, and labs were reviewed with the patient today.  - Patient may follow-up with cardiology as scheduled next week.    GERD  - Suspected cause of her acute chest pain.  Change Pepcid to Prilosec 40 mg daily.  Follow-up symptoms in about 4 to 6 weeks.  Should symptoms persist, consider EGD.    Bacterial sinusitis  - Increase drainage and elevated white count concerning.  Start Omnicef.  No signs of wheezing.  No need for steroids at this point.    Excessive soda pop consumption  - Patient is working on reducing soda intake by drinking more water.      Follow up:  Return in about 6 weeks (around 5/31/2023) for Medicare Wellness.

## 2023-04-21 DIAGNOSIS — I10 ESSENTIAL HYPERTENSION: ICD-10-CM

## 2023-04-21 RX ORDER — AMLODIPINE BESYLATE 10 MG/1
TABLET ORAL
Qty: 90 TABLET | Refills: 0 | Status: SHIPPED | OUTPATIENT
Start: 2023-04-21

## 2023-05-02 ENCOUNTER — DOCUMENTATION (OUTPATIENT)
Dept: PULMONOLOGY | Facility: CLINIC | Age: 63
End: 2023-05-02
Payer: MEDICARE

## 2023-06-12 RX ORDER — RIVAROXABAN 20 MG/1
TABLET, FILM COATED ORAL
Qty: 90 TABLET | Refills: 0 | Status: SHIPPED | OUTPATIENT
Start: 2023-06-12

## 2023-06-15 DIAGNOSIS — K21.9 GASTROESOPHAGEAL REFLUX DISEASE WITHOUT ESOPHAGITIS: ICD-10-CM

## 2023-06-15 DIAGNOSIS — I10 ESSENTIAL HYPERTENSION: ICD-10-CM

## 2023-06-16 RX ORDER — FAMOTIDINE 20 MG/1
TABLET, FILM COATED ORAL
Qty: 180 TABLET | Refills: 0 | OUTPATIENT
Start: 2023-06-16

## 2023-06-16 RX ORDER — HYDROCHLOROTHIAZIDE 12.5 MG/1
TABLET ORAL
Qty: 90 TABLET | Refills: 0 | Status: SHIPPED | OUTPATIENT
Start: 2023-06-16

## 2023-06-17 DIAGNOSIS — K21.9 GASTROESOPHAGEAL REFLUX DISEASE WITHOUT ESOPHAGITIS: ICD-10-CM

## 2023-06-19 RX ORDER — FAMOTIDINE 20 MG/1
TABLET, FILM COATED ORAL
Qty: 180 TABLET | Refills: 0 | OUTPATIENT
Start: 2023-06-19

## 2023-09-08 RX ORDER — RIVAROXABAN 20 MG/1
TABLET, FILM COATED ORAL
Qty: 90 TABLET | Refills: 0 | Status: SHIPPED | OUTPATIENT
Start: 2023-09-08

## 2023-09-14 DIAGNOSIS — I10 ESSENTIAL HYPERTENSION: ICD-10-CM

## 2023-09-15 RX ORDER — HYDROCHLOROTHIAZIDE 12.5 MG/1
TABLET ORAL
Qty: 90 TABLET | Refills: 0 | Status: SHIPPED | OUTPATIENT
Start: 2023-09-15

## 2023-09-21 DIAGNOSIS — J41.1 MUCOPURULENT CHRONIC BRONCHITIS: ICD-10-CM

## 2023-09-21 RX ORDER — TIOTROPIUM BROMIDE AND OLODATEROL 3.124; 2.736 UG/1; UG/1
SPRAY, METERED RESPIRATORY (INHALATION)
Qty: 12 G | Refills: 0 | Status: SHIPPED | OUTPATIENT
Start: 2023-09-21

## 2023-10-04 ENCOUNTER — TELEPHONE (OUTPATIENT)
Dept: INTERNAL MEDICINE | Facility: CLINIC | Age: 63
End: 2023-10-04
Payer: MEDICARE

## 2023-10-14 DIAGNOSIS — I10 ESSENTIAL HYPERTENSION: ICD-10-CM

## 2023-10-14 DIAGNOSIS — E78.2 MIXED HYPERLIPIDEMIA: ICD-10-CM

## 2023-10-16 RX ORDER — AMLODIPINE BESYLATE 10 MG/1
TABLET ORAL
Qty: 90 TABLET | Refills: 0 | Status: SHIPPED | OUTPATIENT
Start: 2023-10-16

## 2023-10-16 RX ORDER — ATORVASTATIN CALCIUM 10 MG/1
TABLET, FILM COATED ORAL
Qty: 90 TABLET | Refills: 0 | Status: SHIPPED | OUTPATIENT
Start: 2023-10-16

## 2023-11-08 ENCOUNTER — TELEPHONE (OUTPATIENT)
Dept: INTERNAL MEDICINE | Facility: CLINIC | Age: 63
End: 2023-11-08

## 2023-11-08 NOTE — TELEPHONE ENCOUNTER
Pharmacy changed to [facility] in [place]. Patient has enough medication to get her through until her January appointment with .

## 2023-11-08 NOTE — TELEPHONE ENCOUNTER
Caller: Cristela Stratton    Relationship: Self    Best call back number: [phone]    What was the call regarding: PT JUST WANTS TO CHANGE HER PHARMACY TO [facility] IN [place].    Is it okay if the provider responds through MyChart: NO

## 2023-11-20 ENCOUNTER — OFFICE VISIT (OUTPATIENT)
Dept: CARDIOLOGY | Facility: CLINIC | Age: 63
End: 2023-11-20
Payer: MEDICARE

## 2023-11-20 VITALS
SYSTOLIC BLOOD PRESSURE: 122 MMHG | HEIGHT: 70 IN | HEART RATE: 71 BPM | OXYGEN SATURATION: 98 % | BODY MASS INDEX: 23.77 KG/M2 | WEIGHT: 166 LBS | DIASTOLIC BLOOD PRESSURE: 74 MMHG

## 2023-11-20 DIAGNOSIS — I34.0 NON-RHEUMATIC MITRAL REGURGITATION: ICD-10-CM

## 2023-11-20 DIAGNOSIS — I48.0 PAROXYSMAL ATRIAL FIBRILLATION: Primary | ICD-10-CM

## 2023-11-20 DIAGNOSIS — I10 ESSENTIAL HYPERTENSION: ICD-10-CM

## 2023-11-20 PROCEDURE — 93000 ELECTROCARDIOGRAM COMPLETE: CPT | Performed by: HOSPITALIST

## 2023-11-20 PROCEDURE — 3074F SYST BP LT 130 MM HG: CPT | Performed by: HOSPITALIST

## 2023-11-20 PROCEDURE — 1159F MED LIST DOCD IN RCRD: CPT | Performed by: HOSPITALIST

## 2023-11-20 PROCEDURE — 99213 OFFICE O/P EST LOW 20 MIN: CPT | Performed by: HOSPITALIST

## 2023-11-20 PROCEDURE — 1160F RVW MEDS BY RX/DR IN RCRD: CPT | Performed by: HOSPITALIST

## 2023-11-20 PROCEDURE — 3078F DIAST BP <80 MM HG: CPT | Performed by: HOSPITALIST

## 2023-12-04 RX ORDER — RIVAROXABAN 20 MG/1
TABLET, FILM COATED ORAL
Qty: 90 TABLET | Refills: 0 | Status: SHIPPED | OUTPATIENT
Start: 2023-12-04

## 2023-12-10 DIAGNOSIS — I10 ESSENTIAL HYPERTENSION: ICD-10-CM

## 2023-12-11 RX ORDER — HYDROCHLOROTHIAZIDE 12.5 MG/1
TABLET ORAL
Qty: 90 TABLET | Refills: 0 | Status: SHIPPED | OUTPATIENT
Start: 2023-12-11

## 2024-01-10 DIAGNOSIS — I10 ESSENTIAL HYPERTENSION: ICD-10-CM

## 2024-01-10 RX ORDER — SOTALOL HYDROCHLORIDE 80 MG/1
80 TABLET ORAL 2 TIMES DAILY
Qty: 90 TABLET | Refills: 0 | Status: SHIPPED | OUTPATIENT
Start: 2024-01-10

## 2024-01-10 RX ORDER — AMLODIPINE BESYLATE 10 MG/1
10 TABLET ORAL DAILY
Qty: 90 TABLET | Refills: 0 | Status: SHIPPED | OUTPATIENT
Start: 2024-01-10

## 2024-01-10 NOTE — TELEPHONE ENCOUNTER
Caller: Viral Cristela BATRES    Relationship: Self    Best call back number: [phone]     Requested Prescriptions:   Requested Prescriptions     Pending Prescriptions Disp Refills    sotalol (BETAPACE) 80 MG tablet 180 tablet 6     Sig: Take 1 tablet by mouth 2 (Two) Times a Day.    amLODIPine (NORVASC) 10 MG tablet 90 tablet 0     Sig: Take 1 tablet by mouth Daily.        Pharmacy where request should be sent: [facility] [id number] [address] AT [address] - [phone] [facility] [phone] FX     Last office visit with prescribing clinician: 9/8/2022   Last telemedicine visit with prescribing clinician: Visit date not found   Next office visit with prescribing clinician: Visit date not found     Additional details provided by patient: PATIENT IS COMPLETELY OUT OF SOTALOL. SHE IS UNABLE TO KEEP HER YEARLY FOLLOW UP DUE TO INSURANCE REDIRECTION. THE PATIENT IS WORKING ON FINDING A NEW PROVIDER, BUT NEEDS A REFILL TO HOLD THEM OVER UNTIL THEY ARE ABLE TO. THEY HAVE SPOKEN WITH [employer] FOR MULTIPLE HOURS TRYING TO GET THIS SITUATED.    Does the patient have less than a 3 day supply:  [x] Yes  [] No    Would you like a call back once the refill request has been completed: [x] Yes [] No    If the office needs to give you a call back, can they leave a voicemail: [] Yes [x] No    John Aadmson Rep   01/10/24 11:26 EST

## 2024-01-16 DIAGNOSIS — E78.2 MIXED HYPERLIPIDEMIA: ICD-10-CM

## 2024-01-16 RX ORDER — ATORVASTATIN CALCIUM 10 MG/1
10 TABLET, FILM COATED ORAL NIGHTLY
Qty: 90 TABLET | Refills: 0 | Status: SHIPPED | OUTPATIENT
Start: 2024-01-16

## 2024-01-16 NOTE — TELEPHONE ENCOUNTER
Caller: Cristela Stratton    Relationship: Self    Best call back number: [phone]     Requested Prescriptions:   Requested Prescriptions     Pending Prescriptions Disp Refills    atorvastatin (LIPITOR) 10 MG tablet 90 tablet 0        Pharmacy where request should be sent: [facility] [id number] [facility] [address] [facility] [address] AT [place] [address] - [phone]  - [phone] FX     Last office visit with prescribing clinician: 4/19/2023   Last telemedicine visit with prescribing clinician: Visit date not found   Next office visit with prescribing clinician: Visit date not found     Additional details provided by patient: PATIENT WILL BE OUT 1/17 AND NEEDS A REFILL TO LAST UNTIL SHE CAN ESTABLISH CARE WITH PROVIDER 3/26    Does the patient have less than a 3 day supply:  [x] Yes  [] No    John Crooks Rep   01/16/24 11:11 EST

## 2024-02-20 RX ORDER — SOTALOL HYDROCHLORIDE 80 MG/1
80 TABLET ORAL 2 TIMES DAILY
Qty: 90 TABLET | Refills: 3 | Status: SHIPPED | OUTPATIENT
Start: 2024-02-20

## 2024-03-10 DIAGNOSIS — I10 ESSENTIAL HYPERTENSION: ICD-10-CM

## 2024-03-11 RX ORDER — RIVAROXABAN 20 MG/1
20 TABLET, FILM COATED ORAL DAILY
Qty: 90 TABLET | Refills: 0 | Status: SHIPPED | OUTPATIENT
Start: 2024-03-11

## 2024-03-12 RX ORDER — HYDROCHLOROTHIAZIDE 12.5 MG/1
12.5 TABLET ORAL DAILY
Qty: 30 TABLET | Refills: 0 | Status: SHIPPED | OUTPATIENT
Start: 2024-03-12